# Patient Record
Sex: FEMALE | Race: WHITE | NOT HISPANIC OR LATINO | Employment: FULL TIME | URBAN - METROPOLITAN AREA
[De-identification: names, ages, dates, MRNs, and addresses within clinical notes are randomized per-mention and may not be internally consistent; named-entity substitution may affect disease eponyms.]

---

## 2017-01-04 ENCOUNTER — OFFICE VISIT (OUTPATIENT)
Dept: FAMILY MEDICINE CLINIC | Facility: CLINIC | Age: 32
End: 2017-01-04

## 2017-01-04 VITALS
DIASTOLIC BLOOD PRESSURE: 72 MMHG | OXYGEN SATURATION: 95 % | BODY MASS INDEX: 32.43 KG/M2 | HEART RATE: 71 BPM | SYSTOLIC BLOOD PRESSURE: 110 MMHG | HEIGHT: 63 IN | TEMPERATURE: 97.6 F | WEIGHT: 183 LBS

## 2017-01-04 DIAGNOSIS — Z87.42: ICD-10-CM

## 2017-01-04 DIAGNOSIS — N94.819 VULVODYNIA: ICD-10-CM

## 2017-01-04 DIAGNOSIS — N39.45 CONTINUOUS LEAKAGE OF URINE: Primary | ICD-10-CM

## 2017-01-04 LAB
BILIRUB BLD-MCNC: NEGATIVE MG/DL
CLARITY, POC: CLEAR
COLOR UR: YELLOW
GLUCOSE UR STRIP-MCNC: NEGATIVE MG/DL
KETONES UR QL: NEGATIVE
LEUKOCYTE EST, POC: NEGATIVE
NITRITE UR-MCNC: NEGATIVE MG/ML
PH UR: 5 [PH] (ref 5–8)
PROT UR STRIP-MCNC: NEGATIVE MG/DL
RBC # UR STRIP: NEGATIVE /UL
SP GR UR: 1.03 (ref 1–1.03)
UROBILINOGEN UR QL: NORMAL

## 2017-01-04 PROCEDURE — 99213 OFFICE O/P EST LOW 20 MIN: CPT | Performed by: PHYSICIAN ASSISTANT

## 2017-01-04 PROCEDURE — 81003 URINALYSIS AUTO W/O SCOPE: CPT | Performed by: PHYSICIAN ASSISTANT

## 2017-01-04 NOTE — MR AVS SNAPSHOT
Adry DYER Prophcortney   1/4/2017 4:00 PM   Office Visit    Provider:  Cora Palmer PA-C   Department:  [facility]   Dept Phone:  [phone]                Your Full Care Plan              Today's Medication Changes          These changes are accurate as of: 1/4/17  4:41 PM.  If you have any questions, ask your nurse or doctor.               Stop taking medication(s)listed here:     cefdinir 300 MG capsule   Commonly known as:  OMNICEF   Stopped by:  Cora Palmer PA-C           metroNIDAZOLE 500 MG tablet   Commonly known as:  FLAGYL   Stopped by:  Cora Palmer PA-C           oseltamivir 75 MG capsule   Commonly known as:  TAMIFLU   Stopped by:  Cora Palmer PA-C                      Your Updated Medication List          This list is accurate as of: 1/4/17  4:41 PM.  Always use your most recent med list.                buPROPion  MG 24 hr tablet   Commonly known as:  WELLBUTRIN XL   TAKE 1 TABLET BY MOUTH DAILY FOR STRESS.       FLUoxetine 20 MG capsule   Commonly known as:  PROzac   TAKE 1 CAPSULE BY MOUTH DAILY       linaclotide 290 MCG capsule capsule   Commonly known as:  LINZESS   Take 290,000 mcg by mouth Daily. For constipation       ondansetron 4 MG tablet   Commonly known as:  ZOFRAN       pantoprazole 40 MG EC tablet   Commonly known as:  PROTONIX       RELPAX 40 MG tablet   Generic drug:  eletriptan       rizatriptan 10 MG tablet   Commonly known as:  MAXALT   Take 1 tablet by mouth 1 (one) time as needed for migraine for up to 1 dose. May repeat in 2 hours if needed       tiZANidine 4 MG tablet   Commonly known as:  ZANAFLEX   TAKE 1/2 TO 1 TABLET BY MOUTH EVERY 8 HOURS AS NEEDED FOR SPASMS       topiramate 100 MG tablet   Commonly known as:  TOPAMAX   Take 1 tablet by mouth 2 (two) times a day. For migraine suppression               We Performed the Following     Ambulatory Referral to Obstetrics / Gynecology     POC Urinalysis Dipstick,  "Automated     Urinalysis With Microscopic     Urine Culture       You Were Diagnosed With        Codes Comments    Continuous leakage of urine    -  Primary ICD-10-CM: N39.45  ICD-9-CM: 788.37     H/O vaginal discharge     ICD-10-CM: Z87.42  ICD-9-CM: V13.29     Vulvodynia     ICD-10-CM: N94.819  ICD-9-CM: 625.70       Instructions    See GYN  Send urine for work up     Patient Instructions History      MyChart Signup     Our records indicate that you have an active [employer] account.    You can view your After Visit Summary by going to [employer] and logging in with your [employer] username and password.  If you don't have a [employer] username and password but a parent or guardian has access to your record, the parent or guardian should login with their own [employer] username and password and access your record to view the After Visit Summary.    If you have questions, you can email [email or web address] or call [phone] to talk to our [employer] staff.  Remember, [employer] is NOT to be used for urgent needs.  For medical emergencies, dial 911.               Other Info from Your Visit           Allergies     No Known Allergies      Reason for Visit     Illness           Vital Signs     Blood Pressure Pulse Temperature Height Weight Last Menstrual Period    110/72 (BP Location: Left arm, Patient Position: Sitting, Cuff Size: Adult) 71 97.6 °F (36.4 °C) (Oral) 63\" (160 cm) 183 lb (83 kg) 12/28/2016    Oxygen Saturation Body Mass Index Smoking Status             95% 32.42 kg/m2 Never Smoker         Problems and Diagnoses Noted     Continuous leakage of urine    -  Primary    H/O vaginal discharge        Pain in vulva          Results     POC Urinalysis Dipstick, Automated      Component Value Standard Range & Units    Color Yellow Yellow, Straw, Dark Yellow, [place]    Clarity, UA Clear Clear    Glucose, UA Negative Negative, 1000 mg/dL (3+) mg/dL    Bilirubin Negative Negative    Ketones, UA " Negative Negative    Specific Gravity  1.030 1.005 - 1.030    Blood, UA Negative Negative    pH, Urine 5.0 5.0 - 8.0    Protein, POC Negative Negative mg/dL    Urobilinogen, UA Normal Normal    Leukocytes Negative Negative    Nitrite, UA Negative Negative

## 2017-01-04 NOTE — PROGRESS NOTES
Subjective   Adry Knight is a 31 y.o. female.     History of Present Illness      Adry Knight 31 y.o. female who presents today for vaginal d/c and leaking urine  she has a history of   Patient Active Problem List   Diagnosis   • Chronic migraine without aura, with intractable migraine, so stated, without mention of status migrainosus   • Headache, migraine   • Burning or prickling sensation   • Anxiety   • Depression   • Low back pain without sciatica   • Neck pain   • Pain of left scapula   .      Still having mucus vaginal d/c--less odor since Rx Flagyl;  Leaking urine ---2 weeks  Has IUD  Sex more painful   No new abd or pelvic pain  No fever  No risk STD  Usual stress incontinence    I did wet prep and only occas WBC/HPF    The following portions of the patient's history were reviewed and updated as appropriate: allergies, current medications, past family history, past medical history, past social history, past surgical history and problem list.    Review of Systems   Constitutional: Negative for activity change, appetite change and unexpected weight change.   HENT: Negative for nosebleeds and trouble swallowing.    Eyes: Negative for pain and visual disturbance.   Respiratory: Negative for chest tightness, shortness of breath and wheezing.    Cardiovascular: Negative for chest pain and palpitations.   Gastrointestinal: Negative for abdominal pain and blood in stool.   Endocrine: Negative.    Genitourinary: Positive for dyspareunia and vaginal discharge. Negative for difficulty urinating and hematuria.   Musculoskeletal: Negative for joint swelling.   Skin: Negative for color change and rash.   Allergic/Immunologic: Negative.    Neurological: Positive for headaches. Negative for syncope and speech difficulty.   Hematological: Negative for adenopathy.   Psychiatric/Behavioral: Negative for agitation and confusion.   All other systems reviewed and are negative.      Objective   Physical Exam    Constitutional: She is oriented to person, place, and time. She appears well-developed and well-nourished. No distress.   HENT:   Head: Normocephalic and atraumatic.   Eyes: Conjunctivae and EOM are normal.   Neck: Neck supple.   Cardiovascular: Normal rate, regular rhythm, normal heart sounds and intact distal pulses.    Pulmonary/Chest: Effort normal and breath sounds normal.   Abdominal: Soft. Bowel sounds are normal. She exhibits no distension and no mass. There is no tenderness. There is no rebound and no guarding.   Genitourinary: Vagina normal and uterus normal.   Genitourinary Comments: Vaginal external is WNL; no lesions, no masses, no erythema  I see and palp Mirena string  Cervix is sore to palp  Some bladder prolapse  Mild uterine prolapse  Normal white d/c in canal  No odor     Musculoskeletal: Normal range of motion.   Neurological: She is alert and oriented to person, place, and time. She has normal reflexes.   Skin: Skin is warm and dry. No rash noted. She is not diaphoretic.   Psychiatric: She has a normal mood and affect. Her behavior is normal. Judgment and thought content normal.   Nursing note and vitals reviewed.      Assessment/Plan   Problems Addressed this Visit     None      Visit Diagnoses     Continuous leakage of urine    -  Primary    Relevant Orders    POC Urinalysis Dipstick, Automated (Completed)    Urinalysis With Microscopic    Urine Culture    Ambulatory Referral to Obstetrics / Gynecology    H/O vaginal discharge        Relevant Orders    Ambulatory Referral to Obstetrics / Gynecology    Vulvodynia

## 2017-01-06 LAB
APPEARANCE UR: CLEAR
BACTERIA #/AREA URNS HPF: ABNORMAL /[HPF]
BACTERIA UR CULT: NORMAL
BACTERIA UR CULT: NORMAL
BILIRUB UR QL STRIP: NEGATIVE
CASTS URNS MICRO: ABNORMAL
CASTS URNS QL MICRO: PRESENT /LPF
COLOR UR: YELLOW
EPI CELLS #/AREA URNS HPF: ABNORMAL /HPF
GLUCOSE UR QL: NEGATIVE
HGB UR QL STRIP: NEGATIVE
KETONES UR QL STRIP: NEGATIVE
LEUKOCYTE ESTERASE UR QL STRIP: NEGATIVE
MICRO URNS: (no result)
MICRO URNS: (no result)
MUCOUS THREADS URNS QL MICRO: PRESENT
NITRITE UR QL STRIP: NEGATIVE
PH UR STRIP: 5.5 [PH] (ref 5–7.5)
PROT UR QL STRIP: NEGATIVE
RBC #/AREA URNS HPF: ABNORMAL /HPF
SP GR UR: 1.02 (ref 1–1.03)
UROBILINOGEN UR STRIP-MCNC: 0.2 MG/DL (ref 0.2–1)
WBC #/AREA URNS HPF: ABNORMAL /HPF

## 2017-02-13 ENCOUNTER — HOSPITAL ENCOUNTER (OUTPATIENT)
Dept: CT IMAGING | Facility: HOSPITAL | Age: 32
Discharge: HOME | End: 2017-02-13
Admitting: PHYSICIAN ASSISTANT

## 2017-02-13 DIAGNOSIS — R10.9 ACUTE FLANK PAIN: ICD-10-CM

## 2017-02-13 DIAGNOSIS — R31.9 HEMATURIA: Primary | ICD-10-CM

## 2017-02-13 DIAGNOSIS — R31.9 HEMATURIA: ICD-10-CM

## 2017-02-13 PROCEDURE — 74176 CT ABD & PELVIS W/O CONTRAST: CPT

## 2017-02-14 DIAGNOSIS — K59.04 CHRONIC IDIOPATHIC CONSTIPATION: Primary | ICD-10-CM

## 2017-02-20 ENCOUNTER — OFFICE VISIT (OUTPATIENT)
Dept: GASTROENTEROLOGY | Facility: CLINIC | Age: 32
End: 2017-02-20

## 2017-02-20 VITALS
BODY MASS INDEX: 32.82 KG/M2 | WEIGHT: 185.2 LBS | DIASTOLIC BLOOD PRESSURE: 74 MMHG | HEIGHT: 63 IN | SYSTOLIC BLOOD PRESSURE: 112 MMHG

## 2017-02-20 DIAGNOSIS — R07.89 OTHER CHEST PAIN: ICD-10-CM

## 2017-02-20 DIAGNOSIS — R11.0 NAUSEA: Primary | ICD-10-CM

## 2017-02-20 DIAGNOSIS — K59.00 CONSTIPATION, UNSPECIFIED CONSTIPATION TYPE: ICD-10-CM

## 2017-02-20 DIAGNOSIS — R09.89 GLOBUS SENSATION: ICD-10-CM

## 2017-02-20 DIAGNOSIS — R19.4 BOWEL HABIT CHANGES: ICD-10-CM

## 2017-02-20 DIAGNOSIS — R10.13 EPIGASTRIC PAIN: ICD-10-CM

## 2017-02-20 DIAGNOSIS — R53.81 MALAISE AND FATIGUE: ICD-10-CM

## 2017-02-20 DIAGNOSIS — R53.83 MALAISE AND FATIGUE: ICD-10-CM

## 2017-02-20 LAB
ALBUMIN SERPL-MCNC: 4.6 G/DL (ref 3.5–5.2)
ALBUMIN/GLOB SERPL: 2.1 G/DL
ALP SERPL-CCNC: 59 U/L (ref 39–117)
ALT SERPL-CCNC: 34 U/L (ref 1–33)
AMYLASE SERPL-CCNC: 75 U/L (ref 28–100)
AST SERPL-CCNC: 20 U/L (ref 1–32)
BASOPHILS # BLD AUTO: 0.06 10*3/MM3 (ref 0–0.2)
BASOPHILS NFR BLD AUTO: 0.9 % (ref 0–1.5)
BILIRUB SERPL-MCNC: 0.3 MG/DL (ref 0.1–1.2)
BUN SERPL-MCNC: 18 MG/DL (ref 6–20)
BUN/CREAT SERPL: 22.2 (ref 7–25)
CALCIUM SERPL-MCNC: 9.2 MG/DL (ref 8.6–10.5)
CHLORIDE SERPL-SCNC: 101 MMOL/L (ref 98–107)
CO2 SERPL-SCNC: 27.2 MMOL/L (ref 22–29)
CREAT SERPL-MCNC: 0.81 MG/DL (ref 0.57–1)
EOSINOPHIL # BLD AUTO: 0.1 10*3/MM3 (ref 0–0.7)
EOSINOPHIL NFR BLD AUTO: 1.5 % (ref 0.3–6.2)
ERYTHROCYTE [DISTWIDTH] IN BLOOD BY AUTOMATED COUNT: 12.9 % (ref 11.7–13)
GLOBULIN SER CALC-MCNC: 2.2 GM/DL
GLUCOSE SERPL-MCNC: 80 MG/DL (ref 65–99)
HCT VFR BLD AUTO: 44.1 % (ref 35.6–45.5)
HGB BLD-MCNC: 14.4 G/DL (ref 11.9–15.5)
IMM GRANULOCYTES # BLD: 0.02 10*3/MM3 (ref 0–0.03)
IMM GRANULOCYTES NFR BLD: 0.3 % (ref 0–0.5)
LIPASE SERPL-CCNC: 29 U/L (ref 13–60)
LYMPHOCYTES # BLD AUTO: 2.17 10*3/MM3 (ref 0.9–4.8)
LYMPHOCYTES NFR BLD AUTO: 33.2 % (ref 19.6–45.3)
MCH RBC QN AUTO: 30.3 PG (ref 26.9–32)
MCHC RBC AUTO-ENTMCNC: 32.7 G/DL (ref 32.4–36.3)
MCV RBC AUTO: 92.8 FL (ref 80.5–98.2)
MONOCYTES # BLD AUTO: 0.39 10*3/MM3 (ref 0.2–1.2)
MONOCYTES NFR BLD AUTO: 6 % (ref 5–12)
NEUTROPHILS # BLD AUTO: 3.79 10*3/MM3 (ref 1.9–8.1)
NEUTROPHILS NFR BLD AUTO: 58.1 % (ref 42.7–76)
PLATELET # BLD AUTO: 239 10*3/MM3 (ref 140–500)
POTASSIUM SERPL-SCNC: 4.4 MMOL/L (ref 3.5–5.2)
PROT SERPL-MCNC: 6.8 G/DL (ref 6–8.5)
RBC # BLD AUTO: 4.75 10*6/MM3 (ref 3.9–5.2)
SODIUM SERPL-SCNC: 140 MMOL/L (ref 136–145)
TSH SERPL DL<=0.005 MIU/L-ACNC: 1.37 MIU/ML (ref 0.27–4.2)
WBC # BLD AUTO: 6.53 10*3/MM3 (ref 4.5–10.7)

## 2017-02-20 PROCEDURE — 99214 OFFICE O/P EST MOD 30 MIN: CPT | Performed by: NURSE PRACTITIONER

## 2017-02-20 RX ORDER — IBUPROFEN 200 MG
200 TABLET ORAL EVERY 6 HOURS PRN
COMMUNITY
End: 2019-06-28

## 2017-02-20 RX ORDER — PANTOPRAZOLE SODIUM 40 MG/1
40 TABLET, DELAYED RELEASE ORAL DAILY
Qty: 30 TABLET | Refills: 5 | Status: SHIPPED | OUTPATIENT
Start: 2017-02-20 | End: 2018-07-10 | Stop reason: SDUPTHER

## 2017-02-20 NOTE — PROGRESS NOTES
Sravan Knight is a 31 y.o. female, patient of Dr. Ahmadi.  Here today for recurrent abdominal pain and constipation.  New onset of nausea associated with constipation.  In 9/2015, Linzess 290 MCG's was started.  Initially, constipation was relieved.  Several weeks ago, she had no BM for 2 weeks.  Her PCP instructed her to take 225 g of MiraLAX mixed with Gatorade. BM did occur but she felt like it was not a complete evacuation.  She stopped Linzess 1 week ago and now takes MiraLAX every day.  Continues to experience constipation.  One incident of blood on toilet paper with straining.  Also complaining of globus sensation, and early satiety.  Denies dysphagia, odynophagia, vomiting and bloody/black stools.  Was seen by PCP earlier this month due to flank pain.  CT abdomen/pelvis on 2/13/17 was negative.  Has never had a colonoscopy.  Last EGD, 9/2015, was normal.  No history of colon cancer in family.    Abdominal Pain   This is a recurrent problem. The current episode started 1 to 4 weeks ago. The onset quality is undetermined. The problem occurs constantly. The most recent episode lasted 2 weeks. The problem has been waxing and waning. The pain is located in the generalized abdominal region. The pain is at a severity of 6/10. The quality of the pain is cramping. The abdominal pain radiates to the back. Associated symptoms include constipation, frequency, headaches (history of migraine headaches), hematuria, myalgias and nausea. Pertinent negatives include no anorexia, arthralgias, diarrhea, fever or vomiting. The pain is aggravated by certain positions and eating. The pain is relieved by nothing. Treatments tried: linzess. The treatment provided no relief. Prior diagnostic workup includes CT scan. Her past medical history is significant for irritable bowel syndrome. There is no history of abdominal surgery, Crohn's disease, PUD or ulcerative colitis.   Constipation   This is a recurrent  problem. The current episode started more than 1 year ago. The problem has been gradually improving since onset. The stool is described as formed and pencil thin. The patient is not on a high fiber diet. She does not exercise regularly. There has not been adequate water intake. Associated symptoms include abdominal pain and nausea. Pertinent negatives include no anorexia, diarrhea, fever, rectal pain or vomiting. She has tried stool softeners (also uses Miralax) for the symptoms. The treatment provided mild relief. Her past medical history is significant for irritable bowel syndrome. There is no history of abdominal surgery or inflammatory bowel disease.   Nausea   This is a new problem. The current episode started 1 to 4 weeks ago. Associated symptoms include abdominal pain, a change in bowel habit, chest pain, fatigue, headaches (history of migraine headaches), myalgias, nausea and a sore throat. Pertinent negatives include no anorexia, arthralgias, chills, coughing, fever, rash, swollen glands, vomiting or weakness. Nothing aggravates the symptoms. She has tried nothing for the symptoms.        Vitals:    02/20/17 1019   BP: 112/74         Current Outpatient Prescriptions:   •  buPROPion XL (WELLBUTRIN XL) 300 MG 24 hr tablet, TAKE 1 TABLET BY MOUTH DAILY FOR STRESS., Disp: 30 tablet, Rfl: 5  •  ibuprofen (ADVIL,MOTRIN) 200 MG tablet, Take 200 mg by mouth Every 6 (Six) Hours As Needed for mild pain (1-3)., Disp: , Rfl:   •  rizatriptan (MAXALT) 10 MG tablet, Take 1 tablet by mouth 1 (one) time as needed for migraine for up to 1 dose. May repeat in 2 hours if needed, Disp: 12 tablet, Rfl: 5  •  tiZANidine (ZANAFLEX) 4 MG tablet, TAKE 1/2 TO 1 TABLET BY MOUTH EVERY 8 HOURS AS NEEDED FOR SPASMS, Disp: 90 tablet, Rfl: 0  •  FLUoxetine (PROzac) 20 MG capsule, TAKE 1 CAPSULE BY MOUTH DAILY, Disp: 30 capsule, Rfl: 0  •  pantoprazole (PROTONIX) 40 MG EC tablet, Take 1 tablet by mouth Daily., Disp: 30 tablet, Rfl: 5    No  Known Allergies    Past Medical History   Diagnosis Date   • Anxiety    • Depression    • Dysphagia    • Headache    • Hernia    • Irritable bowel syndrome    • Lactose intolerance    • LFT elevation        Past Surgical History   Procedure Laterality Date   • Tonsillectomy     • Endoscopy  09/21/2015     Normal    • Upper gastrointestinal endoscopy  09/21/2015     normal       Family History   Problem Relation Age of Onset   • Depression Mother    • Irritable bowel syndrome Mother    • Cancer Sister    • Cancer Paternal Grandmother    • Celiac disease Sister    • Irritable bowel syndrome Maternal Grandmother        Social History     Social History   • Marital status:      Spouse name: N/A   • Number of children: N/A   • Years of education: N/A     Social History Main Topics   • Smoking status: Never Smoker   • Smokeless tobacco: None   • Alcohol use No   • Drug use: No   • Sexual activity: Yes     Partners: Male     Birth control/ protection: IUD     Other Topics Concern   • None     Social History Narrative       The following portions of the patient's history were reviewed and updated as appropriate: allergies, current medications, past family history, past medical history, past social history, past surgical history and problem list.    Review of Systems   Constitutional: Positive for appetite change and fatigue. Negative for activity change, chills and fever.   HENT: Positive for sore throat. Negative for trouble swallowing.    Respiratory: Positive for shortness of breath (states chest pain is associated with shortness of breath, denies diaphoresis.). Negative for cough.    Cardiovascular: Positive for chest pain. Negative for leg swelling.   Gastrointestinal: Positive for abdominal distention, abdominal pain, change in bowel habit, constipation and nausea. Negative for anal bleeding, anorexia, blood in stool, diarrhea, rectal pain and vomiting.   Genitourinary: Positive for frequency and hematuria.      "   Earlier this month was seen by GYN for urinary incontinence.  Medication was given but patient is not taking it due to side effect of constipation.   Musculoskeletal: Positive for myalgias. Negative for arthralgias.   Skin: Negative for color change, pallor and rash.   Neurological: Positive for headaches (history of migraine headaches). Negative for dizziness, syncope, weakness and light-headedness.       Objective     Physical Exam   Constitutional: She is oriented to person, place, and time. She appears well-developed and well-nourished. No distress.   HENT:   Head: Normocephalic and atraumatic.   Complaining of \"bump\" in right oral cavity.  Small soft, pink nodule palpated on right side of the oral cavity, tender to palpation.   Eyes: Conjunctivae are normal. No scleral icterus.   Neck: Normal range of motion. Neck supple.   Cardiovascular: Normal rate, regular rhythm, normal heart sounds and intact distal pulses.    Pulmonary/Chest: Effort normal and breath sounds normal. No respiratory distress.   Abdominal: Soft. Bowel sounds are normal. She exhibits no distension and no mass. There is tenderness ( pain in epigastric area with palpation). There is no rebound and no guarding.   Musculoskeletal: She exhibits no deformity.   Lymphadenopathy:     She has no cervical adenopathy.   Neurological: She is alert and oriented to person, place, and time.   Skin: Skin is warm and dry. No rash noted. No pallor.   Psychiatric: She has a normal mood and affect. Her behavior is normal. Judgment and thought content normal.   Nursing note and vitals reviewed.      Assessment/Plan     Adry was seen today for abdominal pain, constipation and nausea.    Diagnoses and all orders for this visit:    Nausea  -     CBC & Differential  -     Comprehensive Metabolic Panel  -     Lipase  -     Amylase  -     TSH  -     Case request    Globus sensation  -     CBC & Differential  -     Comprehensive Metabolic Panel  -     Lipase  -    "  Amylase  -     TSH  -     Case request    Bowel habit changes  -     CBC & Differential  -     Comprehensive Metabolic Panel  -     Lipase  -     Amylase  -     TSH  -     Case request    Constipation, unspecified constipation type  -     CBC & Differential  -     Comprehensive Metabolic Panel  -     Lipase  -     Amylase  -     TSH  -     Case request    Epigastric pain  -     CBC & Differential  -     Comprehensive Metabolic Panel  -     Lipase  -     Amylase  -     TSH  -     Case request    Malaise and fatigue  -     CBC & Differential  -     Comprehensive Metabolic Panel  -     Lipase  -     Amylase  -     TSH  -     Case request    Other chest pain    Other orders  -     pantoprazole (PROTONIX) 40 MG EC tablet; Take 1 tablet by mouth Daily.      Assessment/plan  1.  Nausea/globus sensation/epigastric pain - EGD with Dr. Alas.  Restart Protonix 40 mg daily.  Check above labs and call patient with results.  2.  Bowel habit changes/constipation - continue with MiraLAX.  Add stool softener.  Increase daily water intake and dietary fiber.  Schedule colonoscopy with Dr. Alas.    3.  Fatigue - check TSH and call patient with results.   4.  Chest pain  - advised patient to discuss with PCP as soon as possible.      Discussed plan of care with patient. Verbalizes understanding and agrees with plan.  Advised to call office for any concerns or questions regarding today's visit and if any GI problems should arise.

## 2017-02-22 ENCOUNTER — OFFICE VISIT (OUTPATIENT)
Dept: FAMILY MEDICINE CLINIC | Facility: CLINIC | Age: 32
End: 2017-02-22

## 2017-02-22 VITALS
TEMPERATURE: 98.2 F | BODY MASS INDEX: 32.78 KG/M2 | HEART RATE: 66 BPM | HEIGHT: 63 IN | SYSTOLIC BLOOD PRESSURE: 110 MMHG | WEIGHT: 185 LBS | RESPIRATION RATE: 16 BRPM | DIASTOLIC BLOOD PRESSURE: 72 MMHG | OXYGEN SATURATION: 96 %

## 2017-02-22 DIAGNOSIS — G43.709 CHRONIC MIGRAINE WITHOUT AURA WITHOUT STATUS MIGRAINOSUS, NOT INTRACTABLE: ICD-10-CM

## 2017-02-22 DIAGNOSIS — J02.8 PHARYNGITIS DUE TO OTHER ORGANISM: Primary | ICD-10-CM

## 2017-02-22 DIAGNOSIS — R31.9 HEMATURIA: ICD-10-CM

## 2017-02-22 PROCEDURE — 99213 OFFICE O/P EST LOW 20 MIN: CPT | Performed by: PHYSICIAN ASSISTANT

## 2017-02-22 RX ORDER — TOPIRAMATE 25 MG/1
TABLET ORAL
Qty: 120 TABLET | Refills: 1 | Status: SHIPPED | OUTPATIENT
Start: 2017-02-22 | End: 2017-08-24

## 2017-02-22 NOTE — PATIENT INSTRUCTIONS
Dose this one at bedtime for a week then if get a migraine anytime after that then go up to one pill twice daily for a week then after this if migraine go up to one in AM and 2 in PM for one week, after this if migraine increase to max dose of 2 in AM and 2 in PM (100mg) daily.  Also warned patient about risk renal stones, numb/tingling hands and feet, and can interfere with concentration.    For throat pain:  Can gargle with 1/2 Maalox and 1/2 Benadryl liquid ---mix, gargle and spit Take Tylenol for fever or aches  Rest as needed  Call not better in 7 days  Increase fluids  Call if worse  Can use generic Afrin nasal spray up to 5 days for nasal congestion  Finish antibiotic course of therapy

## 2017-02-22 NOTE — PROGRESS NOTES
Subjective   Adry Knight is a 31 y.o. female.     History of Present Illness   Adry Knight 31 y.o. female who presents for evaluation of oral sore right side pharynx. Symptoms include sore to swallow and touch it.  Onset of symptoms was 3 days ago, gradually improving since that time. Patient denies fever, no new congestion.   Evaluation to date: none Treatment to date:  none.     She needs to restart Topamax for migraine suppression and will get her back to 100mg once daily    The following portions of the patient's history were reviewed and updated as appropriate: allergies, current medications, past family history, past medical history, past social history, past surgical history and problem list.    Review of Systems   Constitutional: Negative for activity change, appetite change and unexpected weight change.   HENT: Positive for mouth sores and trouble swallowing. Negative for nosebleeds.    Eyes: Negative for pain and visual disturbance.   Respiratory: Negative for shortness of breath and wheezing.    Cardiovascular: Negative for palpitations.   Gastrointestinal: Positive for abdominal pain, constipation and nausea. Negative for blood in stool. Abdominal distention: gerd.   Endocrine: Negative.    Genitourinary: Negative for difficulty urinating and hematuria.   Musculoskeletal: Positive for back pain. Negative for joint swelling.   Skin: Negative for color change and rash.   Allergic/Immunologic: Positive for environmental allergies and food allergies.   Neurological: Positive for dizziness and headaches. Negative for syncope and speech difficulty.   Hematological: Negative for adenopathy.   Psychiatric/Behavioral: Negative for agitation and confusion.   All other systems reviewed and are negative.      Objective   Physical Exam   Constitutional: She is oriented to person, place, and time. She appears well-developed and well-nourished. No distress.   HENT:   Head: Normocephalic and atraumatic.    Right Ear: External ear normal.   Left Ear: External ear normal.   Nose: Nose normal.   Mouth/Throat: Oropharynx is clear and moist.   No tonsils  Cobblestoning from drainage  Right side pharynx is 2 X 3.5mm ulceration healing and no longer pustular;  Local erythema in center     Eyes: Conjunctivae and EOM are normal. Pupils are equal, round, and reactive to light. Right eye exhibits no discharge. Left eye exhibits no discharge. No scleral icterus.   Neck: Normal range of motion. Neck supple. No tracheal deviation present. No thyromegaly present.   Cardiovascular: Normal rate, regular rhythm, normal heart sounds, intact distal pulses and normal pulses.  Exam reveals no gallop.    No murmur heard.  Pulmonary/Chest: Effort normal and breath sounds normal. No respiratory distress. She has no wheezes. She has no rales.   Musculoskeletal: Normal range of motion.   Neurological: She is alert and oriented to person, place, and time. She exhibits normal muscle tone. Coordination normal.   Skin: Skin is warm. No rash noted. No erythema. No pallor.   Psychiatric: She has a normal mood and affect. Her behavior is normal. Judgment and thought content normal.   Nursing note and vitals reviewed.      Assessment/Plan   Problems Addressed this Visit        Cardiovascular and Mediastinum    Headache, migraine    Relevant Medications    topiramate (TOPAMAX) 25 MG tablet      Other Visit Diagnoses     Pharyngitis due to other organism    -  Primary    Hematuria        Relevant Orders    Ambulatory Referral to Urology (Completed)

## 2017-03-19 RX ORDER — TIZANIDINE 4 MG/1
TABLET ORAL
Qty: 90 TABLET | Refills: 3 | Status: SHIPPED | OUTPATIENT
Start: 2017-03-19 | End: 2018-10-23

## 2017-07-14 ENCOUNTER — OFFICE VISIT (OUTPATIENT)
Dept: RETAIL CLINIC | Facility: CLINIC | Age: 32
End: 2017-07-14

## 2017-07-14 VITALS
SYSTOLIC BLOOD PRESSURE: 120 MMHG | RESPIRATION RATE: 18 BRPM | HEART RATE: 77 BPM | DIASTOLIC BLOOD PRESSURE: 78 MMHG | OXYGEN SATURATION: 100 % | TEMPERATURE: 98.9 F

## 2017-07-14 DIAGNOSIS — B02.9 HERPES ZOSTER WITHOUT COMPLICATION: Primary | ICD-10-CM

## 2017-07-14 PROCEDURE — 99213 OFFICE O/P EST LOW 20 MIN: CPT | Performed by: NURSE PRACTITIONER

## 2017-07-14 RX ORDER — ACYCLOVIR 400 MG/1
800 TABLET ORAL
Qty: 70 TABLET | Refills: 0 | Status: SHIPPED | OUTPATIENT
Start: 2017-07-14 | End: 2017-07-24

## 2017-07-14 NOTE — PROGRESS NOTES
Subjective   Adry Knight is a 31 y.o. female.     Rash   This is a new problem. Episode onset: 2 days ago. The problem has been gradually worsening since onset. The affected locations include the torso. The rash is characterized by blistering, itchiness, redness and pain. She was exposed to nothing. Pertinent negatives include no congestion, cough, facial edema, fatigue, fever, rhinorrhea, shortness of breath or sore throat. Past treatments include topical steroids. The treatment provided no relief. Her past medical history is significant for varicella (childhood). There is no history of eczema.       The following portions of the patient's history were reviewed and updated as appropriate: allergies, current medications, past family history, past medical history, past social history, past surgical history and problem list.    Review of Systems   Constitutional: Negative for chills, diaphoresis, fatigue and fever.   HENT: Negative for congestion, hearing loss, mouth sores, rhinorrhea and sore throat.    Eyes: Negative for visual disturbance.   Respiratory: Negative for cough and shortness of breath.    Musculoskeletal: Negative for myalgias.   Skin: Positive for color change and rash. Negative for wound.   Allergic/Immunologic: Negative for immunocompromised state.   Neurological: Negative for weakness and numbness.       Objective   Physical Exam   Constitutional: She is oriented to person, place, and time. She appears well-developed and well-nourished. She is cooperative. She does not appear ill. No distress.   Eyes: Conjunctivae are normal.   Neurological: She is alert and oriented to person, place, and time.   Skin: Rash noted. Rash is maculopapular and vesicular. She is not diaphoretic. There is erythema.        4 lesions following dermatone   Vitals reviewed.      Assessment/Plan   Adry was seen today for rash.    Diagnoses and all orders for this visit:    Herpes zoster without complication  -      acyclovir (ZOVIRAX) 400 MG tablet; Take 2 tablets by mouth 5 (Five) Times a Day for 10 days. Take no more than 5 doses a day.          -     F/U with PCP for persistent sx, follow up with ER if you have changes in vision or hearing, or if any current sx worsen you should follow up with UC/ER

## 2017-07-14 NOTE — PATIENT INSTRUCTIONS
Shingles  Shingles, which is also known as herpes zoster, is an infection that causes a painful skin rash and fluid-filled blisters. Shingles is not related to genital herpes, which is a sexually transmitted infection.   Shingles only develops in people who:  · Have had chickenpox.  · Have received the chickenpox vaccine. (This is rare.)  CAUSES  Shingles is caused by varicella-zoster virus (VZV). This is the same virus that causes chickenpox. After exposure to VZV, the virus stays in the body in an inactive (dormant) state. Shingles develops if the virus reactivates. This can happen many years after the initial exposure to VZV. It is not known what causes this virus to reactivate.  RISK FACTORS  People who have had chickenpox or received the chickenpox vaccine are at risk for shingles. Infection is more common in people who:  · Are older than age 50.  · Have a weakened defense (immune) system, such as those with HIV, AIDS, or cancer.  · Are taking medicines that weaken the immune system, such as transplant medicines.  · Are under great stress.  SYMPTOMS  Early symptoms of this condition include itching, tingling, and pain in an area on your skin. Pain may be described as burning, stabbing, or throbbing.  A few days or weeks after symptoms start, a painful red rash appears, usually on one side of the body in a bandlike or beltlike pattern. The rash eventually turns into fluid-filled blisters that break open, scab over, and dry up in about 2-3 weeks.  At any time during the infection, you may also develop:  · A fever.  · Chills.  · A headache.  · An upset stomach.  DIAGNOSIS  This condition is diagnosed with a skin exam. Sometimes, skin or fluid samples are taken from the blisters before a diagnosis is made. These samples are examined under a microscope or sent to a lab for testing.  TREATMENT  There is no specific cure for this condition. Your health care provider will probably prescribe medicines to help you manage  pain, recover more quickly, and avoid long-term problems. Medicines may include:  · Antiviral drugs.  · Anti-inflammatory drugs.  · Pain medicines.  If the area involved is on your face, you may be referred to a specialist, such as an eye doctor (ophthalmologist) or an ear, nose, and throat (ENT) doctor to help you avoid eye problems, chronic pain, or disability.  HOME CARE INSTRUCTIONS  Medicines  · Take medicines only as directed by your health care provider.  · Apply an anti-itch or numbing cream to the affected area as directed by your health care provider.  Blister and Rash Care  · Take a cool bath or apply cool compresses to the area of the rash or blisters as directed by your health care provider. This may help with pain and itching.  · Keep your rash covered with a loose bandage (dressing). Wear loose-fitting clothing to help ease the pain of material rubbing against the rash.  · Keep your rash and blisters clean with mild soap and cool water or as directed by your health care provider.  · Check your rash every day for signs of infection. These include redness, swelling, and pain that lasts or increases.  · Do not pick your blisters.  · Do not scratch your rash.  General Instructions  · Rest as directed by your health care provider.  · Keep all follow-up visits as directed by your health care provider. This is important.  · Until your blisters scab over, your infection can cause chickenpox in people who have never had it or been vaccinated against it. To prevent this from happening, avoid contact with other people, especially:    Babies.    Pregnant women.    Children who have eczema.    Elderly people who have transplants.    People who have chronic illnesses, such as leukemia or AIDS.  SEEK MEDICAL CARE IF:  · Your pain is not relieved with prescribed medicines.  · Your pain does not get better after the rash heals.  · Your rash looks infected. Signs of infection include redness, swelling, and pain that  lasts or increases.  SEEK IMMEDIATE MEDICAL CARE IF:  · The rash is on your face or nose.  · You have facial pain, pain around your eye area, or loss of feeling on one side of your face.  · You have ear pain or you have ringing in your ear.  · You have loss of taste.  · Your condition gets worse.     This information is not intended to replace advice given to you by your health care provider. Make sure you discuss any questions you have with your health care provider.     Document Released: 12/18/2006 Document Revised: 04/10/2017 Document Reviewed: 10/29/2015  [employer] Interactive Patient Education ©2017 [employer]

## 2017-08-06 RX ORDER — TOPIRAMATE 100 MG/1
TABLET, FILM COATED ORAL
Qty: 60 TABLET | Refills: 5 | Status: SHIPPED | OUTPATIENT
Start: 2017-08-06 | End: 2017-11-27 | Stop reason: SDUPTHER

## 2017-08-18 RX ORDER — RIZATRIPTAN BENZOATE 10 MG/1
TABLET ORAL
Qty: 12 TABLET | Refills: 3 | Status: SHIPPED | OUTPATIENT
Start: 2017-08-18 | End: 2018-09-30 | Stop reason: SDUPTHER

## 2017-08-24 ENCOUNTER — OFFICE VISIT (OUTPATIENT)
Dept: FAMILY MEDICINE CLINIC | Facility: CLINIC | Age: 32
End: 2017-08-24

## 2017-08-24 VITALS
RESPIRATION RATE: 16 BRPM | HEIGHT: 63 IN | WEIGHT: 172 LBS | TEMPERATURE: 98.6 F | HEART RATE: 96 BPM | BODY MASS INDEX: 30.48 KG/M2 | OXYGEN SATURATION: 97 % | DIASTOLIC BLOOD PRESSURE: 78 MMHG | SYSTOLIC BLOOD PRESSURE: 118 MMHG

## 2017-08-24 DIAGNOSIS — J06.9 ACUTE URI: Primary | ICD-10-CM

## 2017-08-24 PROCEDURE — 99213 OFFICE O/P EST LOW 20 MIN: CPT | Performed by: NURSE PRACTITIONER

## 2017-08-24 RX ORDER — CEFUROXIME AXETIL 500 MG/1
500 TABLET ORAL 2 TIMES DAILY
Qty: 20 TABLET | Refills: 0 | Status: SHIPPED | OUTPATIENT
Start: 2017-08-24 | End: 2017-09-03

## 2017-08-24 RX ORDER — METHYLPREDNISOLONE 4 MG/1
TABLET ORAL
Qty: 21 TABLET | Refills: 0 | Status: SHIPPED | OUTPATIENT
Start: 2017-08-24 | End: 2017-11-27

## 2017-08-24 NOTE — PROGRESS NOTES
Subjective   Adry Knight is a 31 y.o. female.     History of Present Illness   Adry Knight 31 y.o. female who presents for evaluation of upper respiratory congestion. Symptoms include ear pressure, congestion, nasal blockage, productive cough, headache and runny nose.  Onset of symptoms was 4 days ago, unchanged since that time. Patient denies fever.   Evaluation to date: none Treatment to date:  OTC oral decongestants, OTC cough suppressant and Tylenol.     The following portions of the patient's history were reviewed and updated as appropriate: allergies, current medications, past family history, past medical history, past social history, past surgical history and problem list.    Review of Systems   Constitutional: Negative for chills and fever.   HENT: Positive for congestion, ear pain, postnasal drip and sinus pressure. Negative for ear discharge and sore throat.    Respiratory: Positive for cough and chest tightness.        Objective   Physical Exam   Constitutional: She is oriented to person, place, and time. She appears well-developed and well-nourished.   HENT:   Right Ear: Tympanic membrane, external ear and ear canal normal.   Left Ear: Tympanic membrane, external ear and ear canal normal.   Nose: Right sinus exhibits no maxillary sinus tenderness and no frontal sinus tenderness. Left sinus exhibits no maxillary sinus tenderness and no frontal sinus tenderness.   Mouth/Throat: Uvula is midline and oropharynx is clear and moist.   Cardiovascular: Normal rate and regular rhythm.    Pulmonary/Chest: Effort normal and breath sounds normal.   Neurological: She is alert and oriented to person, place, and time.   Skin: Skin is warm.   Psychiatric: She has a normal mood and affect. Judgment normal.   Nursing note and vitals reviewed.      Assessment/Plan   Adry was seen today for cough and nasal congestion.    Diagnoses and all orders for this visit:    Acute URI  -     MethylPREDNISolone  (MEDROL, NOELLE,) 4 MG tablet; Take as directed on package instructions.  -     cefuroxime (CEFTIN) 500 MG tablet; Take 1 tablet by mouth 2 (Two) Times a Day for 10 days.      Patient to fill antibiotic if symptoms worsen or do not improve within the next 4-5 days.

## 2017-09-27 DIAGNOSIS — F41.9 ANXIETY: ICD-10-CM

## 2017-09-27 RX ORDER — BUPROPION HYDROCHLORIDE 300 MG/1
TABLET ORAL
Qty: 30 TABLET | Refills: 0 | Status: SHIPPED | OUTPATIENT
Start: 2017-09-27 | End: 2017-11-13 | Stop reason: SDUPTHER

## 2017-11-13 DIAGNOSIS — F41.9 ANXIETY: ICD-10-CM

## 2017-11-13 RX ORDER — LINACLOTIDE 290 UG/1
CAPSULE, GELATIN COATED ORAL
Qty: 30 CAPSULE | Refills: 0 | Status: SHIPPED | OUTPATIENT
Start: 2017-11-13 | End: 2018-02-10 | Stop reason: SDUPTHER

## 2017-11-13 RX ORDER — BUPROPION HYDROCHLORIDE 300 MG/1
TABLET ORAL
Qty: 30 TABLET | Refills: 0 | Status: SHIPPED | OUTPATIENT
Start: 2017-11-13 | End: 2017-11-27 | Stop reason: SDUPTHER

## 2017-11-27 ENCOUNTER — OFFICE VISIT (OUTPATIENT)
Dept: FAMILY MEDICINE CLINIC | Facility: CLINIC | Age: 32
End: 2017-11-27

## 2017-11-27 VITALS
OXYGEN SATURATION: 98 % | SYSTOLIC BLOOD PRESSURE: 110 MMHG | HEIGHT: 63 IN | HEART RATE: 77 BPM | TEMPERATURE: 98.3 F | DIASTOLIC BLOOD PRESSURE: 78 MMHG | WEIGHT: 180 LBS | BODY MASS INDEX: 31.89 KG/M2 | RESPIRATION RATE: 16 BRPM

## 2017-11-27 DIAGNOSIS — Z00.00 LABORATORY EXAMINATION ORDERED AS PART OF A ROUTINE GENERAL MEDICAL EXAMINATION: ICD-10-CM

## 2017-11-27 DIAGNOSIS — R53.83 FATIGUE, UNSPECIFIED TYPE: ICD-10-CM

## 2017-11-27 DIAGNOSIS — G43.119 INTRACTABLE MIGRAINE WITH AURA WITHOUT STATUS MIGRAINOSUS: Primary | ICD-10-CM

## 2017-11-27 DIAGNOSIS — F41.9 ANXIETY: ICD-10-CM

## 2017-11-27 PROCEDURE — 99214 OFFICE O/P EST MOD 30 MIN: CPT | Performed by: PHYSICIAN ASSISTANT

## 2017-11-27 RX ORDER — TOPIRAMATE 100 MG/1
100 TABLET, FILM COATED ORAL 2 TIMES DAILY
Qty: 60 TABLET | Refills: 5 | Status: SHIPPED | OUTPATIENT
Start: 2017-11-27 | End: 2018-09-30

## 2017-11-27 RX ORDER — BUPROPION HYDROCHLORIDE 300 MG/1
300 TABLET ORAL EVERY MORNING
Qty: 30 TABLET | Refills: 11 | Status: SHIPPED | OUTPATIENT
Start: 2017-11-27 | End: 2019-01-09 | Stop reason: SDUPTHER

## 2017-11-27 RX ORDER — DULOXETIN HYDROCHLORIDE 30 MG/1
CAPSULE, DELAYED RELEASE ORAL
Qty: 30 CAPSULE | Refills: 2 | Status: SHIPPED | OUTPATIENT
Start: 2017-11-27 | End: 2018-08-13 | Stop reason: SDUPTHER

## 2017-11-27 NOTE — PROGRESS NOTES
Subjective   Adry Knight is a 32 y.o. female.     History of Present Illness   Adry Knight 32 y.o. female who presents today for routine follow up check and medication refills.  she has a history of   Patient Active Problem List   Diagnosis   • Intractable migraine with aura without status migrainosus   • Headache, migraine   • Burning or prickling sensation   • Anxiety   • Depression   • Low back pain without sciatica   • Neck pain   • Pain of left scapula   .  Since the last visit, she has overall felt tired.  She has staying tired and has for months.  Getting worse.  .  she has been compliant with current medications have reviewed them.  The patient denies medication side effects.  NO hair loss, no heavy periods and has IUD; no snoring    Results for orders placed or performed in visit on 02/20/17   Comprehensive Metabolic Panel   Result Value Ref Range    Glucose 80 65 - 99 mg/dL    BUN 18 6 - 20 mg/dL    Creatinine 0.81 0.57 - 1.00 mg/dL    eGFR Non African Am 84 >60 mL/min/1.73    eGFR African Am 101 >60 mL/min/1.73    BUN/Creatinine Ratio 22.2 7.0 - 25.0    Sodium 140 136 - 145 mmol/L    Potassium 4.4 3.5 - 5.2 mmol/L    Chloride 101 98 - 107 mmol/L    Total CO2 27.2 22.0 - 29.0 mmol/L    Calcium 9.2 8.6 - 10.5 mg/dL    Total Protein 6.8 6.0 - 8.5 g/dL    Albumin 4.60 3.50 - 5.20 g/dL    Globulin 2.2 gm/dL    A/G Ratio 2.1 g/dL    Total Bilirubin 0.3 0.1 - 1.2 mg/dL    Alkaline Phosphatase 59 39 - 117 U/L    AST (SGOT) 20 1 - 32 U/L    ALT (SGPT) 34 (H) 1 - 33 U/L   Lipase   Result Value Ref Range    Lipase 29 13 - 60 U/L   Amylase   Result Value Ref Range    Amylase 75 28 - 100 U/L   TSH   Result Value Ref Range    TSH 1.370 0.270 - 4.200 mIU/mL   CBC & Differential   Result Value Ref Range    WBC 6.53 4.50 - 10.70 10*3/mm3    RBC 4.75 3.90 - 5.20 10*6/mm3    Hemoglobin 14.4 11.9 - 15.5 g/dL    Hematocrit 44.1 35.6 - 45.5 %    MCV 92.8 80.5 - 98.2 fL    MCH 30.3 26.9 - 32.0 pg    MCHC 32.7  32.4 - 36.3 g/dL    RDW 12.9 11.7 - 13.0 %    Platelets 239 140 - 500 10*3/mm3    Neutrophil Rel % 58.1 42.7 - 76.0 %    Lymphocyte Rel % 33.2 19.6 - 45.3 %    Monocyte Rel % 6.0 5.0 - 12.0 %    Eosinophil Rel % 1.5 0.3 - 6.2 %    Basophil Rel % 0.9 0.0 - 1.5 %    Neutrophils Absolute 3.79 1.90 - 8.10 10*3/mm3    Lymphocytes Absolute 2.17 0.90 - 4.80 10*3/mm3    Monocytes Absolute 0.39 0.20 - 1.20 10*3/mm3    Eosinophils Absolute 0.10 0.00 - 0.70 10*3/mm3    Basophils Absolute 0.06 0.00 - 0.20 10*3/mm3    Immature Granulocyte Rel % 0.3 0.0 - 0.5 %    Immature Grans Absolute 0.02 0.00 - 0.03 10*3/mm3     She is on Topamax for migraine suppression and only tolerates 100mg daily d/t concentration.    Adry Knight 32 y.o. female presents for evaluation of migraine. Symptoms began about several years ago. Generally, the headaches last about constant anymore hours and occur daily. The headaches are usually pounding, squeezing and throbbing. The location of the headache pain is bilateral, occipital, temporal, parietal and retro-orbital. Recently, the headaches have been increasing in both severity and frequency. Work attendance or other daily activities are affected by the headaches. Precipitating factors include: none which have been determined. The headaches are usually preceded by an aura consisting of visual scintillations. Associated symptoms include nausea, photophobia and phonophobia. The patient denies muscle weakness and numbness of extremities. Home treatment has included ibuprofen and Topamax and Maxalt with some improvement. Other history includes: Migraine headache. . Family history includes headaches of unknown type in mother and sister.  Prior therapies tried include OTC analgesics and Topamax with relief some help, but still symptoms.      Adry Knight female 32 y.o. who presents today for follow up of Depression and Anxiety.  She reports fatigue, anxiety, impaired concentration, excessive  worry and unable to relax. Onset of symptoms was approximately several years ago.  She denies current suicidal and homicidal ideation. Risk factors are family history of anxiety and or depression and lifestyle of multiple roles.  Previous treatment includes Effexor XR and gained weight;  Zoloft, Celexa and had weight gain.  She complains of the following medication side effects: weight gain.  The patient has previously been in counseling..  She is on Wellbutrin and helps depression  I did try to Rx Cymbalta a few years ago but cost was issue    Plan to try Cymbalta; get labs; if stays tired and normal labs, will refer sleep study  Needs brain MRI and neuro    Sees GI doc     The following portions of the patient's history were reviewed and updated as appropriate: allergies, current medications, past family history, past medical history, past social history, past surgical history and problem list.    Review of Systems   Constitutional: Positive for activity change, fatigue and unexpected weight change. Negative for appetite change.   HENT: Positive for sinus pressure and trouble swallowing. Negative for nosebleeds.    Eyes: Negative for pain and visual disturbance.   Respiratory: Negative for chest tightness, shortness of breath and wheezing.    Cardiovascular: Negative for chest pain and palpitations.   Gastrointestinal: Positive for constipation. Negative for abdominal pain and blood in stool.   Endocrine: Negative.    Genitourinary: Negative for difficulty urinating and hematuria.   Musculoskeletal: Positive for back pain and neck pain. Negative for joint swelling.   Skin: Negative for color change and rash.   Allergic/Immunologic: Negative.    Neurological: Positive for dizziness and headaches. Negative for syncope and speech difficulty.   Hematological: Negative for adenopathy.   Psychiatric/Behavioral: Positive for decreased concentration. Negative for agitation and confusion. The patient is nervous/anxious.     All other systems reviewed and are negative.      Objective   Physical Exam   Constitutional: She is oriented to person, place, and time. She appears well-developed and well-nourished. No distress.   HENT:   Head: Normocephalic and atraumatic.   Eyes: Conjunctivae and EOM are normal. Pupils are equal, round, and reactive to light. Right eye exhibits no discharge. Left eye exhibits no discharge. No scleral icterus.   Neck: Normal range of motion. Neck supple. No tracheal deviation present. No thyromegaly present.   Cardiovascular: Normal rate, regular rhythm, normal heart sounds, intact distal pulses and normal pulses.  Exam reveals no gallop.    No murmur heard.  Pulmonary/Chest: Effort normal and breath sounds normal. No respiratory distress. She has no wheezes. She has no rales.   Musculoskeletal: Normal range of motion.   Neurological: She is alert and oriented to person, place, and time. She exhibits normal muscle tone. Coordination normal.   Skin: Skin is warm. No rash noted. No erythema. No pallor.   Psychiatric: She has a normal mood and affect. Her behavior is normal. Judgment and thought content normal.   Nursing note and vitals reviewed.      Assessment/Plan   Problems Addressed this Visit        Cardiovascular and Mediastinum    Intractable migraine with aura without status migrainosus - Primary    Relevant Orders    Comprehensive metabolic panel    Lipid panel    CBC and Differential    TSH    T4, Free    T3, Free    Vitamin D 25 Hydroxy    Vitamin B12    Folate    Ferritin    MRI Brain With & Without Contrast    Ambulatory Referral to Neurology       Other    Anxiety    Relevant Orders    Comprehensive metabolic panel    Lipid panel    CBC and Differential    TSH    T4, Free    T3, Free    Vitamin D 25 Hydroxy    Vitamin B12    Folate    Ferritin    MRI Brain With & Without Contrast    Ambulatory Referral to Neurology      Other Visit Diagnoses     Laboratory examination ordered as part of a routine  general medical examination        Relevant Orders    Comprehensive metabolic panel    Lipid panel    CBC and Differential    TSH    T4, Free    T3, Free    Vitamin D 25 Hydroxy    Vitamin B12    Folate    Ferritin    MRI Brain With & Without Contrast    Ambulatory Referral to Neurology    Fatigue, unspecified type        Relevant Orders    Comprehensive metabolic panel    Lipid panel    CBC and Differential    TSH    T4, Free    T3, Free    Vitamin D 25 Hydroxy    Vitamin B12    Folate    Ferritin    MRI Brain With & Without Contrast    Ambulatory Referral to Neurology

## 2017-11-28 LAB
25(OH)D3+25(OH)D2 SERPL-MCNC: 22.2 NG/ML (ref 30–100)
ALBUMIN SERPL-MCNC: 4.1 G/DL (ref 3.5–5.2)
ALBUMIN/GLOB SERPL: 1.4 G/DL
ALP SERPL-CCNC: 75 U/L (ref 39–117)
ALT SERPL-CCNC: 41 U/L (ref 1–33)
AST SERPL-CCNC: 22 U/L (ref 1–32)
BASOPHILS # BLD AUTO: 0.04 10*3/MM3 (ref 0–0.2)
BASOPHILS NFR BLD AUTO: 0.5 % (ref 0–1.5)
BILIRUB SERPL-MCNC: 0.4 MG/DL (ref 0.1–1.2)
BUN SERPL-MCNC: 14 MG/DL (ref 6–20)
BUN/CREAT SERPL: 14.7 (ref 7–25)
CALCIUM SERPL-MCNC: 9.1 MG/DL (ref 8.6–10.5)
CHLORIDE SERPL-SCNC: 105 MMOL/L (ref 98–107)
CHOLEST SERPL-MCNC: 150 MG/DL (ref 0–200)
CO2 SERPL-SCNC: 22.3 MMOL/L (ref 22–29)
CREAT SERPL-MCNC: 0.95 MG/DL (ref 0.57–1)
EOSINOPHIL # BLD AUTO: 0.06 10*3/MM3 (ref 0–0.7)
EOSINOPHIL NFR BLD AUTO: 0.8 % (ref 0.3–6.2)
ERYTHROCYTE [DISTWIDTH] IN BLOOD BY AUTOMATED COUNT: 12.9 % (ref 11.7–13)
FERRITIN SERPL-MCNC: 41.11 NG/ML (ref 13–150)
FOLATE SERPL-MCNC: 10.66 NG/ML (ref 4.78–24.2)
GFR SERPLBLD CREATININE-BSD FMLA CKD-EPI: 69 ML/MIN/1.73
GFR SERPLBLD CREATININE-BSD FMLA CKD-EPI: 84 ML/MIN/1.73
GLOBULIN SER CALC-MCNC: 2.9 GM/DL
GLUCOSE SERPL-MCNC: 78 MG/DL (ref 65–99)
HCT VFR BLD AUTO: 43.3 % (ref 35.6–45.5)
HDLC SERPL-MCNC: 37 MG/DL (ref 40–60)
HGB BLD-MCNC: 13.8 G/DL (ref 11.9–15.5)
IMM GRANULOCYTES # BLD: 0.02 10*3/MM3 (ref 0–0.03)
IMM GRANULOCYTES NFR BLD: 0.3 % (ref 0–0.5)
LDLC SERPL CALC-MCNC: 100 MG/DL (ref 0–100)
LYMPHOCYTES # BLD AUTO: 1.93 10*3/MM3 (ref 0.9–4.8)
LYMPHOCYTES NFR BLD AUTO: 26.1 % (ref 19.6–45.3)
MCH RBC QN AUTO: 30.2 PG (ref 26.9–32)
MCHC RBC AUTO-ENTMCNC: 31.9 G/DL (ref 32.4–36.3)
MCV RBC AUTO: 94.7 FL (ref 80.5–98.2)
MONOCYTES # BLD AUTO: 0.32 10*3/MM3 (ref 0.2–1.2)
MONOCYTES NFR BLD AUTO: 4.3 % (ref 5–12)
NEUTROPHILS # BLD AUTO: 5.03 10*3/MM3 (ref 1.9–8.1)
NEUTROPHILS NFR BLD AUTO: 68 % (ref 42.7–76)
PLATELET # BLD AUTO: 273 10*3/MM3 (ref 140–500)
POTASSIUM SERPL-SCNC: 3.9 MMOL/L (ref 3.5–5.2)
PROT SERPL-MCNC: 7 G/DL (ref 6–8.5)
RBC # BLD AUTO: 4.57 10*6/MM3 (ref 3.9–5.2)
SODIUM SERPL-SCNC: 141 MMOL/L (ref 136–145)
T3FREE SERPL-MCNC: 2.9 PG/ML (ref 2–4.4)
T4 FREE SERPL-MCNC: 1.2 NG/DL (ref 0.93–1.7)
TRIGL SERPL-MCNC: 65 MG/DL (ref 0–150)
TSH SERPL DL<=0.005 MIU/L-ACNC: 1.39 MIU/ML (ref 0.27–4.2)
VIT B12 SERPL-MCNC: 475 PG/ML (ref 211–946)
VLDLC SERPL CALC-MCNC: 13 MG/DL (ref 5–40)
WBC # BLD AUTO: 7.4 10*3/MM3 (ref 4.5–10.7)

## 2018-02-11 RX ORDER — LINACLOTIDE 290 UG/1
CAPSULE, GELATIN COATED ORAL
Qty: 30 CAPSULE | Refills: 11 | Status: SHIPPED | OUTPATIENT
Start: 2018-02-11 | End: 2018-06-25 | Stop reason: SDUPTHER

## 2018-07-10 RX ORDER — PANTOPRAZOLE SODIUM 40 MG/1
40 TABLET, DELAYED RELEASE ORAL DAILY
Qty: 30 TABLET | Refills: 11 | Status: SHIPPED | OUTPATIENT
Start: 2018-07-10 | End: 2019-05-31 | Stop reason: SDUPTHER

## 2018-08-13 RX ORDER — DULOXETIN HYDROCHLORIDE 30 MG/1
CAPSULE, DELAYED RELEASE ORAL
Qty: 30 CAPSULE | Refills: 5 | Status: SHIPPED | OUTPATIENT
Start: 2018-08-13 | End: 2018-10-23 | Stop reason: ALTCHOICE

## 2018-09-24 ENCOUNTER — OFFICE VISIT (OUTPATIENT)
Dept: FAMILY MEDICINE CLINIC | Facility: CLINIC | Age: 33
End: 2018-09-24

## 2018-09-24 VITALS
WEIGHT: 174 LBS | TEMPERATURE: 98.3 F | HEIGHT: 63 IN | RESPIRATION RATE: 16 BRPM | DIASTOLIC BLOOD PRESSURE: 77 MMHG | OXYGEN SATURATION: 99 % | HEART RATE: 88 BPM | BODY MASS INDEX: 30.83 KG/M2 | SYSTOLIC BLOOD PRESSURE: 115 MMHG

## 2018-09-24 DIAGNOSIS — R10.33 ACUTE PERIUMBILICAL PAIN: Primary | ICD-10-CM

## 2018-09-24 DIAGNOSIS — K21.9 GASTROESOPHAGEAL REFLUX DISEASE, ESOPHAGITIS PRESENCE NOT SPECIFIED: ICD-10-CM

## 2018-09-24 PROCEDURE — 99214 OFFICE O/P EST MOD 30 MIN: CPT | Performed by: NURSE PRACTITIONER

## 2018-09-24 NOTE — PROGRESS NOTES
Subjective   Adry Knight is a 33 y.o. female.     History of Present Illness   Adry Knight 33 y.o. female who presents for evaluation of nausea, abdominal pain and GERD. Symptoms have been present for 1 week .  The condition is aggravated by eating any food . she is experiencing diarrhea , abdominal pain and bloating.  Reports pain is mid to right upper abdomen.  Alleviating factors are not eating with some help, but still symptoms . Patient denies fever, chills, constipation, melena, bright red blood in stool and vomiting her past medical history is notable for GERD.  Patient denies recent travel.  Mother and sister had cholecystectomy.    Had IUD removed in July.  Had LMP last week.     The following portions of the patient's history were reviewed and updated as appropriate: allergies, current medications, past family history, past medical history, past social history, past surgical history and problem list.    Review of Systems   Constitutional: Negative for unexpected weight change.   Respiratory: Negative for shortness of breath.    Cardiovascular: Negative for chest pain and palpitations.   Gastrointestinal: Positive for nausea. Negative for abdominal distention, abdominal pain, anal bleeding, blood in stool, constipation, diarrhea, rectal pain and vomiting.   Endocrine: Negative for cold intolerance, heat intolerance, polydipsia, polyphagia and polyuria.   Psychiatric/Behavioral: Negative for behavioral problems.       Objective   Physical Exam   Constitutional: She is oriented to person, place, and time. She appears well-developed and well-nourished.   Pulmonary/Chest: Effort normal.   Abdominal: Normal appearance and bowel sounds are normal. There is tenderness in the epigastric area, periumbilical area and left upper quadrant. There is no rigidity, no rebound, no guarding, no tenderness at McBurney's point and negative Curtis's sign.   Neurological: She is alert and oriented to person,  place, and time.   Psychiatric: She has a normal mood and affect. Judgment normal.   Nursing note and vitals reviewed.      Assessment/Plan   Adry was seen today for gi problem and nausea.    Diagnoses and all orders for this visit:    Acute periumbilical pain  -     CT Abdomen Pelvis With Contrast    Gastroesophageal reflux disease, esophagitis presence not specified          To ER if pain worsens, fever or vomiting occurs.

## 2018-09-28 ENCOUNTER — APPOINTMENT (OUTPATIENT)
Dept: CT IMAGING | Facility: HOSPITAL | Age: 33
End: 2018-09-28

## 2018-09-30 RX ORDER — TOPIRAMATE 100 MG/1
TABLET, FILM COATED ORAL
Qty: 60 TABLET | Refills: 11 | Status: SHIPPED | OUTPATIENT
Start: 2018-09-30 | End: 2019-07-22 | Stop reason: SDUPTHER

## 2018-09-30 RX ORDER — RIZATRIPTAN BENZOATE 10 MG/1
TABLET ORAL
Qty: 12 TABLET | Refills: 11 | Status: SHIPPED | OUTPATIENT
Start: 2018-09-30 | End: 2019-11-20 | Stop reason: SDUPTHER

## 2018-10-03 ENCOUNTER — HOSPITAL ENCOUNTER (OUTPATIENT)
Dept: CT IMAGING | Facility: HOSPITAL | Age: 33
Discharge: HOME | End: 2018-10-03
Admitting: NURSE PRACTITIONER

## 2018-10-03 PROCEDURE — 74177 CT ABD & PELVIS W/CONTRAST: CPT

## 2018-10-03 PROCEDURE — 25010000002 IOPAMIDOL 61 % SOLUTION: Performed by: NURSE PRACTITIONER

## 2018-10-03 RX ADMIN — IOPAMIDOL 85 ML: 612 INJECTION, SOLUTION INTRAVENOUS at 17:10

## 2018-10-04 DIAGNOSIS — K82.8 SLUDGE IN GALLBLADDER: ICD-10-CM

## 2018-10-04 DIAGNOSIS — R10.11 RUQ PAIN: Primary | ICD-10-CM

## 2018-10-04 DIAGNOSIS — R11.0 NAUSEA: Primary | ICD-10-CM

## 2018-10-04 DIAGNOSIS — R10.10 PAIN OF UPPER ABDOMEN: ICD-10-CM

## 2018-10-04 DIAGNOSIS — R11.0 NAUSEA: ICD-10-CM

## 2018-10-07 LAB
ALBUMIN SERPL-MCNC: 4.2 G/DL (ref 3.5–5.5)
ALBUMIN/GLOB SERPL: 1.8 {RATIO} (ref 1.2–2.2)
ALP SERPL-CCNC: 61 IU/L (ref 39–117)
ALT SERPL-CCNC: 27 IU/L (ref 0–32)
AMYLASE SERPL-CCNC: 72 U/L (ref 31–124)
AST SERPL-CCNC: 16 IU/L (ref 0–40)
BILIRUB SERPL-MCNC: 0.4 MG/DL (ref 0–1.2)
BUN SERPL-MCNC: 11 MG/DL (ref 6–20)
BUN/CREAT SERPL: 13 (ref 9–23)
CALCIUM SERPL-MCNC: 9.3 MG/DL (ref 8.7–10.2)
CHLORIDE SERPL-SCNC: 104 MMOL/L (ref 96–106)
CO2 SERPL-SCNC: 24 MMOL/L (ref 20–29)
CREAT SERPL-MCNC: 0.82 MG/DL (ref 0.57–1)
GLOBULIN SER CALC-MCNC: 2.4 G/DL (ref 1.5–4.5)
GLUCOSE SERPL-MCNC: 81 MG/DL (ref 65–99)
HAV IGM SERPL QL IA: NEGATIVE
HBV CORE IGM SERPL QL IA: NEGATIVE
HBV SURFACE AG SERPL QL IA: NEGATIVE
HCV AB S/CO SERPL IA: <0.1 S/CO RATIO (ref 0–0.9)
LIPASE SERPL-CCNC: 23 U/L (ref 14–72)
POTASSIUM SERPL-SCNC: 4.4 MMOL/L (ref 3.5–5.2)
PROT SERPL-MCNC: 6.6 G/DL (ref 6–8.5)
SODIUM SERPL-SCNC: 141 MMOL/L (ref 134–144)

## 2018-10-09 DIAGNOSIS — R10.84 GENERALIZED ABDOMINAL PAIN: Primary | ICD-10-CM

## 2018-10-09 DIAGNOSIS — K59.04 CHRONIC IDIOPATHIC CONSTIPATION: ICD-10-CM

## 2018-10-09 DIAGNOSIS — R11.0 NAUSEA: ICD-10-CM

## 2018-10-10 DIAGNOSIS — R10.10 PAIN OF UPPER ABDOMEN: ICD-10-CM

## 2018-10-10 DIAGNOSIS — R11.0 NAUSEA: Primary | ICD-10-CM

## 2018-10-12 ENCOUNTER — HOSPITAL ENCOUNTER (OUTPATIENT)
Dept: NUCLEAR MEDICINE | Facility: HOSPITAL | Age: 33
Discharge: HOME | End: 2018-10-12

## 2018-10-12 PROCEDURE — A9537 TC99M MEBROFENIN: HCPCS | Performed by: PHYSICIAN ASSISTANT

## 2018-10-12 PROCEDURE — 78227 HEPATOBIL SYST IMAGE W/DRUG: CPT

## 2018-10-12 PROCEDURE — 0 TECHNETIUM TC 99M MEBROFENIN KIT: Performed by: PHYSICIAN ASSISTANT

## 2018-10-12 PROCEDURE — 78226 HEPATOBILIARY SYSTEM IMAGING: CPT

## 2018-10-12 RX ORDER — KIT FOR THE PREPARATION OF TECHNETIUM TC 99M MEBROFENIN 45 MG/10ML
1 INJECTION, POWDER, LYOPHILIZED, FOR SOLUTION INTRAVENOUS
Status: COMPLETED | OUTPATIENT
Start: 2018-10-12 | End: 2018-10-12

## 2018-10-12 RX ADMIN — MEBROFENIN 1 DOSE: 45 INJECTION, POWDER, LYOPHILIZED, FOR SOLUTION INTRAVENOUS at 08:20

## 2018-10-15 RX ORDER — BUSPIRONE HYDROCHLORIDE 10 MG/1
10 TABLET ORAL 2 TIMES DAILY
Qty: 60 TABLET | Refills: 2 | Status: SHIPPED | OUTPATIENT
Start: 2018-10-15 | End: 2019-01-25 | Stop reason: SDUPTHER

## 2018-10-17 DIAGNOSIS — R10.11 RIGHT UPPER QUADRANT ABDOMINAL PAIN: ICD-10-CM

## 2018-10-17 DIAGNOSIS — R93.2 ABNORMAL FINDINGS ON DIAGNOSTIC IMAGING OF GALLBLADDER: Primary | ICD-10-CM

## 2018-10-23 ENCOUNTER — OFFICE VISIT (OUTPATIENT)
Dept: SURGERY | Facility: CLINIC | Age: 33
End: 2018-10-23

## 2018-10-23 VITALS — WEIGHT: 173.6 LBS | OXYGEN SATURATION: 98 % | BODY MASS INDEX: 30.76 KG/M2 | HEIGHT: 63 IN | HEART RATE: 89 BPM

## 2018-10-23 DIAGNOSIS — K82.8 BILIARY DYSKINESIA: Primary | ICD-10-CM

## 2018-10-23 PROCEDURE — 99203 OFFICE O/P NEW LOW 30 MIN: CPT | Performed by: SURGERY

## 2018-10-23 RX ORDER — CEFAZOLIN SODIUM 2 G/100ML
2 INJECTION, SOLUTION INTRAVENOUS ONCE
Status: CANCELLED | OUTPATIENT
Start: 2018-10-24 | End: 2018-10-24

## 2018-10-23 NOTE — PROGRESS NOTES
General Surgery  Initial Office Visit    CC: Nausea, abdominal pain, biliary dyskinesia    HPI: The patient is a pleasant 33 y.o. year-old lady who presents today for evaluation of nausea and abdominal pain that has been occurring postprandially for the last few months.  She says the symptoms are intermittent but are always associated with any kind of by mouth intake.  It has gotten to the point now that she is completely miserable because she can't eat or drink anything without feeling extremely nauseated.  She has associated sharp left upper quadrant, epigastric, and right upper quadrant abdominal pain with radiation to the back.  She also has associated abdominal bloating.  The symptoms will last for a couple of hours after eating and then slowly resolve.  She had a CT of the abdomen/pelvis performed earlier this month which was fairly unremarkable besides showing a left-sided ovarian cyst.  She had a HIDA scan that showed her gallbladder ejection fraction was only 16%, so she was referred to see me to discuss possible cholecystectomy.    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 [facility]), works as a , nonsmoker, no regular alcohol use    ROS:   Constitutional: Positive for appetite change, fatigue, and fever; Negative for chills  HENT: Positive for nosebleeds; Negative for hearing loss or runny nose  Eyes: Negative for vision changes or scleral icterus  Respiratory: Negative for cough or shortness of breath  Cardiovascular: Positive for chest pain;  Negative for heart palpitations  Gastrointestinal: Positive for abdominal pain, abdominal distention, nausea, constipation, and reflux; Negative for vomiting, melena, or hematochezia  Genitourinary: Negative for hematuria or dysuria  Musculoskeletal: Positive for neck pain and back pain; Negative for joint pain  Neurologic: Positive for dizziness, headaches, and lightheadedness; Negative for seizures  Psychiatric: Positive for nervousness, sleep disturbance, anxiety, and depression  All other systems reviewed and negative    Physical Exam:  Vitals:    10/23/18 0941   Pulse: 89   SpO2: 98%     General: No acute distress, well-nourished & well-developed  HEAD: normocephalic, atraumatic  EYES: normal conjunctiva, sclera anicteric  EARS: grossly normal hearing  NECK: supple, no thyromegaly  CARDIOVASCULAR: regular rate and rhythm  RESPIRATORY: clear to auscultation bilaterally  GASTROINTESTINAL: soft, nontender, non-distended  MUSCULOSKELETAL: normal gait and station. No gross extremity abnormalities  PSYCHIATRIC: oriented x3, normal mood and affect    IMAGING:  HIDA SCAN (10/12/2018):  IMPRESSION:  No evidence for cystic duct or common duct obstruction. After giving 8  ounces Ensure by mouth, gallbladder ejection fraction was calculated at  16% which is low and this may be associated with biliary dyskinesia or  chronic cholecystitis.    CT ABD/PELVIS (10/3/2018):  IMPRESSION:  No acute abnormality within the abdomen and pelvis.    ASSESSMENT & PLAN  Mrs. Knight is a 33-year-old lady with biliary dyskinesia.  I personally reviewed her most recent CT scan as well as HIDA scan and I have recommended we proceed with a laparoscopic cholecystectomy with intraoperative cholangiogram.  I discussed the risks of the procedure to include bleeding, possible common bile duct injury, and possible wound infection.  Despite these risks, she has consented to proceed and I'm happy to get it scheduled for tomorrow.    Zuleima  MD [place]  General and Endoscopic Surgery  [facility]    [address], [address]  [place], [address]  P: [phone]  F: [phone]

## 2018-10-24 ENCOUNTER — ANESTHESIA EVENT (OUTPATIENT)
Dept: PERIOP | Facility: HOSPITAL | Age: 33
End: 2018-10-24

## 2018-10-24 ENCOUNTER — ANESTHESIA (OUTPATIENT)
Dept: PERIOP | Facility: HOSPITAL | Age: 33
End: 2018-10-24

## 2018-10-24 ENCOUNTER — HOSPITAL ENCOUNTER (OUTPATIENT)
Facility: HOSPITAL | Age: 33
Setting detail: HOSPITAL OUTPATIENT SURGERY
Discharge: HOME | End: 2018-10-24
Attending: SURGERY | Admitting: SURGERY

## 2018-10-24 ENCOUNTER — APPOINTMENT (OUTPATIENT)
Dept: GENERAL RADIOLOGY | Facility: HOSPITAL | Age: 33
End: 2018-10-24

## 2018-10-24 VITALS
RESPIRATION RATE: 20 BRPM | HEART RATE: 79 BPM | BODY MASS INDEX: 30.77 KG/M2 | SYSTOLIC BLOOD PRESSURE: 108 MMHG | DIASTOLIC BLOOD PRESSURE: 75 MMHG | WEIGHT: 173.72 LBS | TEMPERATURE: 97.6 F | OXYGEN SATURATION: 100 %

## 2018-10-24 DIAGNOSIS — K82.8 BILIARY DYSKINESIA: ICD-10-CM

## 2018-10-24 LAB
B-HCG UR QL: NEGATIVE
INTERNAL NEGATIVE CONTROL: NEGATIVE
INTERNAL POSITIVE CONTROL: POSITIVE
Lab: NORMAL

## 2018-10-24 PROCEDURE — 47563 LAPARO CHOLECYSTECTOMY/GRAPH: CPT | Performed by: SURGERY

## 2018-10-24 PROCEDURE — 47563 LAPARO CHOLECYSTECTOMY/GRAPH: CPT | Performed by: PHYSICIAN ASSISTANT

## 2018-10-24 PROCEDURE — 0 IOTHALAMATE 60 % SOLUTION: Performed by: SURGERY

## 2018-10-24 PROCEDURE — 25010000002 KETOROLAC TROMETHAMINE PER 15 MG: Performed by: NURSE ANESTHETIST, CERTIFIED REGISTERED

## 2018-10-24 PROCEDURE — 81025 URINE PREGNANCY TEST: CPT | Performed by: ANESTHESIOLOGY

## 2018-10-24 PROCEDURE — 25010000002 DEXAMETHASONE PER 1 MG: Performed by: NURSE ANESTHETIST, CERTIFIED REGISTERED

## 2018-10-24 PROCEDURE — 74300 X-RAY BILE DUCTS/PANCREAS: CPT

## 2018-10-24 PROCEDURE — 25010000002 HYDROMORPHONE 1 MG/ML SOLUTION: Performed by: ANESTHESIOLOGY

## 2018-10-24 PROCEDURE — 25010000002 MIDAZOLAM PER 1 MG: Performed by: ANESTHESIOLOGY

## 2018-10-24 PROCEDURE — 88304 TISSUE EXAM BY PATHOLOGIST: CPT | Performed by: SURGERY

## 2018-10-24 PROCEDURE — 25010000002 PROMETHAZINE PER 50 MG: Performed by: ANESTHESIOLOGY

## 2018-10-24 PROCEDURE — 25010000002 PROPOFOL 10 MG/ML EMULSION: Performed by: NURSE ANESTHETIST, CERTIFIED REGISTERED

## 2018-10-24 PROCEDURE — 25010000002 FENTANYL CITRATE (PF) 100 MCG/2ML SOLUTION: Performed by: NURSE ANESTHETIST, CERTIFIED REGISTERED

## 2018-10-24 PROCEDURE — 25010000003 CEFAZOLIN IN DEXTROSE 2-4 GM/100ML-% SOLUTION: Performed by: SURGERY

## 2018-10-24 PROCEDURE — 25010000002 MIDAZOLAM PER 1 MG: Performed by: NURSE ANESTHETIST, CERTIFIED REGISTERED

## 2018-10-24 PROCEDURE — 25010000002 ONDANSETRON PER 1 MG: Performed by: NURSE ANESTHETIST, CERTIFIED REGISTERED

## 2018-10-24 RX ORDER — FLUMAZENIL 0.1 MG/ML
0.2 INJECTION INTRAVENOUS AS NEEDED
Status: DISCONTINUED | OUTPATIENT
Start: 2018-10-24 | End: 2018-10-24 | Stop reason: HOSPADM

## 2018-10-24 RX ORDER — DEXAMETHASONE SODIUM PHOSPHATE 10 MG/ML
INJECTION INTRAMUSCULAR; INTRAVENOUS AS NEEDED
Status: DISCONTINUED | OUTPATIENT
Start: 2018-10-24 | End: 2018-10-24 | Stop reason: SURG

## 2018-10-24 RX ORDER — MIDAZOLAM HYDROCHLORIDE 1 MG/ML
INJECTION INTRAMUSCULAR; INTRAVENOUS AS NEEDED
Status: DISCONTINUED | OUTPATIENT
Start: 2018-10-24 | End: 2018-10-24 | Stop reason: SURG

## 2018-10-24 RX ORDER — MAGNESIUM HYDROXIDE 1200 MG/15ML
LIQUID ORAL AS NEEDED
Status: DISCONTINUED | OUTPATIENT
Start: 2018-10-24 | End: 2018-10-24 | Stop reason: HOSPADM

## 2018-10-24 RX ORDER — FAMOTIDINE 10 MG/ML
20 INJECTION, SOLUTION INTRAVENOUS ONCE
Status: COMPLETED | OUTPATIENT
Start: 2018-10-24 | End: 2018-10-24

## 2018-10-24 RX ORDER — CEFAZOLIN SODIUM 2 G/100ML
2 INJECTION, SOLUTION INTRAVENOUS ONCE
Status: COMPLETED | OUTPATIENT
Start: 2018-10-24 | End: 2018-10-24

## 2018-10-24 RX ORDER — LIDOCAINE HYDROCHLORIDE 20 MG/ML
INJECTION, SOLUTION INFILTRATION; PERINEURAL AS NEEDED
Status: DISCONTINUED | OUTPATIENT
Start: 2018-10-24 | End: 2018-10-24 | Stop reason: SURG

## 2018-10-24 RX ORDER — PROMETHAZINE HYDROCHLORIDE 25 MG/1
25 TABLET ORAL ONCE AS NEEDED
Status: COMPLETED | OUTPATIENT
Start: 2018-10-24 | End: 2018-10-24

## 2018-10-24 RX ORDER — SODIUM CHLORIDE 0.9 % (FLUSH) 0.9 %
3 SYRINGE (ML) INJECTION EVERY 12 HOURS SCHEDULED
Status: DISCONTINUED | OUTPATIENT
Start: 2018-10-24 | End: 2018-10-24 | Stop reason: HOSPADM

## 2018-10-24 RX ORDER — SODIUM CHLORIDE 0.9 % (FLUSH) 0.9 %
3-10 SYRINGE (ML) INJECTION AS NEEDED
Status: DISCONTINUED | OUTPATIENT
Start: 2018-10-24 | End: 2018-10-24 | Stop reason: HOSPADM

## 2018-10-24 RX ORDER — PROMETHAZINE HYDROCHLORIDE 25 MG/1
25 SUPPOSITORY RECTAL ONCE AS NEEDED
Status: COMPLETED | OUTPATIENT
Start: 2018-10-24 | End: 2018-10-24

## 2018-10-24 RX ORDER — FENTANYL CITRATE 50 UG/ML
INJECTION, SOLUTION INTRAMUSCULAR; INTRAVENOUS AS NEEDED
Status: DISCONTINUED | OUTPATIENT
Start: 2018-10-24 | End: 2018-10-24 | Stop reason: SURG

## 2018-10-24 RX ORDER — PROMETHAZINE HYDROCHLORIDE 25 MG/ML
6.25 INJECTION, SOLUTION INTRAMUSCULAR; INTRAVENOUS ONCE AS NEEDED
Status: COMPLETED | OUTPATIENT
Start: 2018-10-24 | End: 2018-10-24

## 2018-10-24 RX ORDER — MIDAZOLAM HYDROCHLORIDE 1 MG/ML
1 INJECTION INTRAMUSCULAR; INTRAVENOUS
Status: DISCONTINUED | OUTPATIENT
Start: 2018-10-24 | End: 2018-10-24 | Stop reason: HOSPADM

## 2018-10-24 RX ORDER — SCOLOPAMINE TRANSDERMAL SYSTEM 1 MG/1
1 PATCH, EXTENDED RELEASE TRANSDERMAL CONTINUOUS
Status: DISCONTINUED | OUTPATIENT
Start: 2018-10-24 | End: 2018-10-24 | Stop reason: HOSPADM

## 2018-10-24 RX ORDER — NALOXONE HCL 0.4 MG/ML
0.4 VIAL (ML) INJECTION AS NEEDED
Status: DISCONTINUED | OUTPATIENT
Start: 2018-10-24 | End: 2018-10-24 | Stop reason: HOSPADM

## 2018-10-24 RX ORDER — GLYCOPYRROLATE 0.2 MG/ML
INJECTION INTRAMUSCULAR; INTRAVENOUS AS NEEDED
Status: DISCONTINUED | OUTPATIENT
Start: 2018-10-24 | End: 2018-10-24 | Stop reason: SURG

## 2018-10-24 RX ORDER — ONDANSETRON 2 MG/ML
INJECTION INTRAMUSCULAR; INTRAVENOUS AS NEEDED
Status: DISCONTINUED | OUTPATIENT
Start: 2018-10-24 | End: 2018-10-24 | Stop reason: SURG

## 2018-10-24 RX ORDER — FENTANYL CITRATE 50 UG/ML
25 INJECTION, SOLUTION INTRAMUSCULAR; INTRAVENOUS
Status: DISCONTINUED | OUTPATIENT
Start: 2018-10-24 | End: 2018-10-24 | Stop reason: HOSPADM

## 2018-10-24 RX ORDER — EPHEDRINE SULFATE 50 MG/ML
5 INJECTION, SOLUTION INTRAVENOUS ONCE AS NEEDED
Status: DISCONTINUED | OUTPATIENT
Start: 2018-10-24 | End: 2018-10-24 | Stop reason: HOSPADM

## 2018-10-24 RX ORDER — MIDAZOLAM HYDROCHLORIDE 1 MG/ML
2 INJECTION INTRAMUSCULAR; INTRAVENOUS
Status: DISCONTINUED | OUTPATIENT
Start: 2018-10-24 | End: 2018-10-24 | Stop reason: HOSPADM

## 2018-10-24 RX ORDER — LABETALOL HYDROCHLORIDE 5 MG/ML
5 INJECTION, SOLUTION INTRAVENOUS
Status: DISCONTINUED | OUTPATIENT
Start: 2018-10-24 | End: 2018-10-24 | Stop reason: HOSPADM

## 2018-10-24 RX ORDER — OXYCODONE HYDROCHLORIDE AND ACETAMINOPHEN 5; 325 MG/1; MG/1
1 TABLET ORAL ONCE AS NEEDED
Status: DISCONTINUED | OUTPATIENT
Start: 2018-10-24 | End: 2018-10-24 | Stop reason: HOSPADM

## 2018-10-24 RX ORDER — KETOROLAC TROMETHAMINE 30 MG/ML
INJECTION, SOLUTION INTRAMUSCULAR; INTRAVENOUS AS NEEDED
Status: DISCONTINUED | OUTPATIENT
Start: 2018-10-24 | End: 2018-10-24 | Stop reason: SURG

## 2018-10-24 RX ORDER — ONDANSETRON 2 MG/ML
4 INJECTION INTRAMUSCULAR; INTRAVENOUS ONCE AS NEEDED
Status: DISCONTINUED | OUTPATIENT
Start: 2018-10-24 | End: 2018-10-24 | Stop reason: HOSPADM

## 2018-10-24 RX ORDER — PROMETHAZINE HYDROCHLORIDE 25 MG/ML
12.5 INJECTION, SOLUTION INTRAMUSCULAR; INTRAVENOUS ONCE AS NEEDED
Status: COMPLETED | OUTPATIENT
Start: 2018-10-24 | End: 2018-10-24

## 2018-10-24 RX ORDER — DIPHENHYDRAMINE HCL 25 MG
25 CAPSULE ORAL EVERY 6 HOURS PRN
Status: DISCONTINUED | OUTPATIENT
Start: 2018-10-24 | End: 2018-10-24 | Stop reason: HOSPADM

## 2018-10-24 RX ORDER — SODIUM CHLORIDE 9 MG/ML
INJECTION, SOLUTION INTRAVENOUS AS NEEDED
Status: DISCONTINUED | OUTPATIENT
Start: 2018-10-24 | End: 2018-10-24 | Stop reason: HOSPADM

## 2018-10-24 RX ORDER — PROMETHAZINE HYDROCHLORIDE 25 MG/1
12.5 TABLET ORAL ONCE AS NEEDED
Status: DISCONTINUED | OUTPATIENT
Start: 2018-10-24 | End: 2018-10-24 | Stop reason: HOSPADM

## 2018-10-24 RX ORDER — PROPOFOL 10 MG/ML
VIAL (ML) INTRAVENOUS AS NEEDED
Status: DISCONTINUED | OUTPATIENT
Start: 2018-10-24 | End: 2018-10-24 | Stop reason: SURG

## 2018-10-24 RX ORDER — BUPIVACAINE HYDROCHLORIDE 2.5 MG/ML
INJECTION, SOLUTION EPIDURAL; INFILTRATION; INTRACAUDAL AS NEEDED
Status: DISCONTINUED | OUTPATIENT
Start: 2018-10-24 | End: 2018-10-24 | Stop reason: HOSPADM

## 2018-10-24 RX ORDER — OXYCODONE HYDROCHLORIDE AND ACETAMINOPHEN 5; 325 MG/1; MG/1
1 TABLET ORAL EVERY 4 HOURS PRN
Qty: 40 TABLET | Refills: 0 | Status: SHIPPED | OUTPATIENT
Start: 2018-10-24 | End: 2019-01-25

## 2018-10-24 RX ORDER — SODIUM CHLORIDE, SODIUM LACTATE, POTASSIUM CHLORIDE, CALCIUM CHLORIDE 600; 310; 30; 20 MG/100ML; MG/100ML; MG/100ML; MG/100ML
9 INJECTION, SOLUTION INTRAVENOUS CONTINUOUS
Status: DISCONTINUED | OUTPATIENT
Start: 2018-10-24 | End: 2018-10-24 | Stop reason: HOSPADM

## 2018-10-24 RX ADMIN — SCOPOLAMINE 1 PATCH: 1 PATCH, EXTENDED RELEASE TRANSDERMAL at 11:35

## 2018-10-24 RX ADMIN — MIDAZOLAM 2 MG: 1 INJECTION INTRAMUSCULAR; INTRAVENOUS at 12:28

## 2018-10-24 RX ADMIN — LIDOCAINE HYDROCHLORIDE 60 MG: 20 INJECTION, SOLUTION INFILTRATION; PERINEURAL at 12:32

## 2018-10-24 RX ADMIN — FENTANYL CITRATE 50 MCG: 50 INJECTION INTRAMUSCULAR; INTRAVENOUS at 13:29

## 2018-10-24 RX ADMIN — FENTANYL CITRATE 50 MCG: 50 INJECTION INTRAMUSCULAR; INTRAVENOUS at 13:14

## 2018-10-24 RX ADMIN — FENTANYL CITRATE 50 MCG: 50 INJECTION INTRAMUSCULAR; INTRAVENOUS at 12:52

## 2018-10-24 RX ADMIN — CEFAZOLIN SODIUM 2 G: 2 INJECTION, SOLUTION INTRAVENOUS at 12:27

## 2018-10-24 RX ADMIN — HYDROMORPHONE HYDROCHLORIDE 0.5 MG: 1 INJECTION, SOLUTION INTRAMUSCULAR; INTRAVENOUS; SUBCUTANEOUS at 13:55

## 2018-10-24 RX ADMIN — HYDROMORPHONE HYDROCHLORIDE 0.5 MG: 1 INJECTION, SOLUTION INTRAMUSCULAR; INTRAVENOUS; SUBCUTANEOUS at 15:16

## 2018-10-24 RX ADMIN — GLYCOPYRROLATE 0.2 MG: 0.2 INJECTION INTRAMUSCULAR; INTRAVENOUS at 12:47

## 2018-10-24 RX ADMIN — FENTANYL CITRATE 50 MCG: 50 INJECTION INTRAMUSCULAR; INTRAVENOUS at 12:33

## 2018-10-24 RX ADMIN — DEXAMETHASONE SODIUM PHOSPHATE 8 MG: 10 INJECTION INTRAMUSCULAR; INTRAVENOUS at 12:41

## 2018-10-24 RX ADMIN — MIDAZOLAM HYDROCHLORIDE 2 MG: 2 INJECTION, SOLUTION INTRAMUSCULAR; INTRAVENOUS at 11:36

## 2018-10-24 RX ADMIN — OXYCODONE HYDROCHLORIDE AND ACETAMINOPHEN 1 TABLET: 5; 325 TABLET ORAL at 15:09

## 2018-10-24 RX ADMIN — PROMETHAZINE HYDROCHLORIDE 6.25 MG: 25 INJECTION INTRAMUSCULAR; INTRAVENOUS at 13:35

## 2018-10-24 RX ADMIN — ONDANSETRON 4 MG: 2 INJECTION INTRAMUSCULAR; INTRAVENOUS at 12:59

## 2018-10-24 RX ADMIN — PROPOFOL 200 MG: 10 INJECTION, EMULSION INTRAVENOUS at 12:33

## 2018-10-24 RX ADMIN — KETOROLAC TROMETHAMINE 30 MG: 30 INJECTION, SOLUTION INTRAMUSCULAR; INTRAVENOUS at 13:12

## 2018-10-24 RX ADMIN — SUGAMMADEX 300 MG: 100 INJECTION, SOLUTION INTRAVENOUS at 13:13

## 2018-10-24 RX ADMIN — SODIUM CHLORIDE, POTASSIUM CHLORIDE, SODIUM LACTATE AND CALCIUM CHLORIDE: 600; 310; 30; 20 INJECTION, SOLUTION INTRAVENOUS at 13:22

## 2018-10-24 RX ADMIN — SODIUM CHLORIDE, POTASSIUM CHLORIDE, SODIUM LACTATE AND CALCIUM CHLORIDE: 600; 310; 30; 20 INJECTION, SOLUTION INTRAVENOUS at 11:27

## 2018-10-24 RX ADMIN — FAMOTIDINE 20 MG: 10 INJECTION, SOLUTION INTRAVENOUS at 11:36

## 2018-10-24 NOTE — OP NOTE
Operative Note :  Zuleima Johansen MD    Adry Knight  1985    Procedure Date: 10/24/18    Pre-op Diagnosis:  Biliary dyskinesia [K82.8]    Post-op Diagnosis:  Biliary dyskinesia [K82.8]    Procedure:   Laparoscopic cholecystectomy with intraoperative cholangiogram    Surgeon: Zuleima Johansen MD    Assistant: Bruce MOJICA    Anesthesia:  General (general endotracheal tube)    Estimated Blood Loss: minimal    Specimens:  Gallbladder    Complications: None    Indications:  Mrs. Knight is a 33-year-old lady with biliary dyskinesia.  I personally reviewed her most recent CT scan as well as HIDA scan and I have recommended we proceed with a laparoscopic cholecystectomy with intraoperative cholangiogram.  I discussed the risks of the procedure to include bleeding, possible common bile duct injury, and possible wound infection. Despite these risks, she has consented to proceed    Findings: Normal cholangiogram    Description of procedure:  The patient was brought to the operating room and placed on the OR table in supine position.  An endotracheal tube was inserted, and general anesthesia was induced.  The anterior abdominal wall was shaved, prepped, and draped in a sterile fashion.  A surgical timeout was then completed.  A curvilinear infraumbilical skin incision was made after instillation of 0.5% Marcaine with epinephrine.  The umbilical stalk was grasped and elevated, and a longitudinal fasciotomy made.  A Rivera trocar was inserted and secured with 2 separate 0 Vicryl stay sutures.  The abdomen was then insufflated.  Under direct visualization, 3 additional 5 mm trocars were then placed within the subxiphoid and subcostal space on the right.  The gallbladder was retracted cephalad and infundibulum retracted inferiorly.  The cystic duct and cystic artery were slowly dissected out circumferentially until a firm critical view was obtained.  A single Hemoclip was placed across the cystic duct at  its junction with the gallbladder infundibulum and 3 hemoclips were placed across the cystic artery.  A small hole was created on the anterior wall of the cystic duct, and a cholangiogram catheter inserted through a right upper quadrant angiocatheter.  The catheter was secured within the duct with an additional Hemoclip and a cholangiogram was then obtained.  The cholangiogram showed normal filling of the cystic duct and common bile duct, retrograde filling of the right and left intrahepatic ducts, and easy spillage into the duodenum.  There were no filling defects appreciable.  The catheter was then withdrawn and 2 additional hemoclips placed across the cystic duct.  The cystic duct and artery were then transected, leaving 2 clips behind on both of the stumps.  The gallbladder was then slowly dissected off of the undersurface of the liver using electrocautery and it was removed from the peritoneal cavity within an Endo Catch bag at the umbilical trocar site.  The gallbladder was passed off to pathology.  The abdomen was then reinsufflated and right upper quadrant inspected. Warm normal saline was irrigated and suctioned dry.  The gallbladder fossa appeared hemostatic.  All trocars were then removed under direct visualization and the abdomen desufflated.  The umbilical fascial incision was closed using the previously placed 0 Vicryl stay sutures, all skin incisions closed with 4-0 Vicryl subcuticular stitches, and then each was dressed with Dermabond.  The patient was then extubated and transferred to PACU in stable condition.  All counts were correct per nursing.    Zuleima Johansen MD  General and Endoscopic Surgery  [facility]    [address], [address]  [place], [place], [address]  P: [phone]  F: [phone]

## 2018-10-24 NOTE — ANESTHESIA PREPROCEDURE EVALUATION
Anesthesia Evaluation     history of anesthetic complications: PONV               Airway   Mallampati: I  TM distance: >3 FB  Neck ROM: full  no difficulty expected  Dental - normal exam     Pulmonary - negative pulmonary ROS and normal exam   (-) COPD, asthma, sleep apnea, not a smoker    PE comment: nonlabored  Cardiovascular - negative cardio ROS and normal exam    Rhythm: regular  Rate: normal    (-) hypertension, valvular problems/murmurs, past MI, CAD, dysrhythmias, angina      Neuro/Psych  (+) headaches, psychiatric history Anxiety and Depression,     (-) seizures, TIA, CVA  GI/Hepatic/Renal/Endo    (+)  GERD well controlled,  liver disease (elevated LFTs),   (-) diabetes, hypothyroidism, hyperthyroidism    ROS Comment: Dysphagia;  Biliary Dyskinesia    Musculoskeletal     (+) neck pain,   Abdominal    Substance History      OB/GYN          Other                      Anesthesia Plan    ASA 2     general     intravenous induction   Anesthetic plan, all risks, benefits, and alternatives have been provided, discussed and informed consent has been obtained with: patient.

## 2018-10-24 NOTE — INTERVAL H&P NOTE
H&P reviewed. The patient was examined and there are no changes to the H&P.       Zuleima Johansen MD  General and Endoscopic Surgery  [facility]    [address], [address]  [place], [place], [address]  P: [phone]  F: [phone]

## 2018-10-24 NOTE — ANESTHESIA POSTPROCEDURE EVALUATION
Patient: Adry DYER Prophater    Procedure Summary     Date:  10/24/18 Room / Location:   [facility]  /  [facility]    Anesthesia Start:  1227 Anesthesia Stop:  1330    Procedure:  LAPAROSCOPIC CHOLECYSTECTOMY WITH INTRAOPERATIVE CHOLANGIOGRAM, POSSIBLE OPEN (N/A Abdomen) Diagnosis:       Biliary dyskinesia      (Biliary dyskinesia [K82.8])    Surgeon:  Zuleima Johansen MD Provider:  Bruce Hoskins MD    Anesthesia Type:  general ASA Status:  2          Anesthesia Type: general  Last vitals  BP   129/95 (10/24/18 1415)   Temp   36.4 °C (97.6 °F) (10/24/18 1328)   Pulse   83 (10/24/18 1415)   Resp   16 (10/24/18 1415)     SpO2   98 % (10/24/18 1415)     Post Anesthesia Care and Evaluation    Patient location during evaluation: bedside  Patient participation: complete - patient participated  Level of consciousness: awake and alert  Pain management: adequate  Airway patency: patent  Anesthetic complications: No anesthetic complications    Cardiovascular status: acceptable  Respiratory status: acceptable  Hydration status: acceptable    Comments: /95   Pulse 83   Temp 36.4 °C (97.6 °F) (Oral)   Resp 16   Wt 78.8 kg (173 lb 11.6 oz)   [facility] 09/24/2018   SpO2 98%   BMI 30.77 kg/m²

## 2018-10-24 NOTE — ANESTHESIA PROCEDURE NOTES
Airway  Urgency: elective    Airway not difficult    General Information and Staff    Patient location during procedure: OR  Anesthesiologist: VIKI CHAO  CRNA: DIPTI HAMPTON    Indications and Patient Condition  Indications for airway management: airway protection    Preoxygenated: yes  MILS not maintained throughout  Mask difficulty assessment: 1 - vent by mask    Final Airway Details  Final airway type: endotracheal airway      Successful airway: ETT  Cuffed: yes   Successful intubation technique: direct laryngoscopy  Facilitating devices/methods: intubating stylet  Endotracheal tube insertion site: oral  Blade: Linda  Blade size: 3  ETT size: 7.0 mm  Cormack-Lehane Classification: grade I - full view of glottis  Placement verified by: chest auscultation   Cuff volume (mL): 8  Measured from: lips  ETT to lips (cm): 20  Number of attempts at approach: 1    Additional Comments  PreO2 100% face mask, IV induction, easy mask, DVL x1, cords noted, tube through, cuff up, EBBSH, +etCO2, = chest movement, tube secured in place, atraumatic, teeth and lips intact as preop.

## 2018-10-24 NOTE — H&P (VIEW-ONLY)
General Surgery  Initial Office Visit    CC: Nausea, abdominal pain, biliary dyskinesia    HPI: The patient is a pleasant 33 y.o. year-old lady who presents today for evaluation of nausea and abdominal pain that has been occurring postprandially for the last few months.  She says the symptoms are intermittent but are always associated with any kind of by mouth intake.  It has gotten to the point now that she is completely miserable because she can't eat or drink anything without feeling extremely nauseated.  She has associated sharp left upper quadrant, epigastric, and right upper quadrant abdominal pain with radiation to the back.  She also has associated abdominal bloating.  The symptoms will last for a couple of hours after eating and then slowly resolve.  She had a CT of the abdomen/pelvis performed earlier this month which was fairly unremarkable besides showing a left-sided ovarian cyst.  She had a HIDA scan that showed her gallbladder ejection fraction was only 16%, so she was referred to see me to discuss possible cholecystectomy.    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 [employer]), works as a , nonsmoker, no regular alcohol use    ROS:   Constitutional: Positive for appetite change, fatigue, and fever; Negative for chills  HENT: Positive for nosebleeds; Negative for hearing loss or runny nose  Eyes: Negative for vision changes or scleral icterus  Respiratory: Negative for cough or shortness of breath  Cardiovascular: Positive for chest pain;  Negative for heart palpitations  Gastrointestinal: Positive for abdominal pain, abdominal distention, nausea, constipation, and reflux; Negative for vomiting, melena, or hematochezia  Genitourinary: Negative for hematuria or dysuria  Musculoskeletal: Positive for neck pain and back pain; Negative for joint pain  Neurologic: Positive for dizziness, headaches, and lightheadedness; Negative for seizures  Psychiatric: Positive for nervousness, sleep disturbance, anxiety, and depression  All other systems reviewed and negative    Physical Exam:  Vitals:    10/23/18 0941   Pulse: 89   SpO2: 98%     General: No acute distress, well-nourished & well-developed  HEAD: normocephalic, atraumatic  EYES: normal conjunctiva, sclera anicteric  EARS: grossly normal hearing  NECK: supple, no thyromegaly  CARDIOVASCULAR: regular rate and rhythm  RESPIRATORY: clear to auscultation bilaterally  GASTROINTESTINAL: soft, nontender, non-distended  MUSCULOSKELETAL: normal gait and station. No gross extremity abnormalities  PSYCHIATRIC: oriented x3, normal mood and affect    IMAGING:  HIDA SCAN (10/12/2018):  IMPRESSION:  No evidence for cystic duct or common duct obstruction. After giving 8  ounces Ensure by mouth, gallbladder ejection fraction was calculated at  16% which is low and this may be associated with biliary dyskinesia or  chronic cholecystitis.    CT ABD/PELVIS (10/3/2018):  IMPRESSION:  No acute abnormality within the abdomen and pelvis.    ASSESSMENT & PLAN  Mrs. Knight is a 33-year-old lady with biliary dyskinesia.  I personally reviewed her most recent CT scan as well as HIDA scan and I have recommended we proceed with a laparoscopic cholecystectomy with intraoperative cholangiogram.  I discussed the risks of the procedure to include bleeding, possible common bile duct injury, and possible wound infection.  Despite these risks, she has consented to proceed and I'm happy to get it scheduled for tomorrow.    Zuleima  MD [place]  General and Endoscopic Surgery  [facility]    [address], [address]  [place], [place], [address]  P: [phone]  F: [phone]

## 2018-10-25 LAB
CYTO UR: NORMAL
LAB AP CASE REPORT: NORMAL
PATH REPORT.FINAL DX SPEC: NORMAL
PATH REPORT.GROSS SPEC: NORMAL

## 2019-01-09 DIAGNOSIS — F41.9 ANXIETY: ICD-10-CM

## 2019-01-09 RX ORDER — BUPROPION HYDROCHLORIDE 300 MG/1
TABLET ORAL
Qty: 30 TABLET | Refills: 5 | Status: SHIPPED | OUTPATIENT
Start: 2019-01-09 | End: 2019-05-31 | Stop reason: SDUPTHER

## 2019-01-17 RX ORDER — OSELTAMIVIR PHOSPHATE 75 MG/1
75 CAPSULE ORAL DAILY
Qty: 10 CAPSULE | Refills: 0 | Status: SHIPPED | OUTPATIENT
Start: 2019-01-17 | End: 2019-01-25

## 2019-01-25 ENCOUNTER — OFFICE VISIT (OUTPATIENT)
Dept: FAMILY MEDICINE CLINIC | Facility: CLINIC | Age: 34
End: 2019-01-25

## 2019-01-25 VITALS
SYSTOLIC BLOOD PRESSURE: 108 MMHG | DIASTOLIC BLOOD PRESSURE: 72 MMHG | BODY MASS INDEX: 31.36 KG/M2 | WEIGHT: 177 LBS | HEIGHT: 63 IN | TEMPERATURE: 97.9 F | OXYGEN SATURATION: 100 % | HEART RATE: 65 BPM

## 2019-01-25 DIAGNOSIS — F41.9 ANXIETY: ICD-10-CM

## 2019-01-25 DIAGNOSIS — G43.109 MIGRAINE WITH AURA AND WITHOUT STATUS MIGRAINOSUS, NOT INTRACTABLE: ICD-10-CM

## 2019-01-25 DIAGNOSIS — M54.50 ACUTE RIGHT-SIDED LOW BACK PAIN WITHOUT SCIATICA: Primary | ICD-10-CM

## 2019-01-25 DIAGNOSIS — M54.2 NECK PAIN: ICD-10-CM

## 2019-01-25 DIAGNOSIS — Z00.00 LABORATORY EXAMINATION ORDERED AS PART OF A ROUTINE GENERAL MEDICAL EXAMINATION: ICD-10-CM

## 2019-01-25 PROCEDURE — 72110 X-RAY EXAM L-2 SPINE 4/>VWS: CPT | Performed by: PHYSICIAN ASSISTANT

## 2019-01-25 PROCEDURE — 73521 X-RAY EXAM HIPS BI 2 VIEWS: CPT | Performed by: PHYSICIAN ASSISTANT

## 2019-01-25 PROCEDURE — 99214 OFFICE O/P EST MOD 30 MIN: CPT | Performed by: PHYSICIAN ASSISTANT

## 2019-01-25 PROCEDURE — 72050 X-RAY EXAM NECK SPINE 4/5VWS: CPT | Performed by: PHYSICIAN ASSISTANT

## 2019-01-25 RX ORDER — DESVENLAFAXINE SUCCINATE 50 MG/1
50 TABLET, EXTENDED RELEASE ORAL DAILY
Qty: 30 TABLET | Refills: 1 | Status: SHIPPED | OUTPATIENT
Start: 2019-01-25 | End: 2019-03-05

## 2019-01-25 RX ORDER — BUSPIRONE HYDROCHLORIDE 10 MG/1
10 TABLET ORAL 2 TIMES DAILY
Qty: 60 TABLET | Refills: 2 | Status: SHIPPED | OUTPATIENT
Start: 2019-01-25 | End: 2019-05-31 | Stop reason: SDUPTHER

## 2019-01-25 RX ORDER — NAPROXEN 500 MG/1
500 TABLET ORAL 2 TIMES DAILY WITH MEALS
Qty: 60 TABLET | Refills: 5 | Status: SHIPPED | OUTPATIENT
Start: 2019-01-25 | End: 2020-03-05

## 2019-01-25 RX ORDER — LIDOCAINE 50 MG/G
PATCH TOPICAL
Qty: 90 PATCH | Refills: 5 | Status: SHIPPED | OUTPATIENT
Start: 2019-01-25 | End: 2020-03-05

## 2019-01-25 RX ORDER — TIZANIDINE 4 MG/1
TABLET ORAL
Qty: 60 TABLET | Refills: 1 | Status: SHIPPED | OUTPATIENT
Start: 2019-01-25 | End: 2020-03-05

## 2019-01-25 NOTE — PATIENT INSTRUCTIONS
Low glycemic index diet  Exercise 30 minutes most days of the week  Make sure you get results on any labs or tests we ordered today  We discussed medications and how to take them as prescribed  Sleep 6-8 hours each night if possible  If you have not signed up for [employer], please activate your code ASAP from your After Visit Summary today    LDL goal <100  LDL goal if heart disease <70  HDL goal >60  Triglyceride goal <150  BP goal =<130/80  Fasting glucose <100    Stop NSAID if GI upset or any signs tarry or blood in stools    Warned patient that this medication can cause drowsiness and impair them operating machinery, including driving a car.  Caution is advised.

## 2019-01-25 NOTE — PROGRESS NOTES
Subjective   Adry Knight is a 33 y.o. female.     History of Present Illness   Adry Knight female 33 y.o. who presents today for neck and back pain, but, she is anxious and having some mild depression. ..  She reports anxiety and overwhelmed. Eye twitching. Chronic daily h/a's.  Had to stop Linzess d/t food allergy and has some abd pain with constipation.  She is on Miralax for now.. Onset of symptoms was approximately several years ago.  She denies current suicidal and homicidal ideation. Risk factors are family history of anxiety and or depression, lifestyle of multiple roles and PTSD from childhood.  Previous treatment includes Cymbalta and did help, had sexual side effects..  She complains of the following medication side effects: sexual dysfunction.  The patient has previously been in counseling..    Adry Knight 33 y.o. female presents for evaluation and treatment of  low back pain. The patient first noted symptoms a few weeks ago. It was not related to none recalled by the patient.  The pain intensity is mild and moderate , and is located right side  and lumbar. The pain is described as aching and sharp and occurs constantly. The symptoms are progressive. Symptoms are exacerbated by light exertion and standing as hairdressor . Factors which relieve the pain include nothing helps, NSAID's and muscle relaxants. Other associated symptoms include denies pain radiating down right leg, denies pain radiating down left leg, denies motor loss, denies sensory loss, denies burning in legs, denies N/T in legs, denies weakness in legs, denies loss of muscle tone and denies loss of bowel or bladder control. Previous history of symptoms: some episodes in the past; no surgery. Treatment efforts have included NSAID's and muscle relaxants , without relief.  I want to watch over these h/a's and see if improves on Pristiq trial.  May need to see neurologist and has been in the past.  Prior brain scans  negative.  Adry DYER Prophater 33 y.o. female who presents for evaluation of neck pain. Event that precipitated these symptoms:  none recalled by the patient  {Onset of symptoms was several weeks ago, and have been gradually worsening since that time.  Location of this is in the right , trapezius, denies motor loss in upper extremities, denies sensory loss in upper extremities, denies upper extremity weakness and denies upper extremity burning area.   Current pain symptoms are described as aching and spasmotic and occurs constantly.  The pain intensity is moderate and severe.  Other associated symptoms include:  spasms, aching and ROM pain. Treatment efforts have included: some help if can take Zanaflex, but drowsiness keeps her from daytime dosing. She is taking NSAIDs and not much help.. without relief.  Patient has had no prior neck problems and has had neck pain and strain in past,  She did go to PT.   Having bilat hip pain and is sore to touch and with ROM.  I will Xray them  Hx scoliosis; hip pain bilat and for mos  Needs Xray  The following portions of the patient's history were reviewed and updated as appropriate: allergies, current medications, past family history, past medical history, past social history, past surgical history and problem list.    Review of Systems   Constitutional: Positive for fatigue and unexpected weight change. Negative for fever.   HENT: Positive for trouble swallowing. Negative for nosebleeds.    Eyes: Negative for visual disturbance.   Respiratory: Negative for choking and stridor.    Cardiovascular: Negative for chest pain.   Gastrointestinal: Positive for constipation. Negative for blood in stool.   Endocrine: Negative for polydipsia.   Genitourinary: Negative for genital sores and hematuria.   Musculoskeletal: Negative for joint swelling.   Skin: Negative for color change and rash.   Allergic/Immunologic: Positive for environmental allergies and food allergies. Negative for  immunocompromised state.   Neurological: Positive for dizziness, speech difficulty, weakness, light-headedness, numbness and headaches. Negative for seizures and facial asymmetry.   Hematological: Negative for adenopathy.   Psychiatric/Behavioral: Positive for agitation, decreased concentration, dysphoric mood and sleep disturbance. Negative for behavioral problems, self-injury and suicidal ideas. The patient is nervous/anxious.        Objective   Physical Exam   Constitutional: She is oriented to person, place, and time. She appears well-developed and well-nourished.  Non-toxic appearance. No distress.   HENT:   Head: Normocephalic and atraumatic. Hair is normal.   Right Ear: No drainage, swelling or tenderness. Tympanic membrane is retracted.   Left Ear: No drainage, swelling or tenderness. Tympanic membrane is retracted.   Nose: Mucosal edema present. No epistaxis.   Mouth/Throat: Uvula is midline and mucous membranes are normal. No oral lesions. No uvula swelling. Posterior oropharyngeal erythema present. No oropharyngeal exudate.   Eyes: Conjunctivae and EOM are normal. Pupils are equal, round, and reactive to light. Right eye exhibits no discharge. Left eye exhibits no discharge. No scleral icterus.   Neck: Normal range of motion. Neck supple. No thyromegaly present.   Cardiovascular: Normal rate, regular rhythm and normal heart sounds. Exam reveals no gallop.   No murmur heard.  Pulmonary/Chest: Effort normal and breath sounds normal. No stridor. No respiratory distress. She has no wheezes. She has no rales. She exhibits no tenderness.   Abdominal: Soft. There is no tenderness.   Musculoskeletal: Normal range of motion. She exhibits tenderness. She exhibits no deformity.   Tight and tender right posterior trapezius; neck ROM pain; no pain with right arm ROM;   Lumbar spine sore inside; not to palp; ROM pain right lumbar. Motor and sensory intact all. SLR 90*.  Sore to palp greater trochanter's bilat; Hip ROM  pain.  Will image hips to see if part of back pain.  NO Xray today and do Monday    Lymphadenopathy:     She has no cervical adenopathy.   Neurological: She is alert and oriented to person, place, and time. She displays normal reflexes. No cranial nerve deficit. She exhibits normal muscle tone. Coordination normal.   Skin: Skin is warm and dry. No rash noted. She is not diaphoretic.   Psychiatric: She has a normal mood and affect. Her behavior is normal. Judgment and thought content normal.   Nursing note and vitals reviewed.      Assessment/Plan   Adry was seen today for balance issues and neck pain.    Diagnoses and all orders for this visit:    Acute right-sided low back pain without sciatica  -     XR Hips Bilateral With or Without Pelvis 2 View; Future  -     XR Spine Lumbar 4+ View; Future  -     Ambulatory Referral to Physical Therapy Evaluate and treat  -     XR Spine Cervical Complete 4 or 5 View; Future    Migraine with aura and without status migrainosus, not intractable  -     XR Hips Bilateral With or Without Pelvis 2 View; Future  -     XR Spine Lumbar 4+ View; Future    Neck pain  -     XR Hips Bilateral With or Without Pelvis 2 View; Future  -     XR Spine Lumbar 4+ View; Future  -     Ambulatory Referral to Physical Therapy Evaluate and treat    Anxiety    Laboratory examination ordered as part of a routine general medical examination  -     Comprehensive metabolic panel  -     Lipid panel  -     CBC and Differential  -     TSH  -     T3, Free  -     T4, Free  -     Vitamin B12  -     Folate  -     Vitamin D 25 Hydroxy  -     XR Hips Bilateral With or Without Pelvis 2 View; Future  -     XR Spine Lumbar 4+ View; Future    Other orders  -     busPIRone (BUSPAR) 10 MG tablet; Take 1 tablet by mouth 2 (Two) Times a Day. PRN anxiety  -     tiZANidine (ZANAFLEX) 4 MG tablet; 1/2-1 tab PO Q 8 hours PRN muscle spasms  -     desvenlafaxine (PRISTIQ) 50 MG 24 hr tablet; Take 1 tablet by mouth Daily. For  stress  -     naproxen (NAPROSYN) 500 MG tablet; Take 1 tablet by mouth 2 (Two) Times a Day With Meals. For pain; stop if GI upset  -     lidocaine (LIDODERM) 5 %; Apply up to 3 patches 12 hours on then 12 hours off then repeat PRN pain

## 2019-01-26 LAB
25(OH)D3+25(OH)D2 SERPL-MCNC: 21.8 NG/ML (ref 30–100)
ALBUMIN SERPL-MCNC: 4.6 G/DL (ref 3.5–5.2)
ALBUMIN/GLOB SERPL: 1.7 G/DL
ALP SERPL-CCNC: 64 U/L (ref 39–117)
ALT SERPL-CCNC: 28 U/L (ref 1–33)
AST SERPL-CCNC: 15 U/L (ref 1–32)
BASOPHILS # BLD AUTO: 0.04 10*3/MM3 (ref 0–0.2)
BASOPHILS NFR BLD AUTO: 0.6 % (ref 0–1.5)
BILIRUB SERPL-MCNC: 0.4 MG/DL (ref 0.1–1.2)
BUN SERPL-MCNC: 15 MG/DL (ref 6–20)
BUN/CREAT SERPL: 14.9 (ref 7–25)
CALCIUM SERPL-MCNC: 9.6 MG/DL (ref 8.6–10.5)
CHLORIDE SERPL-SCNC: 107 MMOL/L (ref 98–107)
CHOLEST SERPL-MCNC: 128 MG/DL (ref 0–200)
CO2 SERPL-SCNC: 23.1 MMOL/L (ref 22–29)
CREAT SERPL-MCNC: 1.01 MG/DL (ref 0.57–1)
EOSINOPHIL # BLD AUTO: 0.11 10*3/MM3 (ref 0–0.7)
EOSINOPHIL NFR BLD AUTO: 1.7 % (ref 0.3–6.2)
ERYTHROCYTE [DISTWIDTH] IN BLOOD BY AUTOMATED COUNT: 13 % (ref 11.7–13)
FOLATE SERPL-MCNC: 12.02 NG/ML (ref 4.78–24.2)
GLOBULIN SER CALC-MCNC: 2.7 GM/DL
GLUCOSE SERPL-MCNC: 69 MG/DL (ref 65–99)
HCT VFR BLD AUTO: 43.9 % (ref 35.6–45.5)
HDLC SERPL-MCNC: 39 MG/DL (ref 40–60)
HGB BLD-MCNC: 14.4 G/DL (ref 11.9–15.5)
IMM GRANULOCYTES # BLD AUTO: 0 10*3/MM3 (ref 0–0.03)
IMM GRANULOCYTES NFR BLD AUTO: 0 % (ref 0–0.5)
LDLC SERPL CALC-MCNC: 78 MG/DL (ref 0–100)
LYMPHOCYTES # BLD AUTO: 2.04 10*3/MM3 (ref 0.9–4.8)
LYMPHOCYTES NFR BLD AUTO: 31.8 % (ref 19.6–45.3)
MCH RBC QN AUTO: 31 PG (ref 26.9–32)
MCHC RBC AUTO-ENTMCNC: 32.8 G/DL (ref 32.4–36.3)
MCV RBC AUTO: 94.4 FL (ref 80.5–98.2)
MONOCYTES # BLD AUTO: 0.36 10*3/MM3 (ref 0.2–1.2)
MONOCYTES NFR BLD AUTO: 5.6 % (ref 5–12)
NEUTROPHILS # BLD AUTO: 3.87 10*3/MM3 (ref 1.9–8.1)
NEUTROPHILS NFR BLD AUTO: 60.3 % (ref 42.7–76)
PLATELET # BLD AUTO: 238 10*3/MM3 (ref 140–500)
POTASSIUM SERPL-SCNC: 4.1 MMOL/L (ref 3.5–5.2)
PROT SERPL-MCNC: 7.3 G/DL (ref 6–8.5)
RBC # BLD AUTO: 4.65 10*6/MM3 (ref 3.9–5.2)
SODIUM SERPL-SCNC: 143 MMOL/L (ref 136–145)
T3FREE SERPL-MCNC: 2.7 PG/ML (ref 2–4.4)
T4 FREE SERPL-MCNC: 1.46 NG/DL (ref 0.93–1.7)
TRIGL SERPL-MCNC: 56 MG/DL (ref 0–150)
TSH SERPL DL<=0.005 MIU/L-ACNC: 2.37 MIU/ML (ref 0.27–4.2)
VIT B12 SERPL-MCNC: 483 PG/ML (ref 211–946)
VLDLC SERPL CALC-MCNC: 11.2 MG/DL (ref 5–40)
WBC # BLD AUTO: 6.42 10*3/MM3 (ref 4.5–10.7)

## 2019-03-05 RX ORDER — DESVENLAFAXINE 25 MG/1
25 TABLET, EXTENDED RELEASE ORAL DAILY
Qty: 30 TABLET | Refills: 5 | Status: SHIPPED | OUTPATIENT
Start: 2019-03-05 | End: 2019-05-31

## 2019-05-31 ENCOUNTER — OFFICE VISIT (OUTPATIENT)
Dept: FAMILY MEDICINE CLINIC | Facility: CLINIC | Age: 34
End: 2019-05-31

## 2019-05-31 VITALS
HEART RATE: 69 BPM | HEIGHT: 63 IN | WEIGHT: 174 LBS | OXYGEN SATURATION: 98 % | SYSTOLIC BLOOD PRESSURE: 110 MMHG | TEMPERATURE: 97.9 F | DIASTOLIC BLOOD PRESSURE: 72 MMHG | RESPIRATION RATE: 16 BRPM | BODY MASS INDEX: 30.83 KG/M2

## 2019-05-31 DIAGNOSIS — K59.09 CHRONIC CONSTIPATION: ICD-10-CM

## 2019-05-31 DIAGNOSIS — K21.9 GASTROESOPHAGEAL REFLUX DISEASE WITHOUT ESOPHAGITIS: ICD-10-CM

## 2019-05-31 DIAGNOSIS — M54.2 NECK PAIN: ICD-10-CM

## 2019-05-31 DIAGNOSIS — M25.50 MULTIPLE JOINT PAIN: ICD-10-CM

## 2019-05-31 DIAGNOSIS — G43.119 INTRACTABLE MIGRAINE WITH AURA WITHOUT STATUS MIGRAINOSUS: Primary | ICD-10-CM

## 2019-05-31 DIAGNOSIS — Z82.61 FAMILY HISTORY OF RHEUMATOID ARTHRITIS: ICD-10-CM

## 2019-05-31 DIAGNOSIS — M25.40 JOINT SWELLING: ICD-10-CM

## 2019-05-31 DIAGNOSIS — F41.9 ANXIETY: ICD-10-CM

## 2019-05-31 PROCEDURE — 99214 OFFICE O/P EST MOD 30 MIN: CPT | Performed by: PHYSICIAN ASSISTANT

## 2019-05-31 RX ORDER — BUSPIRONE HYDROCHLORIDE 10 MG/1
10 TABLET ORAL 2 TIMES DAILY
Qty: 60 TABLET | Refills: 2 | Status: SHIPPED | OUTPATIENT
Start: 2019-05-31 | End: 2020-01-06

## 2019-05-31 RX ORDER — PANTOPRAZOLE SODIUM 40 MG/1
40 TABLET, DELAYED RELEASE ORAL DAILY
Qty: 30 TABLET | Refills: 11 | Status: SHIPPED | OUTPATIENT
Start: 2019-05-31 | End: 2020-03-05

## 2019-05-31 RX ORDER — LUBIPROSTONE 24 UG/1
24 CAPSULE ORAL 2 TIMES DAILY WITH MEALS
Qty: 60 CAPSULE | Refills: 5 | Status: SHIPPED | OUTPATIENT
Start: 2019-05-31 | End: 2019-06-28 | Stop reason: SDUPTHER

## 2019-05-31 RX ORDER — DULOXETIN HYDROCHLORIDE 30 MG/1
CAPSULE, DELAYED RELEASE ORAL
Qty: 90 CAPSULE | Refills: 3 | Status: SHIPPED | OUTPATIENT
Start: 2019-05-31 | End: 2019-06-28

## 2019-05-31 RX ORDER — BUPROPION HYDROCHLORIDE 300 MG/1
300 TABLET ORAL EVERY MORNING
Qty: 30 TABLET | Refills: 5 | Status: SHIPPED | OUTPATIENT
Start: 2019-05-31 | End: 2020-01-06 | Stop reason: SDUPTHER

## 2019-05-31 NOTE — PATIENT INSTRUCTIONS
Low glycemic index diet  Exercise 30 minutes most days of the week  Make sure you get results on any labs or tests we ordered today  We discussed medications and how to take them as prescribed  Sleep 6-8 hours each night if possible  If you have not signed up for [employer], please activate your code ASAP from your After Visit Summary today    LDL goal <100  LDL goal if heart disease <70  HDL goal >60  Triglyceride goal <150  BP goal =<130/80  Fasting glucose <100    Contact office if your depression worsens   Go to ER if start to develop feelings for suicide  Take medication as prescribed  If you are having trouble tolerating your medication, contact office before abruptly stopping it    Lubiprostone oral capsule  What is this medicine?  LUBIPROSTONE (loo bi PROS tone) is a laxative. It is used to treat chronic constipation and constipation caused by opioids (certain prescription pain medicines). It is also used to treat adult women with irritable bowel syndrome who have constipation.  This medicine may be used for other purposes; ask your health care provider or pharmacist if you have questions.  COMMON BRAND NAME(S): Cammymelissagutierrez  What should I tell my health care provider before I take this medicine?  They need to know if you have any of these conditions:  -cancer or tumor in abdomen, intestine, or stomach  -history of bowel obstruction or adhesions  -history of stool (fecal) impaction  -liver disease  -an unusual or allergic reaction to lubiprostone, other medicines, foods, dyes, or preservatives  -pregnant or trying to get pregnant  -breast-feeding  How should I use this medicine?  Take this medicine by mouth with a glass of water. Follow the directions on the prescription label. Do not cut, crush or chew this medicine. Take this medicine with food. Take your medicine at regular intervals. Do not take your medicine more often than directed. Do not stop taking except on your doctor's advice.  Talk to your pediatrician  regarding the use of this medicine in children. Special care may be needed.  Overdosage: If you think you have taken too much of this medicine contact a poison control center or emergency room at once.  NOTE: This medicine is only for you. Do not share this medicine with others.  What if I miss a dose?  If you miss a dose, take it as soon as you can. If it is almost time for your next dose, take only that dose. Do not take double or extra doses.  What may interact with this medicine?  -medicines that treat diarrhea  -methadone  -other medicines for constipation  This list may not describe all possible interactions. Give your health care provider a list of all the medicines, herbs, non-prescription drugs, or dietary supplements you use. Also tell them if you smoke, drink alcohol, or use illegal drugs. Some items may interact with your medicine.  What should I watch for while using this medicine?  Visit your doctor for regular check ups. Tell your doctor if your symptoms do not get better or if they get worse.  What side effects may I notice from receiving this medicine?  Side effects that you should report to your doctor or health care professional as soon as possible:  -allergic reactions like skin rash, itching or hives, swelling of the face, lips, or tongue  -feeling faint or lightheaded, falls  -new or worsening stomach pain  -severe or prolonged diarrhea  -vomiting  Side effects that usually do not require medical attention (report to your doctor or health care professional if they continue or are bothersome):  -headache  -loose stools  -nausea  This list may not describe all possible side effects. Call your doctor for medical advice about side effects. You may report side effects to FDA at 8-665-FDA-5901.  Where should I keep my medicine?  Keep out of the reach of children.  Store at room temperature between 15 and 30 degrees C (59 and 86 degrees F). Throw away any unused medicine after the expiration  date.  NOTE: This sheet is a summary. It may not cover all possible information. If you have questions about this medicine, talk to your doctor, pharmacist, or health care provider.  © 2019 [employer] (2017-01-19 11:04:22)

## 2019-05-31 NOTE — PROGRESS NOTES
Subjective   Adry Knight is a 33 y.o. female.     History of Present Illness   Adry Knight 33 y.o. female who presents today for routine follow up check and medication refills.  she has a history of   Patient Active Problem List   Diagnosis   • Intractable migraine with aura without status migrainosus   • Headache, migraine   • Burning or prickling sensation   • Anxiety   • Depression   • Low back pain without sciatica   • Neck pain   • Pain of left scapula   • Biliary dyskinesia   • Chronic constipation   • Gastroesophageal reflux disease without esophagitis   .  Since the last visit, she has overall felt tired.  She has GERD and is well controlled on PPI medication, Vitamin D deficiency and will update labs to confirm level is at goal >30, Migraine headaches and responding well with their PRN triptan and Migraines are helped by Topamax.  she has been compliant with current medications have reviewed them.  The patient denies medication side effects.  Long term use of NSAIDs can lead to heart disease and strokes, as well as GI bleeding/ulcers, and cause kidney disease. Advise labs to monitor renal functions to be done at least every 6 months.  Results for orders placed or performed in visit on 01/25/19   Comprehensive metabolic panel   Result Value Ref Range    Glucose 69 65 - 99 mg/dL    BUN 15 6 - 20 mg/dL    Creatinine 1.01 (H) 0.57 - 1.00 mg/dL    eGFR Non African Am 64 >60 mL/min/1.73    eGFR African Am 77 >60 mL/min/1.73    BUN/Creatinine Ratio 14.9 7.0 - 25.0    Sodium 143 136 - 145 mmol/L    Potassium 4.1 3.5 - 5.2 mmol/L    Chloride 107 98 - 107 mmol/L    Total CO2 23.1 22.0 - 29.0 mmol/L    Calcium 9.6 8.6 - 10.5 mg/dL    Total Protein 7.3 6.0 - 8.5 g/dL    Albumin 4.60 3.50 - 5.20 g/dL    Globulin 2.7 gm/dL    A/G Ratio 1.7 g/dL    Total Bilirubin 0.4 0.1 - 1.2 mg/dL    Alkaline Phosphatase 64 39 - 117 U/L    AST (SGOT) 15 1 - 32 U/L    ALT (SGPT) 28 1 - 33 U/L   Lipid panel   Result Value  Ref Range    Total Cholesterol 128 0 - 200 mg/dL    Triglycerides 56 0 - 150 mg/dL    HDL Cholesterol 39 (L) 40 - 60 mg/dL    VLDL Cholesterol 11.2 5 - 40 mg/dL    LDL Cholesterol  78 0 - 100 mg/dL   TSH   Result Value Ref Range    TSH 2.370 0.270 - 4.200 mIU/mL   T3, Free   Result Value Ref Range    T3, Free 2.7 2.0 - 4.4 pg/mL   T4, Free   Result Value Ref Range    Free T4 1.46 0.93 - 1.70 ng/dL   Vitamin B12   Result Value Ref Range    Vitamin B-12 483 211 - 946 pg/mL   Folate   Result Value Ref Range    Folate 12.02 4.78 - 24.20 ng/mL   Vitamin D 25 Hydroxy   Result Value Ref Range    25 Hydroxy, Vitamin D 21.8 (L) 30.0 - 100.0 ng/ml   CBC and Differential   Result Value Ref Range    WBC 6.42 4.50 - 10.70 10*3/mm3    RBC 4.65 3.90 - 5.20 10*6/mm3    Hemoglobin 14.4 11.9 - 15.5 g/dL    Hematocrit 43.9 35.6 - 45.5 %    MCV 94.4 80.5 - 98.2 fL    MCH 31.0 26.9 - 32.0 pg    MCHC 32.8 32.4 - 36.3 g/dL    RDW 13.0 11.7 - 13.0 %    Platelets 238 140 - 500 10*3/mm3    Neutrophil Rel % 60.3 42.7 - 76.0 %    Lymphocyte Rel % 31.8 19.6 - 45.3 %    Monocyte Rel % 5.6 5.0 - 12.0 %    Eosinophil Rel % 1.7 0.3 - 6.2 %    Basophil Rel % 0.6 0.0 - 1.5 %    Neutrophils Absolute 3.87 1.90 - 8.10 10*3/mm3    Lymphocytes Absolute 2.04 0.90 - 4.80 10*3/mm3    Monocytes Absolute 0.36 0.20 - 1.20 10*3/mm3    Eosinophils Absolute 0.11 0.00 - 0.70 10*3/mm3    Basophils Absolute 0.04 0.00 - 0.20 10*3/mm3    Immature Granulocyte Rel % 0.0 0.0 - 0.5 %    Immature Grans Absolute 0.00 0.00 - 0.03 10*3/mm3     RA with mom and GM---she is having daily joint pain in fingers---edema and pain; not as stiff  She needs labs for RA    Can consider Amitiza if vegetarian type gel cap  Having chronic constipation---cannot take Linzess;  Gel cap with beef in it--allergy---bloats  Still having neck pain and can get N/T right arm;  Need her to see if can restart Cymbalta--? Beef allergy?  It did help in past; nausea with Pristiq  Also uses Lidoderm patch;  consider MRI C spine next    Adry F Prophater female 33 y.o. who presents today for follow up of Depression and Anxiety.  She reports depression is helped by Wellbutrin some; still has and anxiety---more neck pain;  Will see SSNI will help neck and fingers. Onset of symptoms was approximately several years ago.  She denies current suicidal and homicidal ideation. Risk factors are family history of anxiety and or depression and lifestyle of multiple roles.  Previous treatment includes current Rx.  She complains of the following medication side effects: sexual dysfunction. On other meds--SSRIs  The patient has previously been in counseling..    Pristiq did work well---had nausea and tapered off    The following portions of the patient's history were reviewed and updated as appropriate: allergies, current medications, past family history, past medical history, past social history, past surgical history and problem list.    Review of Systems   Constitutional: Positive for fatigue. Negative for activity change, appetite change and unexpected weight change.   HENT: Negative for nosebleeds and trouble swallowing.    Eyes: Negative for pain and visual disturbance.   Respiratory: Negative for chest tightness, shortness of breath and wheezing.    Cardiovascular: Negative for chest pain and palpitations.   Gastrointestinal: Positive for abdominal distention and constipation. Negative for abdominal pain and blood in stool.   Endocrine: Negative.    Genitourinary: Negative for difficulty urinating and hematuria.   Musculoskeletal: Positive for arthralgias, joint swelling, myalgias, neck pain and neck stiffness.   Skin: Negative for color change and rash.   Allergic/Immunologic: Negative.    Neurological: Negative for syncope and speech difficulty.   Hematological: Negative for adenopathy.   Psychiatric/Behavioral: Positive for dysphoric mood and sleep disturbance. Negative for agitation and confusion. The patient is  nervous/anxious.    All other systems reviewed and are negative.      Objective   Physical Exam   Constitutional: She is oriented to person, place, and time. She appears well-developed and well-nourished. No distress.   HENT:   Head: Normocephalic and atraumatic.   Eyes: Conjunctivae and EOM are normal. Pupils are equal, round, and reactive to light. Right eye exhibits no discharge. Left eye exhibits no discharge. No scleral icterus.   Neck: Normal range of motion. Neck supple. No tracheal deviation present. No thyromegaly present.   Cardiovascular: Normal rate, regular rhythm, normal heart sounds, intact distal pulses and normal pulses. Exam reveals no gallop.   No murmur heard.  Pulmonary/Chest: Effort normal and breath sounds normal. No respiratory distress. She has no wheezes. She has no rales.   Musculoskeletal: Normal range of motion.   Neurological: She is alert and oriented to person, place, and time. She exhibits normal muscle tone. Coordination normal.   Skin: Skin is warm. No rash noted. No erythema. No pallor.   Psychiatric: She has a normal mood and affect. Her behavior is normal. Judgment and thought content normal.   Nursing note and vitals reviewed.      Assessment/Plan   Problems Addressed this Visit        Cardiovascular and Mediastinum    Intractable migraine with aura without status migrainosus - Primary    Relevant Medications    DULoxetine (CYMBALTA) 30 MG capsule    buPROPion XL (WELLBUTRIN XL) 300 MG 24 hr tablet    Other Relevant Orders    Basic Metabolic Panel    [facility]    C-reactive Protein    Rheumatoid Factor       Digestive    Chronic constipation    Relevant Orders    Basic Metabolic Panel    [facility]    C-reactive Protein    Rheumatoid Factor    Gastroesophageal reflux disease without esophagitis    Relevant Medications    pantoprazole (PROTONIX) 40 MG EC tablet    Other Relevant Orders    Basic Metabolic Panel    [facility]    C-reactive Protein    Rheumatoid Factor       Nervous and Auditory     Neck pain    Relevant Orders    Basic Metabolic Panel    [facility]    C-reactive Protein    Rheumatoid Factor       Other    Anxiety    Relevant Medications    buPROPion XL (WELLBUTRIN XL) 300 MG 24 hr tablet    Other Relevant Orders    Basic Metabolic Panel    [facility]    C-reactive Protein    Rheumatoid Factor      Other Visit Diagnoses     Multiple joint pain        Relevant Orders    Basic Metabolic Panel    [facility]OL[facility]    C-reactive Protein    Rheumatoid Factor    Joint swelling        Relevant Orders    Basic Metabolic Panel    ROLAND    C-reactive Protein    Rheumatoid Factor    Family history of rheumatoid arthritis        Relevant Orders    Basic Metabolic Panel    [facility]    C-reactive Protein    Rheumatoid Factor

## 2019-06-20 LAB
ANA SER QL: NEGATIVE
BUN SERPL-MCNC: 19 MG/DL (ref 6–20)
BUN/CREAT SERPL: 20 (ref 7–25)
CALCIUM SERPL-MCNC: 9.4 MG/DL (ref 8.6–10.5)
CHLORIDE SERPL-SCNC: 108 MMOL/L (ref 98–107)
CO2 SERPL-SCNC: 22.7 MMOL/L (ref 22–29)
CREAT SERPL-MCNC: 0.95 MG/DL (ref 0.57–1)
CRP SERPL-MCNC: 0.04 MG/DL (ref 0–0.5)
GLUCOSE SERPL-MCNC: 85 MG/DL (ref 65–99)
POTASSIUM SERPL-SCNC: 4.5 MMOL/L (ref 3.5–5.2)
RHEUMATOID FACT SERPL-ACNC: <10 IU/ML (ref 0–13.9)
SODIUM SERPL-SCNC: 141 MMOL/L (ref 136–145)

## 2019-06-28 ENCOUNTER — OFFICE VISIT (OUTPATIENT)
Dept: FAMILY MEDICINE CLINIC | Facility: CLINIC | Age: 34
End: 2019-06-28

## 2019-06-28 VITALS
RESPIRATION RATE: 16 BRPM | HEART RATE: 85 BPM | DIASTOLIC BLOOD PRESSURE: 70 MMHG | HEIGHT: 63 IN | SYSTOLIC BLOOD PRESSURE: 100 MMHG | BODY MASS INDEX: 32.43 KG/M2 | WEIGHT: 183 LBS | OXYGEN SATURATION: 99 %

## 2019-06-28 DIAGNOSIS — K59.09 CHRONIC CONSTIPATION: ICD-10-CM

## 2019-06-28 DIAGNOSIS — N94.819 VULVODYNIA: ICD-10-CM

## 2019-06-28 DIAGNOSIS — R30.9 PAINFUL URINATION: Primary | ICD-10-CM

## 2019-06-28 LAB
BILIRUB BLD-MCNC: NEGATIVE MG/DL
CLARITY, POC: CLEAR
COLOR UR: YELLOW
GLUCOSE UR STRIP-MCNC: NEGATIVE MG/DL
KETONES UR QL: NEGATIVE
LEUKOCYTE EST, POC: NEGATIVE
NITRITE UR-MCNC: NEGATIVE MG/ML
PH UR: 6 [PH] (ref 5–8)
PROT UR STRIP-MCNC: NEGATIVE MG/DL
RBC # UR STRIP: NEGATIVE /UL
SP GR UR: 1.02 (ref 1–1.03)
UROBILINOGEN UR QL: NORMAL

## 2019-06-28 PROCEDURE — 99214 OFFICE O/P EST MOD 30 MIN: CPT | Performed by: NURSE PRACTITIONER

## 2019-06-28 PROCEDURE — 81003 URINALYSIS AUTO W/O SCOPE: CPT | Performed by: NURSE PRACTITIONER

## 2019-06-28 RX ORDER — LUBIPROSTONE 24 UG/1
24 CAPSULE ORAL 2 TIMES DAILY WITH MEALS
Qty: 60 CAPSULE | Refills: 5 | Status: SHIPPED | OUTPATIENT
Start: 2019-06-28 | End: 2019-07-22

## 2019-06-28 RX ORDER — AMITRIPTYLINE HYDROCHLORIDE 25 MG/1
25 TABLET, FILM COATED ORAL NIGHTLY
Qty: 30 TABLET | Refills: 0 | Status: SHIPPED | OUTPATIENT
Start: 2019-06-28 | End: 2019-06-28 | Stop reason: SDUPTHER

## 2019-06-28 RX ORDER — AMITRIPTYLINE HYDROCHLORIDE 25 MG/1
25 TABLET, FILM COATED ORAL NIGHTLY
Qty: 90 TABLET | Refills: 0 | Status: SHIPPED | OUTPATIENT
Start: 2019-06-28 | End: 2019-07-22 | Stop reason: SDUPTHER

## 2019-06-28 NOTE — PROGRESS NOTES
Subjective   Adry Knight is a 33 y.o. female.     History of Present Illness   Adry Knight 33 y.o.female complains of urinary symptoms. she complains of dysuria, urgency and frequency. She has had symptoms for a few days. The symptoms are moderate.  Patient denies fever, flank pain, gross blood in urine and nausea.  Patient has tried  nothing for relief of symptoms.  Patient does have a history of recurrent UTI. She also has hx of chronic constipation. She reports about 2 BMs per week on average but has not had one yet this week.  She is not sure if this is contributing to her urinary symptoms.  She has tried Linzess in the past which did not help and she has since developed allergy to component of medication.  She was tried on Amitiza but insurance denied this.    She also has hx of vulvodynia for which she has taken Cymbalta in the past.  States this helped her pain and migraines but caused decreased libido so she stopped taking.  She is interested in other options.        The following portions of the patient's history were reviewed and updated as appropriate: allergies, current medications, past family history, past medical history, past social history, past surgical history and problem list.    Review of Systems   Constitutional: Negative for chills and fever.   Gastrointestinal: Positive for constipation. Negative for abdominal distention, diarrhea, nausea and vomiting.   Genitourinary: Positive for dysuria, frequency, urgency and vaginal pain. Negative for decreased urine volume, difficulty urinating, flank pain, hematuria, vaginal bleeding and vaginal discharge.   Neurological: Positive for headaches. Negative for dizziness.       Objective   Physical Exam   Constitutional: She is oriented to person, place, and time. She appears well-developed and well-nourished.   Pulmonary/Chest: Effort normal.   Abdominal: There is no CVA tenderness.   Neurological: She is alert and oriented to person,  place, and time.   Psychiatric: She has a normal mood and affect. Judgment normal.   Nursing note and vitals reviewed.      Assessment/Plan   Adry was seen today for difficulty urinating and migraine.    Diagnoses and all orders for this visit:    Painful urination  -     POCT urinalysis dipstick, automated  -     Urine Culture - Urine, Urine, Clean Catch    Vulvodynia  -     amitriptyline (ELAVIL) 25 MG tablet; Take 1 tablet by mouth Every Night.    Chronic constipation  -     lubiprostone (AMITIZA) 24 MCG capsule; Take 1 capsule by mouth 2 (Two) Times a Day With Meals. For constipation        Prescribed amitriptyline for migraine prophylaxis and to see if this helps with her vulvodynia.    Urine culture ordered.   Gave samples of Amitiza and resent RX with notice of Linzess therapy failure. PA requested.

## 2019-06-30 LAB
BACTERIA UR CULT: NORMAL
BACTERIA UR CULT: NORMAL

## 2019-07-21 DIAGNOSIS — R30.9 PAINFUL URINATION: Primary | ICD-10-CM

## 2019-07-22 ENCOUNTER — OFFICE VISIT (OUTPATIENT)
Dept: FAMILY MEDICINE CLINIC | Facility: CLINIC | Age: 34
End: 2019-07-22

## 2019-07-22 VITALS
HEART RATE: 76 BPM | HEIGHT: 63 IN | WEIGHT: 179 LBS | RESPIRATION RATE: 16 BRPM | OXYGEN SATURATION: 98 % | SYSTOLIC BLOOD PRESSURE: 110 MMHG | BODY MASS INDEX: 31.71 KG/M2 | DIASTOLIC BLOOD PRESSURE: 74 MMHG | TEMPERATURE: 97.9 F

## 2019-07-22 DIAGNOSIS — N30.10 INTERSTITIAL CYSTITIS: ICD-10-CM

## 2019-07-22 DIAGNOSIS — G43.119 INTRACTABLE MIGRAINE WITH AURA WITHOUT STATUS MIGRAINOSUS: ICD-10-CM

## 2019-07-22 DIAGNOSIS — R30.0 BURNING WITH URINATION: Primary | ICD-10-CM

## 2019-07-22 LAB
BILIRUB BLD-MCNC: ABNORMAL MG/DL
CLARITY, POC: CLEAR
COLOR UR: ABNORMAL
GLUCOSE UR STRIP-MCNC: ABNORMAL MG/DL
KETONES UR QL: ABNORMAL
LEUKOCYTE EST, POC: NEGATIVE
NITRITE UR-MCNC: POSITIVE MG/ML
PH UR: 5 [PH] (ref 5–8)
PROT UR STRIP-MCNC: ABNORMAL MG/DL
RBC # UR STRIP: NEGATIVE /UL
SP GR UR: 1.01 (ref 1–1.03)
UROBILINOGEN UR QL: NORMAL

## 2019-07-22 PROCEDURE — 81003 URINALYSIS AUTO W/O SCOPE: CPT | Performed by: PHYSICIAN ASSISTANT

## 2019-07-22 PROCEDURE — 99213 OFFICE O/P EST LOW 20 MIN: CPT | Performed by: PHYSICIAN ASSISTANT

## 2019-07-22 RX ORDER — TOPIRAMATE 100 MG/1
100 TABLET, FILM COATED ORAL DAILY
Qty: 90 TABLET | Refills: 3 | Status: SHIPPED | OUTPATIENT
Start: 2019-07-22 | End: 2020-03-05

## 2019-07-22 RX ORDER — LEVOFLOXACIN 250 MG/1
250 TABLET ORAL DAILY
Qty: 5 TABLET | Refills: 1 | Status: SHIPPED | OUTPATIENT
Start: 2019-07-22 | End: 2019-11-25

## 2019-07-22 RX ORDER — AMITRIPTYLINE HYDROCHLORIDE 25 MG/1
25 TABLET, FILM COATED ORAL NIGHTLY
Qty: 90 TABLET | Refills: 3 | Status: SHIPPED | OUTPATIENT
Start: 2019-07-22 | End: 2019-11-25

## 2019-07-22 NOTE — PROGRESS NOTES
Subjective   Adry Knight is a 33 y.o. female.     History of Present Illness   Adry Knight 33 y.o.female complains of urinary symptoms. she complains of dysuria, urgency, frequency, lower abdominal pain and some back pain.  I suspect IC but urine had + nitrates and will start Rx for now. She has had symptoms for several weeks---1 mo . The symptoms are moderate.  Patient denies fever, gross blood in urine and risk of STD.  Patient has tried  increasing fluids and OTC bladder analgesic for relief of symptoms.  Patient does have a history of recurrent UTI. Patient does not have a history of pyelonephritis.    Sometimes back pain  I am concerned about IC and see Livia  Info on IC  Can try daily Claritin also  Adry Knight female 33 y.o. who presents today for follow up of Depression and Anxiety.  She reports meds are working and takes Buspar PRN and this is fine--can take routine if want to.  Elavil is suppressing migraines---intol to higher dose Topamax.  Intol Pristiq---nausea.  Labs are current.. Onset of symptoms was approximately several years ago.  She denies current suicidal and homicidal ideation. Risk factors are family history of anxiety and or depression and lifestyle of multiple roles.  Previous treatment includes current Rx.  She complains of the following medication side effects: none.  The patient has previously been in counseling..    On Gluten free, dairy free diet.  Having daily BMs; saw dietician.      Need Rx without gelatin  Stop antibiotic if tendon pain develops.  This medication can cause tendon rupture, though rare.    The following portions of the patient's history were reviewed and updated as appropriate: allergies, current medications, past family history, past medical history, past social history, past surgical history and problem list.    Review of Systems   Constitutional: Negative for activity change, appetite change and unexpected weight change.   HENT: Negative  for nosebleeds and trouble swallowing.    Eyes: Negative for pain and visual disturbance.   Respiratory: Negative for chest tightness, shortness of breath and wheezing.    Cardiovascular: Negative for chest pain and palpitations.   Gastrointestinal: Negative for abdominal pain and blood in stool.   Endocrine: Negative.    Genitourinary: Positive for dysuria, frequency, pelvic pain and urgency. Negative for difficulty urinating and hematuria.   Musculoskeletal: Negative for joint swelling.   Skin: Negative for color change and rash.   Allergic/Immunologic: Negative.    Neurological: Negative for syncope and speech difficulty.   Hematological: Negative for adenopathy.   Psychiatric/Behavioral: Negative for agitation and confusion.   All other systems reviewed and are negative.      Objective   Physical Exam   Constitutional: She is oriented to person, place, and time. She appears well-developed and well-nourished. No distress.   HENT:   Head: Normocephalic and atraumatic.   Eyes: Conjunctivae and EOM are normal. Pupils are equal, round, and reactive to light. Right eye exhibits no discharge. Left eye exhibits no discharge. No scleral icterus.   Neck: Normal range of motion. Neck supple. No tracheal deviation present. No thyromegaly present.   Cardiovascular: Normal rate, regular rhythm, normal heart sounds, intact distal pulses and normal pulses. Exam reveals no gallop.   No murmur heard.  Pulmonary/Chest: Effort normal and breath sounds normal. No respiratory distress. She has no wheezes. She has no rales.   Musculoskeletal: Normal range of motion.   Neurological: She is alert and oriented to person, place, and time. She exhibits normal muscle tone. Coordination normal.   Skin: Skin is warm. No rash noted. No erythema. No pallor.   Psychiatric: She has a normal mood and affect. Her behavior is normal. Judgment and thought content normal.   Nursing note and vitals reviewed.      Assessment/Plan   Adry was seen  today for urinary tract infection.    Diagnoses and all orders for this visit:    Burning with urination  -     POC Urinalysis Dipstick, Automated      Treat for UTI--Levaquin but suspect IC; see Livia for work up  Keep Elavil for migraine suppression and working  Keep meds for anxiety and depression  Can see ortho for knees

## 2019-07-22 NOTE — PATIENT INSTRUCTIONS
Interstitial Cystitis  Interstitial cystitis is a condition that causes inflammation of the bladder. The bladder is a hollow organ in the lower part of your abdomen. It stores urine after the urine is made by your kidneys. With interstitial cystitis, you may have pain in the bladder area. You may also have a frequent and urgent need to urinate.  The severity of interstitial cystitis can vary from person to person. You may have flare-ups of the condition, and then it may go away for a while. For many people who have this condition, it becomes a long-term problem.  What are the causes?  The cause of this condition is not known.  What increases the risk?  This condition is more likely to develop in women.  What are the signs or symptoms?  Symptoms of interstitial cystitis vary, and they can change over time. Symptoms may include:  · Discomfort or pain in the bladder area. This can range from mild to severe. The pain may change in intensity as the bladder fills with urine or as it empties.  · Pelvic pain.  · An urgent need to urinate.  · Frequent urination.  · Pain during sexual intercourse.  · Pinpoint bleeding on the bladder wall.    For women, the symptoms often get worse during menstruation.  How is this diagnosed?  This condition is diagnosed by evaluating your symptoms and ruling out other causes. A physical exam will be done. Various tests may be done to rule out other conditions. Common tests include:  · Urine tests.  · Cystoscopy. In this test, a tool that is like a very thin telescope is used to look into your bladder.  · Biopsy. This involves taking a sample of tissue from the bladder wall to be examined under a microscope.    How is this treated?  There is no cure for interstitial cystitis, but treatment methods are available to control your symptoms. Work closely with your health care provider to find the treatments that will be most effective for you. Treatment options may include:  · Medicines to relieve  pain and to help reduce the number of times that you feel the need to urinate.  · Bladder training. This involves learning ways to control when you urinate, such as:  ? Urinating at scheduled times.  ? Training yourself to delay urination.  ? Doing exercises (Kegel exercises) to strengthen the muscles that control urine flow.  · Lifestyle changes, such as changing your diet or taking steps to control stress.  · Use of a device that provides electrical stimulation in order to reduce pain.  · A procedure that stretches your bladder by filling it with air or fluid.  · Surgery. This is rare. It is only done for extreme cases if other treatments do not help.    Follow these instructions at home:  · Take medicines only as directed by your health care provider.  · Use bladder training techniques as directed.  ? Keep a bladder diary to find out which foods, liquids, or activities make your symptoms worse.  ? Use your bladder diary to schedule bathroom trips. If you are away from home, plan to be near a bathroom at each of your scheduled times.  ? Make sure you urinate just before you leave the house and just before you go to bed.  · Do Kegel exercises as directed by your health care provider.  · Do not drink alcohol.  · Do not use any tobacco products, including cigarettes, chewing tobacco, or electronic cigarettes. If you need help quitting, ask your health care provider.  · Make dietary changes as directed by your health care provider. You may need to avoid spicy foods and foods that contain a high amount of potassium.  · Limit your drinking of beverages that stimulate urination. These include soda, coffee, and tea.  · Keep all follow-up visits as directed by your health care provider. This is important.  Contact a health care provider if:  · Your symptoms do not get better after treatment.  · Your pain and discomfort are getting worse.  · You have more frequent urges to urinate.  · You have a fever.  Get help right away  if:  · You are not able to control your bladder at all.  This information is not intended to replace advice given to you by your health care provider. Make sure you discuss any questions you have with your health care provider.  Document Released: 08/18/2005 Document Revised: 05/25/2017 Document Reviewed: 08/25/2015  Elsevier Interactive Patient Education © 2018 [employer]    See CHULA Herring---[phone] for IC

## 2019-07-24 LAB
APPEARANCE UR: CLEAR
BACTERIA #/AREA URNS HPF: ABNORMAL /[HPF]
BACTERIA UR CULT: NORMAL
BACTERIA UR CULT: NORMAL
COLOR UR: (no result)
CRYSTALS URNS MICRO: ABNORMAL
EPI CELLS #/AREA URNS HPF: ABNORMAL /HPF
GLUCOSE UR QL: (no result)
KETONES UR QL STRIP: (no result)
Lab: NORMAL
MICRO URNS: (no result)
MICRO URNS: (no result)
MUCOUS THREADS URNS QL MICRO: PRESENT
PH UR STRIP: (no result) [PH]
PROT UR QL STRIP: (no result)
RBC #/AREA URNS HPF: ABNORMAL /HPF
SP GR UR: 1.01 (ref 1–1.03)
UNIDENT CRYS URNS QL MICRO: PRESENT
WBC #/AREA URNS HPF: ABNORMAL /HPF

## 2019-10-23 ENCOUNTER — HOSPITAL ENCOUNTER (EMERGENCY)
Facility: HOSPITAL | Age: 34
Discharge: HOME | End: 2019-10-23
Attending: EMERGENCY MEDICINE | Admitting: EMERGENCY MEDICINE

## 2019-10-23 ENCOUNTER — APPOINTMENT (OUTPATIENT)
Dept: CT IMAGING | Facility: HOSPITAL | Age: 34
End: 2019-10-23

## 2019-10-23 VITALS
HEIGHT: 63 IN | OXYGEN SATURATION: 98 % | HEART RATE: 76 BPM | TEMPERATURE: 98.3 F | WEIGHT: 170 LBS | SYSTOLIC BLOOD PRESSURE: 124 MMHG | DIASTOLIC BLOOD PRESSURE: 89 MMHG | BODY MASS INDEX: 30.12 KG/M2 | RESPIRATION RATE: 15 BRPM

## 2019-10-23 DIAGNOSIS — R10.12 LEFT UPPER QUADRANT PAIN: Primary | ICD-10-CM

## 2019-10-23 DIAGNOSIS — K59.00 CONSTIPATION, UNSPECIFIED CONSTIPATION TYPE: ICD-10-CM

## 2019-10-23 LAB
ALBUMIN SERPL-MCNC: 4.2 G/DL (ref 3.5–5.2)
ALBUMIN/GLOB SERPL: 1.4 G/DL
ALP SERPL-CCNC: 58 U/L (ref 39–117)
ALT SERPL W P-5'-P-CCNC: 32 U/L (ref 1–33)
ANION GAP SERPL CALCULATED.3IONS-SCNC: 5.4 MMOL/L (ref 5–15)
AST SERPL-CCNC: 17 U/L (ref 1–32)
BASOPHILS # BLD AUTO: 0.05 10*3/MM3 (ref 0–0.2)
BASOPHILS NFR BLD AUTO: 0.8 % (ref 0–1.5)
BILIRUB SERPL-MCNC: 0.5 MG/DL (ref 0.2–1.2)
BILIRUB UR QL STRIP: NEGATIVE
BUN BLD-MCNC: 14 MG/DL (ref 6–20)
BUN/CREAT SERPL: 15.2 (ref 7–25)
CALCIUM SPEC-SCNC: 8.7 MG/DL (ref 8.6–10.5)
CHLORIDE SERPL-SCNC: 108 MMOL/L (ref 98–107)
CLARITY UR: CLEAR
CO2 SERPL-SCNC: 25.6 MMOL/L (ref 22–29)
COLOR UR: YELLOW
CREAT BLD-MCNC: 0.92 MG/DL (ref 0.57–1)
DEPRECATED RDW RBC AUTO: 42.6 FL (ref 37–54)
EOSINOPHIL # BLD AUTO: 0.04 10*3/MM3 (ref 0–0.4)
EOSINOPHIL NFR BLD AUTO: 0.6 % (ref 0.3–6.2)
ERYTHROCYTE [DISTWIDTH] IN BLOOD BY AUTOMATED COUNT: 12.6 % (ref 12.3–15.4)
GFR SERPL CREATININE-BSD FRML MDRD: 71 ML/MIN/1.73
GLOBULIN UR ELPH-MCNC: 2.9 GM/DL
GLUCOSE BLD-MCNC: 80 MG/DL (ref 65–99)
GLUCOSE UR STRIP-MCNC: NEGATIVE MG/DL
HCG SERPL QL: NEGATIVE
HCT VFR BLD AUTO: 41.6 % (ref 34–46.6)
HGB BLD-MCNC: 13.9 G/DL (ref 12–15.9)
HGB UR QL STRIP.AUTO: NEGATIVE
IMM GRANULOCYTES # BLD AUTO: 0.01 10*3/MM3 (ref 0–0.05)
IMM GRANULOCYTES NFR BLD AUTO: 0.2 % (ref 0–0.5)
KETONES UR QL STRIP: NEGATIVE
LEUKOCYTE ESTERASE UR QL STRIP.AUTO: NEGATIVE
LIPASE SERPL-CCNC: 16 U/L (ref 13–60)
LYMPHOCYTES # BLD AUTO: 2.02 10*3/MM3 (ref 0.7–3.1)
LYMPHOCYTES NFR BLD AUTO: 31.9 % (ref 19.6–45.3)
MCH RBC QN AUTO: 31 PG (ref 26.6–33)
MCHC RBC AUTO-ENTMCNC: 33.4 G/DL (ref 31.5–35.7)
MCV RBC AUTO: 92.7 FL (ref 79–97)
MONOCYTES # BLD AUTO: 0.38 10*3/MM3 (ref 0.1–0.9)
MONOCYTES NFR BLD AUTO: 6 % (ref 5–12)
NEUTROPHILS # BLD AUTO: 3.84 10*3/MM3 (ref 1.7–7)
NEUTROPHILS NFR BLD AUTO: 60.5 % (ref 42.7–76)
NITRITE UR QL STRIP: NEGATIVE
NRBC BLD AUTO-RTO: 0 /100 WBC (ref 0–0.2)
PH UR STRIP.AUTO: 6 [PH] (ref 5–8)
PLATELET # BLD AUTO: 213 10*3/MM3 (ref 140–450)
PMV BLD AUTO: 10.9 FL (ref 6–12)
POTASSIUM BLD-SCNC: 4.1 MMOL/L (ref 3.5–5.2)
PROT SERPL-MCNC: 7.1 G/DL (ref 6–8.5)
PROT UR QL STRIP: NEGATIVE
RBC # BLD AUTO: 4.49 10*6/MM3 (ref 3.77–5.28)
SODIUM BLD-SCNC: 139 MMOL/L (ref 136–145)
SP GR UR STRIP: 1.02 (ref 1–1.03)
UROBILINOGEN UR QL STRIP: NORMAL
WBC NRBC COR # BLD: 6.34 10*3/MM3 (ref 3.4–10.8)

## 2019-10-23 PROCEDURE — 80053 COMPREHEN METABOLIC PANEL: CPT | Performed by: EMERGENCY MEDICINE

## 2019-10-23 PROCEDURE — 96374 THER/PROPH/DIAG INJ IV PUSH: CPT

## 2019-10-23 PROCEDURE — 99283 EMERGENCY DEPT VISIT LOW MDM: CPT

## 2019-10-23 PROCEDURE — 83690 ASSAY OF LIPASE: CPT | Performed by: EMERGENCY MEDICINE

## 2019-10-23 PROCEDURE — 85025 COMPLETE CBC W/AUTO DIFF WBC: CPT | Performed by: EMERGENCY MEDICINE

## 2019-10-23 PROCEDURE — 74176 CT ABD & PELVIS W/O CONTRAST: CPT

## 2019-10-23 PROCEDURE — 81003 URINALYSIS AUTO W/O SCOPE: CPT | Performed by: EMERGENCY MEDICINE

## 2019-10-23 PROCEDURE — 84703 CHORIONIC GONADOTROPIN ASSAY: CPT | Performed by: EMERGENCY MEDICINE

## 2019-10-23 RX ORDER — FAMOTIDINE 10 MG/ML
20 INJECTION, SOLUTION INTRAVENOUS ONCE
Status: COMPLETED | OUTPATIENT
Start: 2019-10-23 | End: 2019-10-23

## 2019-10-23 RX ORDER — POLYETHYLENE GLYCOL 3350 17 G/17G
17 POWDER, FOR SOLUTION ORAL DAILY
COMMUNITY

## 2019-10-23 RX ADMIN — SODIUM CHLORIDE 500 ML: 9 INJECTION, SOLUTION INTRAVENOUS at 13:50

## 2019-10-23 RX ADMIN — FAMOTIDINE 20 MG: 10 INJECTION INTRAVENOUS at 13:49

## 2019-10-23 NOTE — ED PROVIDER NOTES
EMERGENCY DEPARTMENT ENCOUNTER    CHIEF COMPLAINT  Chief Complaint: upper abdominal pain  History given by: patient  History limited by: none  Room Number: 03/03  PMD: Cora Palmer PA-C      HPI:  Pt is a 34 y.o. female who presents complaining of upper abdominal pain and bilateral flank pain that started two weeks ago and has worsened over the last few days. Pt has noticed that her pain seems to be worse at night. Pt also c/o nausea. Pt denies difficulty urinating, dysuria, hematuria, vomiting, fever, and chills. Pt with hx of IBS that gives the pt chronic constipation. Pt with hx of cholecystectomy one year ago. Pt has been texting her PCP about her symptoms and was told to come into the ER for further evaluation. Pt is currently on her period.     Duration: two weeks  Onset: gradual  Timing: constant  Location: upper abdomen, bilateral flanks  Radiation: none  Quality: upper abdominal pain  Intensity/Severity: moderate  Progression: worsening  Associated Symptoms: nausea, chronic constipation  Aggravating Factors: pain is worse at night  Alleviating Factors: none  Previous Episodes: Pt with hx of IBS.   Treatment before arrival: none    PAST MEDICAL HISTORY  Active Ambulatory Problems     Diagnosis Date Noted   • Intractable migraine with aura without status migrainosus 11/11/2015   • Headache, migraine 11/11/2015   • Burning or prickling sensation 11/11/2015   • Anxiety 11/11/2015   • Depression 11/11/2015   • Low back pain without sciatica 01/04/2016   • Neck pain 01/04/2016   • Pain of left scapula 02/01/2016   • Biliary dyskinesia 10/23/2018   • Chronic constipation 05/31/2019   • Gastroesophageal reflux disease without esophagitis 05/31/2019     Resolved Ambulatory Problems     Diagnosis Date Noted   • No Resolved Ambulatory Problems     Past Medical History:   Diagnosis Date   • Anxiety    • Biliary dyskinesia    • Depression    • Dysphagia    • GERD (gastroesophageal reflux disease)    • Headache    •  Hernia    • Irritable bowel syndrome    • Lactose intolerance    • LFT elevation    • Migraines    • Shingles        PAST SURGICAL HISTORY  Past Surgical History:   Procedure Laterality Date   • CHOLECYSTECTOMY WITH INTRAOPERATIVE CHOLANGIOGRAM N/A 10/24/2018    Procedure: LAPAROSCOPIC CHOLECYSTECTOMY WITH INTRAOPERATIVE CHOLANGIOGRAM, POSSIBLE OPEN;  Surgeon: Zuleima Johansen MD;  Location: [facility] OR [facility] [place];  Service: General   • TONSILLECTOMY     • UPPER GASTROINTESTINAL ENDOSCOPY N/A 09/21/2015    Normal esophagus, Normal stomach, Normal duodenum, Dr. Marleni Alas   • VAGINAL DELIVERY N/A 10/08/2012    Dr. Brittney Wesley       FAMILY HISTORY  Family History   Problem Relation Age of Onset   • Depression Mother    • Irritable bowel syndrome Mother    • Alcohol abuse Mother    • Asthma Mother    • Cancer Sister    • Cancer Paternal Grandmother    • Celiac disease Sister    • Gallbladder disease Sister    • Irritable bowel syndrome Maternal Grandmother    • Alcohol abuse Maternal Grandmother    • Depression Maternal Grandmother    • Mental illness Maternal Grandmother    • Alcohol abuse Brother    • Asthma Brother    • Malig Hyperthermia Neg Hx        SOCIAL HISTORY  Social History     Socioeconomic History   • Marital status:      Spouse name: Not on file   • Number of children: Not on file   • Years of education: Not on file   • Highest education level: Not on file   Tobacco Use   • Smoking status: Never Smoker   • Smokeless tobacco: Never Used   Substance and Sexual Activity   • Alcohol use: No   • Drug use: No   • Sexual activity: Yes     Partners: Male     Birth control/protection: Condom       ALLERGIES  Chocolate; Beef-derived products; Contrast dye; Gelatin; and Rice allergy skin test    REVIEW OF SYSTEMS  Review of Systems   Constitutional: Negative for chills and fever.   HENT: Negative for sore throat.    Eyes: Negative.    Respiratory: Negative for cough and shortness of breath.    Cardiovascular:  Negative for chest pain.   Gastrointestinal: Positive for abdominal pain (upper), constipation (chronic) and nausea. Negative for diarrhea and vomiting.   Genitourinary: Positive for flank pain (bilateral). Negative for difficulty urinating, dysuria and hematuria.   Musculoskeletal: Negative for neck pain.   Skin: Negative for rash.   Allergic/Immunologic: Negative.    Neurological: Negative for weakness, numbness and headaches.   Hematological: Negative.    Psychiatric/Behavioral: Negative.    All other systems reviewed and are negative.      PHYSICAL EXAM  ED Triage Vitals   Temp Heart Rate Resp BP SpO2   10/23/19 1315 10/23/19 1315 10/23/19 1315 10/23/19 1324 10/23/19 1315   98.3 °F (36.8 °C) 90 18 128/82 99 %      Temp src Heart Rate Source Patient Position BP Location FiO2 (%)   10/23/19 1315 10/23/19 1315 -- -- --   Tympanic Monitor          Physical Exam   Constitutional: She is oriented to person, place, and time. No distress.   HENT:   Head: Normocephalic and atraumatic.   Moist mucous membranes.    Eyes: EOM are normal. Pupils are equal, round, and reactive to light.   Neck: Normal range of motion. Neck supple.   Cardiovascular: Normal rate, regular rhythm and normal heart sounds.   Pulmonary/Chest: Effort normal and breath sounds normal. No respiratory distress.   CTA bilaterally   Abdominal: Soft. There is no tenderness. There is no rebound and no guarding.   Hypoactive bowel sounds. There is mid epigastric and LUQ abdominal tenderness. No RUQ or lower abdominal tenderness. No flank tenderness.    Musculoskeletal: Normal range of motion. She exhibits no edema ( no pedal edema).        Right lower leg: She exhibits no tenderness.        Left lower leg: She exhibits no tenderness.   Neurological: She is alert and oriented to person, place, and time. She has normal sensation and normal strength.   Nl neuro exam   Skin: Skin is warm and dry. No rash noted.   Psychiatric: Mood and affect normal.   Nursing  note and vitals reviewed.      LAB RESULTS  Lab Results (last 24 hours)     Procedure Component Value Units Date/Time    Urinalysis With Microscopic If Indicated (No Culture) - Urine, Clean Catch [[id number]]  (Normal) Collected:  10/23/19 1343    Specimen:  Urine, Clean Catch Updated:  10/23/19 1357     Color, UA Yellow     Appearance, UA Clear     pH, UA 6.0     Specific Gravity, UA 1.023     Glucose, UA Negative     Ketones, UA Negative     Bilirubin, UA Negative     Blood, UA Negative     Protein, UA Negative     Leuk Esterase, UA Negative     Nitrite, UA Negative     Urobilinogen, UA 0.2 E.U./dL    Narrative:       Urine microscopic not indicated.    CBC & Differential [[id number]] Collected:  10/23/19 1349    Specimen:  Blood Updated:  10/23/19 1404    Narrative:       The following orders were created for panel order CBC & Differential.  Procedure                               Abnormality         Status                     ---------                               -----------         ------                     CBC Auto Differential[[id number]]        Normal              Final result                 Please view results for these tests on the individual orders.    Comprehensive Metabolic Panel [[id number]]  (Abnormal) Collected:  10/23/19 1349    Specimen:  Blood Updated:  10/23/19 1433     Glucose 80 mg/dL      BUN 14 mg/dL      Creatinine 0.92 mg/dL      Sodium 139 mmol/L      Potassium 4.1 mmol/L      Chloride 108 mmol/L      CO2 25.6 mmol/L      Calcium 8.7 mg/dL      Total Protein 7.1 g/dL      Albumin 4.20 g/dL      ALT (SGPT) 32 U/L      AST (SGOT) 17 U/L      Alkaline Phosphatase 58 U/L      Total Bilirubin 0.5 mg/dL      eGFR Non African Amer 71 mL/min/1.73      Globulin 2.9 gm/dL      A/G Ratio 1.4 g/dL      BUN/Creatinine Ratio 15.2     Anion Gap 5.4 mmol/L     Narrative:       GFR Normal >60  Chronic Kidney Disease <60  Kidney Failure <15    Lipase [[id number]]  (Normal) Collected:  10/23/19 1349     Specimen:  Blood Updated:  10/23/19 1433     Lipase 16 U/L     hCG, Serum, Qualitative [[id number]]  (Normal) Collected:  10/23/19 1349    Specimen:  Blood Updated:  10/23/19 1448     HCG Qualitative Negative    CBC Auto Differential [[id number]]  (Normal) Collected:  10/23/19 1349    Specimen:  Blood Updated:  10/23/19 1404     WBC 6.34 10*3/mm3      RBC 4.49 10*6/mm3      Hemoglobin 13.9 g/dL      Hematocrit 41.6 %      MCV 92.7 fL      MCH 31.0 pg      MCHC 33.4 g/dL      RDW 12.6 %      RDW-SD 42.6 fl      MPV 10.9 fL      Platelets 213 10*3/mm3      Neutrophil % 60.5 %      Lymphocyte % 31.9 %      Monocyte % 6.0 %      Eosinophil % 0.6 %      Basophil % 0.8 %      Immature Grans % 0.2 %      Neutrophils, Absolute 3.84 10*3/mm3      Lymphocytes, Absolute 2.02 10*3/mm3      Monocytes, Absolute 0.38 10*3/mm3      Eosinophils, Absolute 0.04 10*3/mm3      Basophils, Absolute 0.05 10*3/mm3      Immature Grans, Absolute 0.01 10*3/mm3      nRBC 0.0 /100 WBC           I ordered the above labs and reviewed the results    RADIOLOGY  CT Abdomen Pelvis Without Contrast    (Results Pending)      I discussed the CT of the abdomen pelvis with Dr. Maciel.  She states that the patient has constipation and some mild distention of her colon at the splenic flexure, however otherwise the CT scan of the abdomen pelvis is normal.    I ordered the above noted radiological studies. Interpreted by radiologist. Discussed with radiologist (Janell). Reviewed by me in PACS.       PROCEDURES  Procedures      PROGRESS AND CONSULTS       1336 Pepcid ordered.     1545 I discussed the lab results and CT scan with the patient and her .  She admits to having IBS with constipation and has been using MiraLAX without help.  I advised the patient to take magnesium citrate for acute problem and then to call Dr. Alas her GI specialist for follow-up.    MEDICAL DECISION MAKING  Results were reviewed/discussed with the patient and they were also made  aware of online access. Pt also made aware that some labs, such as cultures, will not be resulted during ER visit and follow up with PMD is necessary.     MDM  Number of Diagnoses or Management Options     Amount and/or Complexity of Data Reviewed  Clinical lab tests: ordered and reviewed (WBC - 6.34)  Tests in the radiology section of CPT®: reviewed and ordered  Independent visualization of images, tracings, or specimens: yes           DIAGNOSIS  Final diagnoses:   Left upper quadrant pain   Constipation, unspecified constipation type       DISPOSITION  Discharged    Latest Documented Vital Signs:  As of 4:30 PM  BP- 124/89 HR- 76 Temp- 98.3 °F (36.8 °C) (Tympanic) O2 sat- 98%    --  Documentation assistance provided by jaci Brown for Dr. Mckeon.  Information recorded by the scribe was done at my direction and has been verified and validated by me.            Miki Brown  10/23/19 8688       Gavin Mckeon MD  10/23/19 2368

## 2019-10-25 ENCOUNTER — TELEPHONE (OUTPATIENT)
Dept: GASTROENTEROLOGY | Facility: CLINIC | Age: 34
End: 2019-10-25

## 2019-10-25 NOTE — TELEPHONE ENCOUNTER
Regarding: Complaint  Contact: [phone]  ----- Message from Mychart, Generic sent at 10/25/2019  2:05 PM EDT -----    I went to the ER on wed with severe abdominal pain and I was told my CT scan shows that I am impacted and nearly my entire colon is full of stool. I was told to drink 2 bottles of mag citrate and make an appt with my GI Dr.. I drank both bottles with no success so my primary care told me to drink 225g of miralax with 64 oz of Gatorade which I have done.. I am only having pure liquid come out and still having pains so I am worried it's not breaking up. What should I do?

## 2019-10-25 NOTE — TELEPHONE ENCOUNTER
Call to pt.  Advise per Dr Alas that CT did not look worrisome - recommend holding off on additional bowel pre and starting miralax bid tomorrow.  Cramping may be from prep - liquid diet.  Hopefully will soon have spontaneous BM - if pain severe, return to ER.  Continue bid miralax once bowels start to move and f/u in office.    Verb understanding.  Has appt with [facility] Licona on 11/4.

## 2019-11-04 ENCOUNTER — OFFICE VISIT (OUTPATIENT)
Dept: GASTROENTEROLOGY | Facility: CLINIC | Age: 34
End: 2019-11-04

## 2019-11-04 VITALS
HEIGHT: 63 IN | DIASTOLIC BLOOD PRESSURE: 70 MMHG | SYSTOLIC BLOOD PRESSURE: 116 MMHG | WEIGHT: 177.8 LBS | TEMPERATURE: 98 F | BODY MASS INDEX: 31.5 KG/M2

## 2019-11-04 DIAGNOSIS — R93.5 ABNORMAL CT OF THE ABDOMEN: ICD-10-CM

## 2019-11-04 DIAGNOSIS — R10.12 LEFT UPPER QUADRANT PAIN: ICD-10-CM

## 2019-11-04 DIAGNOSIS — K59.09 CHRONIC CONSTIPATION: Primary | ICD-10-CM

## 2019-11-04 PROCEDURE — 99214 OFFICE O/P EST MOD 30 MIN: CPT | Performed by: NURSE PRACTITIONER

## 2019-11-04 RX ORDER — LACTULOSE 10 G/15ML
SOLUTION ORAL
Qty: 5400 ML | Refills: 0 | Status: SHIPPED | OUTPATIENT
Start: 2019-11-04 | End: 2019-11-25

## 2019-11-04 RX ORDER — LACTULOSE 10 G/15ML
20 SOLUTION ORAL 2 TIMES DAILY PRN
Qty: 473 ML | Refills: 1 | Status: SHIPPED | OUTPATIENT
Start: 2019-11-04 | End: 2019-11-04 | Stop reason: SDUPTHER

## 2019-11-04 NOTE — PROGRESS NOTES
Chief Complaint   Patient presents with   • Abdominal Pain   • Nausea   • Constipation       Adry Knight is a  34 y.o. female here for a ER follow up visit for constipation.    HPI  34-year-old female presents today for an ER follow-up visit for left upper quadrant abdominal pain with chronic constipation.  She is a patient of Dr. Alas.  She was having severe left upper quadrant abdominal pain with constipation and went to the ER at [facility] on 10/23/2019.  Work-up there revealed a CT scan that was positive for moderate constipation and mild distention otherwise negative.  Labs were unremarkable.  Patient was given bowel prep regimen and and told to increase her MiraLAX to twice daily.  She admits this did help have some liquid stool but she still feels like she has not emptied well at all.  She continues to have some intermittent left upper quadrant abdominal pain with bloating and distention.  She has a beef allergy and so she cannot take a lot of medicines that are gelcaps.  She did try Linzess and admits it did not work well for her.  Due to her allergies she cannot tolerate trying Amitiza.  She has also use mag citrate over-the-counter and this has not worked that great for her.  She is also tried several over-the-counter remedies none of which worked.  She has not tried true Sarwat or Motegrity.  She is also not tried lactulose.  She tells me she just feels miserable because this is been a long standing problem and just seems to be getting worse.  She denies any dysphagia, nausea and vomiting, active bleeding or melena.  Admits her appetite is decreased and her weight is stable.  She is due for screening colonoscopy due to all of her issues but has not been able to get cleaned out well enough to do one yet.  Past Medical History:   Diagnosis Date   • Anxiety    • Biliary dyskinesia    • Depression    • Dysphagia    • GERD (gastroesophageal reflux disease)    • Headache    •  Hernia    • Irritable bowel syndrome    • Lactose intolerance    • LFT elevation    • Migraines    • Shingles        Past Surgical History:   Procedure Laterality Date   • CHOLECYSTECTOMY  10/23/18   • CHOLECYSTECTOMY WITH INTRAOPERATIVE CHOLANGIOGRAM N/A 10/24/2018    Procedure: LAPAROSCOPIC CHOLECYSTECTOMY WITH INTRAOPERATIVE CHOLANGIOGRAM, POSSIBLE OPEN;  Surgeon: Zuleima Johansen MD;  Location: [facility] [place] [facility] [place];  Service: General   • TONSILLECTOMY     • UPPER GASTROINTESTINAL ENDOSCOPY N/A 09/21/2015    Normal esophagus, Normal stomach, Normal duodenum, Dr. Marleni Alas   • VAGINAL DELIVERY N/A 10/08/2012    Dr. Brittney Wesley       Scheduled Meds:    Continuous Infusions:  No current facility-administered medications for this visit.     PRN Meds:.    Allergies   Allergen Reactions   • Chocolate Anaphylaxis and GI Intolerance   • Beef-Derived Products GI Intolerance   • Contrast Dye Unknown (See Comments)     TONGUE NUMBNESS   • Gelatin GI Intolerance   • Rice Allergy Skin Test GI Intolerance       Social History     Socioeconomic History   • Marital status:      Spouse name: Not on file   • Number of children: Not on file   • Years of education: Not on file   • Highest education level: Not on file   Tobacco Use   • Smoking status: Never Smoker   • Smokeless tobacco: Never Used   Substance and Sexual Activity   • Alcohol use: No   • Drug use: No   • Sexual activity: Yes     Partners: Male     Birth control/protection: Condom       Family History   Problem Relation Age of Onset   • Depression Mother    • Irritable bowel syndrome Mother    • Alcohol abuse Mother    • Asthma Mother    • Cancer Sister    • Celiac disease Sister    • Cancer Paternal Grandmother    • Celiac disease Sister    • Gallbladder disease Sister    • Irritable bowel syndrome Sister    • Irritable bowel syndrome Maternal Grandmother    • Alcohol abuse Maternal Grandmother    • Depression Maternal Grandmother    • Mental illness Maternal  Grandmother    • Alcohol abuse Brother    • Asthma Brother    • Malig Hyperthermia Neg Hx        Review of Systems   Constitutional: Negative for appetite change, chills, diaphoresis, fatigue, fever and unexpected weight change.   HENT: Negative for nosebleeds, postnasal drip, sore throat, trouble swallowing and voice change.    Respiratory: Negative for cough, choking, chest tightness, shortness of breath and wheezing.    Cardiovascular: Negative for chest pain, palpitations and leg swelling.   Gastrointestinal: Positive for abdominal distention, abdominal pain and constipation. Negative for anal bleeding, blood in stool, diarrhea, nausea, rectal pain and vomiting.   Endocrine: Negative for polydipsia, polyphagia and polyuria.   Musculoskeletal: Negative for gait problem.   Skin: Negative for rash and wound.   Allergic/Immunologic: Negative for food allergies.   Neurological: Negative for dizziness, speech difficulty and light-headedness.   Psychiatric/Behavioral: Negative for confusion, self-injury, sleep disturbance and suicidal ideas.       Vitals:    11/04/19 1502   BP: 116/70   Temp: 98 °F (36.7 °C)       Physical Exam   Constitutional: She is oriented to person, place, and time. She appears well-developed and well-nourished. She does not appear ill. No distress.   HENT:   Head: Normocephalic.   Eyes: Pupils are equal, round, and reactive to light.   Cardiovascular: Normal rate, regular rhythm and normal heart sounds.   Pulmonary/Chest: Effort normal and breath sounds normal.   Abdominal: Soft. Bowel sounds are normal. She exhibits distension. She exhibits no mass. There is no hepatosplenomegaly. There is tenderness. There is no rebound and no guarding. No hernia.       Musculoskeletal: Normal range of motion.   Neurological: She is alert and oriented to person, place, and time.   Skin: Skin is warm and dry.   Psychiatric: She has a normal mood and affect. Her speech is normal and behavior is normal. Judgment  normal.       No images are attached to the encounter.    Assessment and plan    1. Chronic constipation    2. Left upper quadrant pain    3. Abnormal CT of the abdomen      Reviewed ER records with her today.  CT scan did show moderate stool burden with mild abdominal distention.  MiraLAX twice daily does not seem to be working for her.  We will go ahead and let her try some true Sarwat daily as well as may be start her on some lactulose to see if we can get her going.  Patient to find out from the pharmacy if she can take lactulose because she does have a history of lactose intolerance as well as a beef allergy.  She is to call the office with an update later this week.  Patient to follow-up with me in 2 to 3 weeks.  Will plan on scheduling a colonoscopy for her but not until she is able to get on a good bowel regimen.

## 2019-11-06 ENCOUNTER — TELEPHONE (OUTPATIENT)
Dept: GASTROENTEROLOGY | Facility: CLINIC | Age: 34
End: 2019-11-06

## 2019-11-06 NOTE — TELEPHONE ENCOUNTER
----- Message from Zina Carrasquillo sent at 11/6/2019  8:50 AM EST -----  Regarding: symptoms  Contact: [phone]  Pt is calling to talk with a nurse regarding symptoms : severe pain, bloating and nausea what medications could help. Pt is lactose allergic but is trying the lactulose (CHRONULAC) 10 GM/15ML solution

## 2019-11-06 NOTE — TELEPHONE ENCOUNTER
Called pt and pt reports that the pharamcist told her that laculose is a sister to lactose.  They told her that it was not specific to it and since she has an intolerance for lactose but not allergy she can try taking the lactulose.      Pt states she has been taking the trulance once a day and lactulose 30cc once a day.  She reports having had one sm bm.  Pt states she is very bloated, nauseated and having lots of abd pain.    Pt also reports feeling tired.  ADvised pt will send message to Kristen LEY. Pt verb understanding.

## 2019-11-06 NOTE — TELEPHONE ENCOUNTER
Call to pt.  Advise per M Monae to increase the lactulose to twice daily for now.  Call back tomorrow with an update.    Verb understanding.  States cannot do today because working 12 hours - will do tomorrow.

## 2019-11-06 NOTE — TELEPHONE ENCOUNTER
I would have her increase the lactulose to twice daily for now.  Have her call us back tomorrow with an update.

## 2019-11-08 ENCOUNTER — TELEPHONE (OUTPATIENT)
Dept: GASTROENTEROLOGY | Facility: CLINIC | Age: 34
End: 2019-11-08

## 2019-11-08 NOTE — TELEPHONE ENCOUNTER
"Call to pt.  Reports that followed instructions.  Passed small amount stool, but continues to having bloating and LUQ fluctuating pain.  Ranks 10 at worst, 7-8 more routinely.  Eased somewhat after having small BM.      States stomach \"bubbling\" like needs to have BM, but cannot go.  Denies fever.  Asking how to manage persistent constipation.  Advise if pain becomes out of control - go to ER.      Eleonorae MUSHTAQ Licona out of office - will send message to Dr Alas.   "

## 2019-11-08 NOTE — TELEPHONE ENCOUNTER
Regarding: Complaint  Contact: [phone]  ----- Message from [employer], Generic sent at 11/8/2019  7:49 AM EST -----    I was told to take 2 doses of the lactulose and let the office know the following day of the results.. I took 75 ml yesterday and still only went a little bit.. I am still in a lot of pain, severely bloated and nauseous :(

## 2019-11-08 NOTE — TELEPHONE ENCOUNTER
Call to pt.  Advise per Dr Alas that recommend backing off the lactulose as this is probably making bloating worse.  Resume miralax bid.  If no relief by tomorrow take 1/4 bottle mag citrate bid until cleaned out and then resume miralax bid.      Verb understanding.  States after seen in ER 10/23, took 2 bottles of mag citrate in one day.  Also took small bottle of miralax mixed in gatorade and had no results from all of that.  Because of this, skeptical that above instructions will work.     States could not take linzess because allergic to beef, and linzess is in gelcap.      States nauseated.  Trying to drink fluids, but very difficult 2nd nausea.  Head hurts.  Feels so full.    Advise will update DR Alas, may also contact MD on call or go back to ER if symptoms become out of control.  Verb understanding.

## 2019-11-08 NOTE — TELEPHONE ENCOUNTER
Recommend backing off the lactulose as this is probably making bloating worse.  Resume miralax bid, if no relief by tomorrow take 1/4 bottle mag citate bid until cleaned ouot and then resume miralax bid.  pls ask her what happened when she took linzess in the past and whichh dose did she try?

## 2019-11-09 ENCOUNTER — HOSPITAL ENCOUNTER (EMERGENCY)
Facility: HOSPITAL | Age: 34
Discharge: HOME | End: 2019-11-10
Attending: EMERGENCY MEDICINE | Admitting: EMERGENCY MEDICINE

## 2019-11-09 DIAGNOSIS — K59.00 CONSTIPATION, UNSPECIFIED CONSTIPATION TYPE: Primary | ICD-10-CM

## 2019-11-09 LAB
HOLD SPECIMEN: NORMAL
HOLD SPECIMEN: NORMAL
WHOLE BLOOD HOLD SPECIMEN: NORMAL
WHOLE BLOOD HOLD SPECIMEN: NORMAL

## 2019-11-09 PROCEDURE — 99284 EMERGENCY DEPT VISIT MOD MDM: CPT

## 2019-11-09 RX ADMIN — MINERAL OIL 200 ML: 471.95 OIL ORAL at 21:23

## 2019-11-10 ENCOUNTER — APPOINTMENT (OUTPATIENT)
Dept: GENERAL RADIOLOGY | Facility: HOSPITAL | Age: 34
End: 2019-11-10

## 2019-11-10 VITALS
BODY MASS INDEX: 31.98 KG/M2 | TEMPERATURE: 99.1 F | HEIGHT: 63 IN | DIASTOLIC BLOOD PRESSURE: 82 MMHG | WEIGHT: 180.5 LBS | HEART RATE: 82 BPM | SYSTOLIC BLOOD PRESSURE: 126 MMHG | OXYGEN SATURATION: 100 % | RESPIRATION RATE: 18 BRPM

## 2019-11-10 PROCEDURE — 74018 RADEX ABDOMEN 1 VIEW: CPT

## 2019-11-10 NOTE — DISCHARGE INSTRUCTIONS
Decrease lactulose, possibly making bloating worse.    Resume miralax twice daily. if no relief in 24 hours, take 1/4 bottle mag citate twice daily until cleaned out and then resume miralax twice daily.    Call Dr. Alas Monday.  Return to the ER as needed.

## 2019-11-10 NOTE — ED TRIAGE NOTES
"Pt was seen in ER 10/23/19 for abd. Pain and constipation told was \"too high\" for enema. Pt sandra to GI MD has been taking lactulose and states still has not had bowel movement.   "

## 2019-11-10 NOTE — ED PROVIDER NOTES
Pt with hx of IBS and chronic constipation presents to the ED c/o constant constipation and waxing and waning L sided abd pain.  Pt was seen in ED on 10/23/2019 for similar sx and discharged with instructions to take magnesium citrate.  She took the magnesium citrate as instructed without relief.      On exam,   Pt is resting comfortably, in no distress, and without focal neurologic deficit  ABD: soft with diffuse tenderness, bowel sounds normal  CARDIO: RRR, no murmur      Reviewed patient's workup.     I agree with the plan for enema.        Attestation:  The YORDAN and I have discussed this patient's history, physical exam, and treatment plan. I have reviewed the documentation and personally had a face to face interaction with the patient. I affirm the documentation and agree with the treatment and plan. The attached note describes my personal findings.    Documentation assistance provided by jaci Luevano for Dr. Hossein MD. Information recorded by the scribe was done at my direction and has been verified and validated by me.       Louis Luevano  11/09/19 4674       Ángel Catalan MD  11/10/19 0887

## 2019-11-10 NOTE — ED PROVIDER NOTES
"EMERGENCY DEPARTMENT ENCOUNTER    Room Number:  18/18  Date seen:  11/10/2019  Time seen: 8:14 PM  PCP: Cora Palmer, SMITA    HPI:  Chief complaint: constipation   Context:Adry Knight is a 34 y.o. female who presents to the ED with hx of IBS and chronic constipation with c/o waxing and waning left sided abd pain and chronic constipation for several weeks. Pt states that with her hx of IBS she has had episodes of \"severe constipation like this in the past but not for a long time.\" Pt states that there is no alleviating or aggravating factors to her abd pain. Pt also complains of chills, nausea, and HA. Pt denies vomiting, fever, and urinary sx. Pt states that her last normal BM was in early 10/2019 and that she had a \"small amount of stool yesterday but not enough to call a BM.\" Pt denies any \"leaking of uncontrollable stool.\" Pt states that she was seen to the ED on 10/23/19 for constipation and discharged to \"take 2 bottles of magnesium citrate\" and f/u with her PCP which she states she did. Pt states that her PCP instructed her to take \"a whole bottle of miralax\" which she states she also did without relief. Pt states that she was seen to her GI,  on 11/4/19 and instructed to take lactulose and true Sarwat without relief. Pt states that she has done \"all this without any relief.\" Allergic to Linzess due to beef allergy and gelatin capsules.        Onset: gradual  Location:left side of abd  Duration: several weeks  Timing: constnat  Character:pain  Severity: moderate      MEDICAL RECORD REVIEW   Pt was seen to ED on 10/23/19 for constipation and LUQ abd pain. CT abd/pelvis showed formed stool throughout nearly the entire colon but was otherwise unremarkable. Pt was discharged with instructions to f/u with PCP and GI and use OTC medications for symptomatic treatment.      ALLERGIES  Chocolate; Beef-derived products; Contrast dye; Gelatin; and Rice allergy skin test    PAST MEDICAL HISTORY  Active " Ambulatory Problems     Diagnosis Date Noted   • Intractable migraine with aura without status migrainosus 11/11/2015   • Headache, migraine 11/11/2015   • Burning or prickling sensation 11/11/2015   • Anxiety 11/11/2015   • Depression 11/11/2015   • Low back pain without sciatica 01/04/2016   • Neck pain 01/04/2016   • Pain of left scapula 02/01/2016   • Biliary dyskinesia 10/23/2018   • Chronic constipation 05/31/2019   • Gastroesophageal reflux disease without esophagitis 05/31/2019     Resolved Ambulatory Problems     Diagnosis Date Noted   • No Resolved Ambulatory Problems     Past Medical History:   Diagnosis Date   • Anxiety    • Biliary dyskinesia    • Depression    • Dysphagia    • GERD (gastroesophageal reflux disease)    • Headache    • Hernia    • Irritable bowel syndrome    • Lactose intolerance    • LFT elevation    • Migraines    • Shingles        PAST SURGICAL HISTORY  Past Surgical History:   Procedure Laterality Date   • CHOLECYSTECTOMY  10/23/18   • CHOLECYSTECTOMY WITH INTRAOPERATIVE CHOLANGIOGRAM N/A 10/24/2018    Procedure: LAPAROSCOPIC CHOLECYSTECTOMY WITH INTRAOPERATIVE CHOLANGIOGRAM, POSSIBLE OPEN;  Surgeon: Zuleima Johansen MD;  [place] [facility] [place] [facility] [place]  Service: General   • TONSILLECTOMY     • UPPER GASTROINTESTINAL ENDOSCOPY N/A 09/21/2015    Normal esophagus, Normal stomach, Normal duodenum, Dr. Marleni Alas   • VAGINAL DELIVERY N/A 10/08/2012    Dr. Brittney Wesley       FAMILY HISTORY  Family History   Problem Relation Age of Onset   • Depression Mother    • Irritable bowel syndrome Mother    • Alcohol abuse Mother    • Asthma Mother    • Cancer Sister    • Celiac disease Sister    • Cancer Paternal Grandmother    • Celiac disease Sister    • Gallbladder disease Sister    • Irritable bowel syndrome Sister    • Irritable bowel syndrome Maternal Grandmother    • Alcohol abuse Maternal Grandmother    • Depression Maternal Grandmother    • Mental illness Maternal Grandmother    • Alcohol  abuse Brother    • Asthma Brother    • Malig Hyperthermia Neg Hx        SOCIAL HISTORY  Social History     Socioeconomic History   • Marital status:      Spouse name: Not on file   • Number of children: Not on file   • Years of education: Not on file   • Highest education level: Not on file   Tobacco Use   • Smoking status: Never Smoker   • Smokeless tobacco: Never Used   Substance and Sexual Activity   • Alcohol use: No   • Drug use: No   • Sexual activity: Yes     Partners: Male     Birth control/protection: Condom       REVIEW OF SYSTEMS  Review of Systems   Constitutional: Positive for chills. Negative for fever.   Eyes: Negative.    Respiratory: Negative for shortness of breath.    Cardiovascular: Negative for chest pain.   Gastrointestinal: Positive for abdominal pain (left sided ), constipation and nausea. Negative for vomiting.   Genitourinary: Negative.    Musculoskeletal: Negative.    Skin: Negative.    Neurological: Positive for headaches.   Psychiatric/Behavioral: Negative.        PHYSICAL EXAM  ED Triage Vitals   Temp Heart Rate Resp BP SpO2   11/09/19 1908 11/09/19 1908 11/09/19 1908 11/09/19 1919 11/09/19 1908   99.2 °F (37.3 °C) 99 18 130/86 100 %      Temp src Heart Rate Source Patient Position BP Location FiO2 (%)   11/09/19 1908 11/09/19 1908 11/09/19 1919 11/09/19 1919 --   Tympanic Monitor Lying Left arm      Physical Exam   Constitutional: She is oriented to person, place, and time and well-developed, well-nourished, and in no distress.   HENT:   Head: Normocephalic and atraumatic.   Right Ear: External ear normal.   Left Ear: External ear normal.   Nose: Nose normal.   Eyes: Conjunctivae are normal.   Neck: Normal range of motion.   Cardiovascular: Normal rate and regular rhythm.   Pulmonary/Chest: Effort normal and breath sounds normal.   Abdominal: There is tenderness (mild left sided).   Genitourinary:   Genitourinary Comments: No stool in the rectal vault with normal rectal tone  "  Musculoskeletal: Normal range of motion.   Neurological: She is alert and oriented to person, place, and time.   Skin: Skin is warm and dry.   Psychiatric: Affect normal.   Nursing note and vitals reviewed.        RADIOLOGY  XR Abdomen KUB   Final Result   No acute findings.       This report was finalized on 11/10/2019 12:41 AM by Dr. Macey Awad M.D.              I ordered the above noted radiological studies and reviewed the images on the PACS system.      MEDICATIONS GIVEN IN ER  Medications   mineral oil-magnesium hydroxide-glycerin-sorbitol (MMGS) enema (200 mL Rectal Given 11/9/19 2123)         PROCEDURES  Procedures      PROGRESS AND CONSULTS    Progress Notes:       2024-Informed pt that on exam there is no fecal impaction and that manual disimpaction is not indicated. Discussed plan to order enema from the ED. The patient indicates understanding of these issues and agrees with the plan.    2110- Reviewed pt's history and workup with Dr. Catalan.  After a bedside evaluation; Dr Catalan agrees with the plan of care    2214-Per RN, pt had a small BM after the MMGS enema. Pt states that she feels slightly improved. Pt is requesting another enema which I will order. The patient indicates understanding of these issues and agrees with the plan.    0016-Rechecked pt. Pt is resting and states that she feels \"the same.\" Pt has had \"liquidy BM\" after the enema. Notified pt of plan to review XR abd KUB. Pt understands and agrees with the plan, all questions answered.    0111-Rechecked pt. Pt is resting comfortably. Notified pt of XR abd KUB-negative acute. No large stool burden. Discussed the plan to discharge the pt home with instructions for symptomatic treatment. I have her the instructions Dr. Alsa recommended over the phone this week.  I instructed the pt to f/u with her GI for further evaluation and care. Pt understands and agrees with the plan, all questions answered.    DISCHARGE    Patient " "discharged in stable condition.    Reviewed implications of results, diagnosis, meds, responsibility to follow up, warning signs and symptoms of possible worsening, potential complications and reasons to return to ER.    Patient/Family voiced understanding of above instructions.    Discussed plan for discharge, as there is no emergent indication for admission. Patient referred to primary care provider for BP management due to today's BP. Pt/family is agreeable and understands need for follow up and repeat testing.  Pt is aware that discharge does not mean that nothing is wrong but it indicates no emergency is present that requires admission and they must continue care with follow-up as given below or physician of their choice.       Disposition vitals:  /86 (BP Location: Left arm, Patient Position: Lying)   Pulse 99   Temp 99.2 °F (37.3 °C) (Tympanic)   Resp 18   Ht 160 cm (63\")   Wt 81.9 kg (180 lb 8 oz)   LMP 10/26/2019 (Exact Date)   SpO2 100%   BMI 31.97 kg/m²       DIAGNOSIS  Final diagnoses:   Constipation, unspecified constipation type       FOLLOW UP   Cora Palmer PA-C  [id number] [address] [facility]  [place] [facility] [id number] [phone]          Marleni Alas MD  [address]  [place] [facility] [address]  [phone]    Schedule an appointment as soon as possible for a visit         RX     Medication List      No changes were made to your prescriptions during this visit.           Documentation assistance provided by jaci Rinaldi for Alfreda Mills PA-C.  Information recorded by the scribe was done at my direction and has been verified and validated by me.                           Reny Rinaldi  11/10/19 0114       Alfreda Mills PA  11/10/19 0522    "

## 2019-11-20 RX ORDER — RIZATRIPTAN BENZOATE 10 MG/1
TABLET ORAL
Qty: 12 TABLET | Refills: 3 | Status: SHIPPED | OUTPATIENT
Start: 2019-11-20 | End: 2020-03-05

## 2019-11-25 ENCOUNTER — OFFICE VISIT (OUTPATIENT)
Dept: GASTROENTEROLOGY | Facility: CLINIC | Age: 34
End: 2019-11-25

## 2019-11-25 VITALS
TEMPERATURE: 98.5 F | BODY MASS INDEX: 31.97 KG/M2 | SYSTOLIC BLOOD PRESSURE: 118 MMHG | DIASTOLIC BLOOD PRESSURE: 70 MMHG | HEIGHT: 63 IN

## 2019-11-25 DIAGNOSIS — K59.09 CHRONIC CONSTIPATION: Primary | ICD-10-CM

## 2019-11-25 DIAGNOSIS — R10.84 GENERALIZED ABDOMINAL PAIN: ICD-10-CM

## 2019-11-25 DIAGNOSIS — R14.0 ABDOMINAL BLOATING: ICD-10-CM

## 2019-11-25 PROCEDURE — 99214 OFFICE O/P EST MOD 30 MIN: CPT | Performed by: NURSE PRACTITIONER

## 2019-11-25 NOTE — PROGRESS NOTES
Chief Complaint   Patient presents with   • Constipation       Adry Knight is a  34 y.o. female here for a ER follow up visit for constipation.    HPI  34-year-old female presents today for an ER follow-up visit for chronic constipation and abdominal pain with bloating.  She is a patient of Dr. Alas.  She was last seen in the office on 11/4/2019.  She is happy to report that since her ER visit she has gone back on her gluten and dairy and is also been drinking soda and admits now her bowels are moving well.  She is currently taking Trulance daily and MiraLAX as needed.  She is not having any issues with constipation bloating or abdominal pain now.  She denies any dysphagia, reflux, nausea and vomiting, diarrhea, constipation, rectal bleeding or melena.  She was her appetite is good and her weight is stable.  Past Medical History:   Diagnosis Date   • Anxiety    • Biliary dyskinesia    • Depression    • Dysphagia    • GERD (gastroesophageal reflux disease)    • Headache    • Hernia    • Irritable bowel syndrome    • Lactose intolerance    • LFT elevation    • Migraines    • Shingles        Past Surgical History:   Procedure Laterality Date   • CHOLECYSTECTOMY  10/23/18   • CHOLECYSTECTOMY WITH INTRAOPERATIVE CHOLANGIOGRAM N/A 10/24/2018    Procedure: LAPAROSCOPIC CHOLECYSTECTOMY WITH INTRAOPERATIVE CHOLANGIOGRAM, POSSIBLE OPEN;  Surgeon: Zuleima Johansen MD;  [place] [facility] [place] [facility] [place]: [place]   • TONSILLECTOMY     • UPPER GASTROINTESTINAL ENDOSCOPY N/A 09/21/2015    Normal esophagus, Normal stomach, Normal duodenum, Dr. Marleni Alas   • VAGINAL DELIVERY N/A 10/08/2012    Dr. Brittney Wesley       Scheduled Meds:    Continuous Infusions:  No current facility-administered medications for this visit.     PRN Meds:.    Allergies   Allergen Reactions   • Chocolate Anaphylaxis and GI Intolerance   • Beef-Derived Products GI Intolerance   • Contrast Dye Unknown (See Comments)     TONGUE NUMBNESS   •  Gelatin GI Intolerance   • Rice Allergy Skin Test GI Intolerance       Social History     Socioeconomic History   • Marital status:      Spouse name: Not on file   • Number of children: Not on file   • Years of education: Not on file   • Highest education level: Not on file   Tobacco Use   • Smoking status: Never Smoker   • Smokeless tobacco: Never Used   Substance and Sexual Activity   • Alcohol use: No   • Drug use: No   • Sexual activity: Yes     Partners: Male     Birth control/protection: Condom       Family History   Problem Relation Age of Onset   • Depression Mother    • Irritable bowel syndrome Mother    • Alcohol abuse Mother    • Asthma Mother    • Cancer Sister    • Celiac disease Sister    • Cancer Paternal Grandmother    • Celiac disease Sister    • Gallbladder disease Sister    • Irritable bowel syndrome Sister    • Irritable bowel syndrome Maternal Grandmother    • Alcohol abuse Maternal Grandmother    • Depression Maternal Grandmother    • Mental illness Maternal Grandmother    • Alcohol abuse Brother    • Asthma Brother    • Malig Hyperthermia Neg Hx        Review of Systems   Constitutional: Negative for appetite change, chills, diaphoresis, fatigue, fever and unexpected weight change.   HENT: Negative for nosebleeds, postnasal drip, sore throat, trouble swallowing and voice change.    Respiratory: Negative for cough, choking, chest tightness, shortness of breath and wheezing.    Cardiovascular: Negative for chest pain, palpitations and leg swelling.   Gastrointestinal: Negative for abdominal distention, abdominal pain, anal bleeding, blood in stool, constipation, diarrhea, nausea, rectal pain and vomiting.   Endocrine: Negative for polydipsia, polyphagia and polyuria.   Musculoskeletal: Negative for gait problem.   Skin: Negative for rash and wound.   Allergic/Immunologic: Negative for food allergies.   Neurological: Negative for dizziness, speech difficulty and light-headedness.    Psychiatric/Behavioral: Negative for confusion, self-injury, sleep disturbance and suicidal ideas.       Vitals:    11/25/19 1145   BP: 118/70   Temp: 98.5 °F (36.9 °C)       Physical Exam   Constitutional: She is oriented to person, place, and time. She appears well-developed and well-nourished. She does not appear ill. No distress.   HENT:   Head: Normocephalic.   Eyes: Pupils are equal, round, and reactive to light.   Cardiovascular: Normal rate, regular rhythm and normal heart sounds.   Pulmonary/Chest: Effort normal and breath sounds normal.   Abdominal: Soft. Bowel sounds are normal. She exhibits no distension and no mass. There is no hepatosplenomegaly. There is no tenderness. There is no rebound and no guarding. No hernia.   Musculoskeletal: Normal range of motion.   Neurological: She is alert and oriented to person, place, and time.   Skin: Skin is warm and dry.   Psychiatric: She has a normal mood and affect. Her speech is normal and behavior is normal. Judgment normal.       No images are attached to the encounter.    Assessment and plan     1. Chronic constipation  - Plecanatide (TRULANCE) 3 MG tablet; Take 1 tablet by mouth Daily.  Dispense: 90 tablet; Refill: 3  - Case Request; Standing  - Case Request    2. Abdominal bloating  - Case Request; Standing  - Case Request    3. Generalized abdominal pain  - Case Request; Standing  - Case Request    Reviewed ER records with her today.  Since getting back onto gluten and dairy as well as drinking soda she is having regular bowel movements with using Trulance and MiraLAX as needed.  We will go ahead and give her more samples of Trulance and call in a prescription.  Patient to call the office with any issues.  Patient to follow-up with Dr. Alas in 3 months.

## 2019-11-26 ENCOUNTER — TELEPHONE (OUTPATIENT)
Dept: GASTROENTEROLOGY | Facility: CLINIC | Age: 34
End: 2019-11-26

## 2019-11-26 NOTE — TELEPHONE ENCOUNTER
Call to [facility] @ [phone] and spoke with Chantelle - need updated insurance info.     Call to pt to advise of above.  States  picked up meds yesterday without any problem.  Was advised by Pharmacist yesterday that PA has been faxed to this office.    Advise pt will send message for PA - and to also f/u with pharmacy.  Verb understanding.

## 2019-11-26 NOTE — TELEPHONE ENCOUNTER
Regarding: Prescription Question  Contact: [phone]  ----- Message from AndreasAPImetricst, Generic sent at 11/25/2019  4:28 PM EST -----    The insurance company is needing more information on the Trulance from you. [facility] is saying the insurance company will not cover it until they hear back from you about why I need to take this particular drug.

## 2019-11-27 ENCOUNTER — PRIOR AUTHORIZATION (OUTPATIENT)
Dept: GASTROENTEROLOGY | Facility: CLINIC | Age: 34
End: 2019-11-27

## 2019-12-16 RX ORDER — OSELTAMIVIR PHOSPHATE 75 MG/1
75 CAPSULE ORAL DAILY
Qty: 10 CAPSULE | Refills: 0 | Status: SHIPPED | OUTPATIENT
Start: 2019-12-16 | End: 2020-01-06

## 2019-12-18 DIAGNOSIS — K59.09 CHRONIC CONSTIPATION: ICD-10-CM

## 2019-12-18 RX ORDER — MONTELUKAST SODIUM 10 MG/1
10 TABLET ORAL NIGHTLY
Qty: 90 TABLET | Refills: 3 | Status: SHIPPED | OUTPATIENT
Start: 2019-12-18 | End: 2020-03-05

## 2019-12-23 DIAGNOSIS — N91.2 AMENORRHEA: Primary | ICD-10-CM

## 2020-01-06 ENCOUNTER — OFFICE VISIT (OUTPATIENT)
Dept: FAMILY MEDICINE CLINIC | Facility: CLINIC | Age: 35
End: 2020-01-06

## 2020-01-06 VITALS
OXYGEN SATURATION: 100 % | DIASTOLIC BLOOD PRESSURE: 74 MMHG | HEIGHT: 63 IN | SYSTOLIC BLOOD PRESSURE: 120 MMHG | HEART RATE: 85 BPM | TEMPERATURE: 98.1 F | WEIGHT: 184 LBS | BODY MASS INDEX: 32.6 KG/M2 | RESPIRATION RATE: 16 BRPM

## 2020-01-06 DIAGNOSIS — G43.109 MIGRAINE WITH AURA AND WITHOUT STATUS MIGRAINOSUS, NOT INTRACTABLE: ICD-10-CM

## 2020-01-06 DIAGNOSIS — K59.09 CHRONIC CONSTIPATION: ICD-10-CM

## 2020-01-06 DIAGNOSIS — K21.9 GASTROESOPHAGEAL REFLUX DISEASE WITHOUT ESOPHAGITIS: ICD-10-CM

## 2020-01-06 DIAGNOSIS — N92.6 IRREGULAR MENSES: ICD-10-CM

## 2020-01-06 DIAGNOSIS — G43.119 INTRACTABLE MIGRAINE WITH AURA WITHOUT STATUS MIGRAINOSUS: ICD-10-CM

## 2020-01-06 DIAGNOSIS — F33.1 MODERATE EPISODE OF RECURRENT MAJOR DEPRESSIVE DISORDER (HCC): ICD-10-CM

## 2020-01-06 DIAGNOSIS — Z00.00 LABORATORY EXAMINATION ORDERED AS PART OF A ROUTINE GENERAL MEDICAL EXAMINATION: ICD-10-CM

## 2020-01-06 DIAGNOSIS — F41.9 ANXIETY: Primary | ICD-10-CM

## 2020-01-06 PROBLEM — G43.719 INTRACTABLE CHRONIC MIGRAINE WITHOUT AURA: Status: ACTIVE | Noted: 2020-01-06

## 2020-01-06 PROBLEM — K82.8 BILIARY DYSKINESIA: Status: RESOLVED | Noted: 2018-10-23 | Resolved: 2020-01-06

## 2020-01-06 PROCEDURE — 99214 OFFICE O/P EST MOD 30 MIN: CPT | Performed by: PHYSICIAN ASSISTANT

## 2020-01-06 RX ORDER — BUPROPION HYDROCHLORIDE 300 MG/1
300 TABLET ORAL EVERY MORNING
Qty: 30 TABLET | Refills: 5 | Status: SHIPPED | OUTPATIENT
Start: 2020-01-06 | End: 2020-03-05

## 2020-01-06 RX ORDER — ESCITALOPRAM OXALATE 5 MG/1
TABLET ORAL
Qty: 30 TABLET | Refills: 5 | Status: SHIPPED | OUTPATIENT
Start: 2020-01-06 | End: 2020-03-05

## 2020-01-06 NOTE — PROGRESS NOTES
"Sravan Knight is a 34 y.o. female.     History of Present Illness    Since the last visit, she has overall felt depressed and anxious.  She has GERD controlled on PPI Rx, Migraine headaches and responding well to PRN triptan Rx and Migraine headaches and well controlled on suppression medication.  she has been compliant with current medications have reviewed them.  The patient denies medication side effects.  Will refill medications. /74 (BP Location: Left arm, Patient Position: Sitting, Cuff Size: Large Adult)   Pulse 85   Temp 98.1 °F (36.7 °C) (Oral)   Resp 16   Ht 160 cm (63\")   Wt 83.5 kg (184 lb)   LMP 12/23/2019   SpO2 100%   BMI 32.59 kg/m²   She still needs to  the Singulair.  Results for orders placed or performed during the hospital encounter of 11/09/19   Light Blue Top   Result Value Ref Range    Extra Tube hold for add-on    Green Top (Gel)   Result Value Ref Range    Extra Tube Hold for add-ons.    Lavender Top   Result Value Ref Range    Extra Tube hold for add-on    Gold Top - SST   Result Value Ref Range    Extra Tube Hold for add-ons.            She needs PA for [place] for IBS constipation.   I need to note she is already been on Amitiza, lactulose, Linzess.  She cannot take Linzess because she is allergic to beef and the capsule has been finished in the same concern with Amitiza that is why we need to use the Trulance.  Lactulose failed      Effexor caused headache   Zoloft made to gain 50 pounds   Celexa had GI upset   Pristiq caused nausea   Not sure what Prozac did      The following portions of the patient's history were reviewed and updated as appropriate: allergies, current medications, past family history, past medical history, past social history, past surgical history and problem list.    Review of Systems   Constitutional: Negative for activity change, appetite change and unexpected weight change.   HENT: Negative for nosebleeds and trouble " swallowing.    Eyes: Negative for pain and visual disturbance.   Respiratory: Negative for chest tightness, shortness of breath and wheezing.    Cardiovascular: Negative for chest pain and palpitations.   Gastrointestinal: Positive for abdominal pain and constipation. Negative for blood in stool.   Endocrine: Negative.    Genitourinary: Negative for difficulty urinating and hematuria.   Musculoskeletal: Positive for arthralgias. Negative for joint swelling.   Skin: Negative for color change and rash.   Allergic/Immunologic: Negative.    Neurological: Positive for headaches. Negative for syncope and speech difficulty.   Hematological: Negative for adenopathy.   Psychiatric/Behavioral: Negative for agitation and confusion.   All other systems reviewed and are negative.      Objective   Physical Exam   Constitutional: She is oriented to person, place, and time. She appears well-developed and well-nourished. No distress.   HENT:   Head: Normocephalic and atraumatic.   Eyes: Pupils are equal, round, and reactive to light. Conjunctivae and EOM are normal. Right eye exhibits no discharge. Left eye exhibits no discharge. No scleral icterus.   Neck: Normal range of motion. Neck supple. No tracheal deviation present. No thyromegaly present.   Cardiovascular: Normal rate, regular rhythm, normal heart sounds, intact distal pulses and normal pulses. Exam reveals no gallop.   No murmur heard.  Pulmonary/Chest: Effort normal and breath sounds normal. No respiratory distress. She has no wheezes. She has no rales.   Musculoskeletal: Normal range of motion.   Neurological: She is alert and oriented to person, place, and time. She exhibits normal muscle tone. Coordination normal.   Skin: Skin is warm. No rash noted. No erythema. No pallor.   Psychiatric: She has a normal mood and affect. Her behavior is normal. Judgment and thought content normal.   Nursing note and vitals reviewed.      Assessment/Plan   Adry was seen today for  anxiety.    Diagnoses and all orders for this visit:    Anxiety  -     buPROPion XL (WELLBUTRIN XL) 300 MG 24 hr tablet; Take 1 tablet by mouth Every Morning. For mood  -     Comprehensive metabolic panel  -     Lipid panel  -     CBC and Differential  -     TSH  -     T3, Free  -     T4, Free  -     Vitamin B12  -     Folate  -     Vitamin D 25 Hydroxy  -     hCG, Serum, Qualitative    Migraine with aura and without status migrainosus, not intractable  -     Comprehensive metabolic panel  -     Lipid panel  -     CBC and Differential  -     TSH  -     T3, Free  -     T4, Free  -     Vitamin B12  -     Folate  -     Vitamin D 25 Hydroxy  -     hCG, Serum, Qualitative    Moderate episode of recurrent major depressive disorder (CMS/HCC)  -     Comprehensive metabolic panel  -     Lipid panel  -     CBC and Differential  -     TSH  -     T3, Free  -     T4, Free  -     Vitamin B12  -     Folate  -     Vitamin D 25 Hydroxy  -     hCG, Serum, Qualitative    Gastroesophageal reflux disease without esophagitis  -     Comprehensive metabolic panel  -     Lipid panel  -     CBC and Differential  -     TSH  -     T3, Free  -     T4, Free  -     Vitamin B12  -     Folate  -     Vitamin D 25 Hydroxy  -     hCG, Serum, Qualitative    Chronic constipation  -     Comprehensive metabolic panel  -     Lipid panel  -     CBC and Differential  -     TSH  -     T3, Free  -     T4, Free  -     Vitamin B12  -     Folate  -     Vitamin D 25 Hydroxy  -     hCG, Serum, Qualitative    Intractable migraine with aura without status migrainosus  -     Comprehensive metabolic panel  -     Lipid panel  -     CBC and Differential  -     TSH  -     T3, Free  -     T4, Free  -     Vitamin B12  -     Folate  -     Vitamin D 25 Hydroxy  -     hCG, Serum, Qualitative    Irregular menses  -     Comprehensive metabolic panel  -     Lipid panel  -     CBC and Differential  -     TSH  -     T3, Free  -     T4, Free  -     Vitamin B12  -     Folate  -      Vitamin D 25 Hydroxy  -     hCG, Serum, Qualitative    Laboratory examination ordered as part of a routine general medical examination  -     Comprehensive metabolic panel  -     Lipid panel  -     CBC and Differential  -     TSH  -     T3, Free  -     T4, Free  -     Vitamin B12  -     Folate  -     Vitamin D 25 Hydroxy  -     hCG, Serum, Qualitative    Other orders  -     escitalopram (LEXAPRO) 5 MG tablet; Start with 1/2 tab PO daily for 4 days then one po daily for stress        I will see about a PA for her GI IBS constipation medicine  Plan, Adry Knight, was seen today.  she was seen for GERD and will continue on PPI medication, Migraine headaches and will continue with their PRN triptan and Migraine headaches and well controlled on their suppressive medication.  BuSpar is just not helping with anxiety and depression I will go ahead and change her to Lexapro and keep Wellbutrin

## 2020-01-06 NOTE — PATIENT INSTRUCTIONS
I want her to look up gastroparesis    Exercising to Lose Weight  Exercise is structured, repetitive physical activity to improve fitness and health. Getting regular exercise is important for everyone. It is especially important if you are overweight. Being overweight increases your risk of heart disease, stroke, diabetes, high blood pressure, and several types of cancer. Reducing your calorie intake and exercising can help you lose weight.  Exercise is usually categorized as moderate or vigorous intensity. To lose weight, most people need to do a certain amount of moderate-intensity or vigorous-intensity exercise each week.  Moderate-intensity exercise    Moderate-intensity exercise is any activity that gets you moving enough to burn at least three times more energy (calories) than if you were sitting.  Examples of moderate exercise include:  · Walking a mile in 15 minutes.  · Doing light yard work.  · Biking at an easy pace.  Most people should get at least 150 minutes (2 hours and 30 minutes) a week of moderate-intensity exercise to maintain their body weight.  Vigorous-intensity exercise  Vigorous-intensity exercise is any activity that gets you moving enough to burn at least six times more calories than if you were sitting. When you exercise at this intensity, you should be working hard enough that you are not able to carry on a conversation.  Examples of vigorous exercise include:  · Running.  · Playing a team sport, such as football, basketball, and soccer.  · Jumping rope.  Most people should get at least 75 minutes (1 hour and 15 minutes) a week of vigorous-intensity exercise to maintain their body weight.  How can exercise affect me?  When you exercise enough to burn more calories than you eat, you lose weight. Exercise also reduces body fat and builds muscle. The more muscle you have, the more calories you burn. Exercise also:  · Improves mood.  · Reduces stress and tension.  · Improves your overall  fitness, flexibility, and endurance.  · Increases bone strength.  The amount of exercise you need to lose weight depends on:  · Your age.  · The type of exercise.  · Any health conditions you have.  · Your overall physical ability.  Talk to your health care provider about how much exercise you need and what types of activities are safe for you.  What actions can I take to lose weight?  Nutrition    · Make changes to your diet as told by your health care provider or diet and nutrition specialist (dietitian). This may include:  ? Eating fewer calories.  ? Eating more protein.  ? Eating less unhealthy fats.  ? Eating a diet that includes fresh fruits and vegetables, whole grains, low-fat dairy products, and lean protein.  ? Avoiding foods with added fat, salt, and sugar.  · Drink plenty of water while you exercise to prevent dehydration or heat stroke.  Activity  · Choose an activity that you enjoy and set realistic goals. Your health care provider can help you make an exercise plan that works for you.  · Exercise at a moderate or vigorous intensity most days of the week.  ? The intensity of exercise may vary from person to person. You can tell how intense a workout is for you by paying attention to your breathing and heartbeat. Most people will notice their breathing and heartbeat get faster with more intense exercise.  · Do resistance training twice each week, such as:  ? Push-ups.  ? Sit-ups.  ? Lifting weights.  ? Using resistance bands.  · Getting short amounts of exercise can be just as helpful as long structured periods of exercise. If you have trouble finding time to exercise, try to include exercise in your daily routine.  ? Get up, stretch, and walk around every 30 minutes throughout the day.  ? Go for a walk during your lunch break.  ? Park your car farther away from your destination.  ? If you take public transportation, get off one stop early and walk the rest of the way.  ? Make phone calls while standing  up and walking around.  ? Take the stairs instead of elevators or escalators.  · Wear comfortable clothes and shoes with good support.  · Do not exercise so much that you hurt yourself, feel dizzy, or get very short of breath.  Where to find more information  · [place] Department of Health and Human Services: www.hhs.gov  · Centers for Disease Control and Prevention (CDC): www.cdc.gov  Contact a health care provider:  · Before starting a new exercise program.  · If you have questions or concerns about your weight.  · If you have a medical problem that keeps you from exercising.  Get help right away if you have any of the following while exercising:  · Injury.  · Dizziness.  · Difficulty breathing or shortness of breath that does not go away when you stop exercising.  · Chest pain.  · Rapid heartbeat.  Summary  · Being overweight increases your risk of heart disease, stroke, diabetes, high blood pressure, and several types of cancer.  · Losing weight happens when you burn more calories than you eat.  · Reducing the amount of calories you eat in addition to getting regular moderate or vigorous exercise each week helps you lose weight.  This information is not intended to replace advice given to you by your health care provider. Make sure you discuss any questions you have with your health care provider.  Document Released: 01/20/2012 Document Revised: 12/31/2018 Document Reviewed: 12/31/2018  [employer] Interactive Patient Education © 2019 [employer]

## 2020-01-09 PROBLEM — R10.84 GENERALIZED ABDOMINAL PAIN: Status: ACTIVE | Noted: 2020-01-09

## 2020-01-09 PROBLEM — R14.0 ABDOMINAL BLOATING: Status: ACTIVE | Noted: 2020-01-09

## 2020-01-13 ENCOUNTER — TELEPHONE (OUTPATIENT)
Dept: FAMILY MEDICINE CLINIC | Facility: CLINIC | Age: 35
End: 2020-01-13

## 2020-01-13 NOTE — TELEPHONE ENCOUNTER
Spoke to pt and advised       ----- Message -----   From: Cora Palmer PA-C   Sent: 1/10/2020  10:50 AM EST   To: Madelaine Mendosa     Please let her know   ----- Message -----   From: Madelaine Mendosa   Sent: 1/10/2020   9:26 AM EST   To: Cora Palmer PA-C     This was already submitted and denied per insurance; Appeal was denied as well   ----- Message -----   From: Cora Palmer PA-C   Sent: 1/6/2020  10:54 AM EST   To: Madelaine Mendosa     She is can need a prior authorization for true [place] and I did write notes today about what she has failed

## 2020-01-14 LAB
25(OH)D3+25(OH)D2 SERPL-MCNC: 21.6 NG/ML (ref 30–100)
ALBUMIN SERPL-MCNC: 4.4 G/DL (ref 3.5–5.2)
ALBUMIN/GLOB SERPL: 1.8 G/DL
ALP SERPL-CCNC: 64 U/L (ref 39–117)
ALT SERPL-CCNC: 27 U/L (ref 1–33)
AST SERPL-CCNC: 15 U/L (ref 1–32)
B-HCG SERPL QL: NEGATIVE
BASOPHILS # BLD AUTO: 0.05 10*3/MM3 (ref 0–0.2)
BASOPHILS NFR BLD AUTO: 0.5 % (ref 0–1.5)
BILIRUB SERPL-MCNC: 0.2 MG/DL (ref 0.2–1.2)
BUN SERPL-MCNC: 14 MG/DL (ref 6–20)
BUN/CREAT SERPL: 16.7 (ref 7–25)
CALCIUM SERPL-MCNC: 9.2 MG/DL (ref 8.6–10.5)
CHLORIDE SERPL-SCNC: 106 MMOL/L (ref 98–107)
CHOLEST SERPL-MCNC: 149 MG/DL (ref 0–200)
CO2 SERPL-SCNC: 24.7 MMOL/L (ref 22–29)
CREAT SERPL-MCNC: 0.84 MG/DL (ref 0.57–1)
EOSINOPHIL # BLD AUTO: 0.06 10*3/MM3 (ref 0–0.4)
EOSINOPHIL NFR BLD AUTO: 0.6 % (ref 0.3–6.2)
ERYTHROCYTE [DISTWIDTH] IN BLOOD BY AUTOMATED COUNT: 12.4 % (ref 12.3–15.4)
FOLATE SERPL-MCNC: 8.52 NG/ML (ref 4.78–24.2)
GLOBULIN SER CALC-MCNC: 2.5 GM/DL
GLUCOSE SERPL-MCNC: 80 MG/DL (ref 65–99)
HCT VFR BLD AUTO: 42.7 % (ref 34–46.6)
HDLC SERPL-MCNC: 43 MG/DL (ref 40–60)
HGB BLD-MCNC: 14.6 G/DL (ref 12–15.9)
IMM GRANULOCYTES # BLD AUTO: 0.04 10*3/MM3 (ref 0–0.05)
IMM GRANULOCYTES NFR BLD AUTO: 0.4 % (ref 0–0.5)
LDLC SERPL CALC-MCNC: 95 MG/DL (ref 0–100)
LYMPHOCYTES # BLD AUTO: 1.76 10*3/MM3 (ref 0.7–3.1)
LYMPHOCYTES NFR BLD AUTO: 18.8 % (ref 19.6–45.3)
MCH RBC QN AUTO: 30.9 PG (ref 26.6–33)
MCHC RBC AUTO-ENTMCNC: 34.2 G/DL (ref 31.5–35.7)
MCV RBC AUTO: 90.5 FL (ref 79–97)
MONOCYTES # BLD AUTO: 0.62 10*3/MM3 (ref 0.1–0.9)
MONOCYTES NFR BLD AUTO: 6.6 % (ref 5–12)
NEUTROPHILS # BLD AUTO: 6.84 10*3/MM3 (ref 1.7–7)
NEUTROPHILS NFR BLD AUTO: 73.1 % (ref 42.7–76)
NRBC BLD AUTO-RTO: 0 /100 WBC (ref 0–0.2)
PLATELET # BLD AUTO: 219 10*3/MM3 (ref 140–450)
POTASSIUM SERPL-SCNC: 4.5 MMOL/L (ref 3.5–5.2)
PROT SERPL-MCNC: 6.9 G/DL (ref 6–8.5)
RBC # BLD AUTO: 4.72 10*6/MM3 (ref 3.77–5.28)
SODIUM SERPL-SCNC: 141 MMOL/L (ref 136–145)
T3FREE SERPL-MCNC: 2.8 PG/ML (ref 2–4.4)
T4 FREE SERPL-MCNC: 1.1 NG/DL (ref 0.93–1.7)
TRIGL SERPL-MCNC: 53 MG/DL (ref 0–150)
TSH SERPL DL<=0.005 MIU/L-ACNC: 1.56 UIU/ML (ref 0.27–4.2)
VIT B12 SERPL-MCNC: 345 PG/ML (ref 211–946)
VLDLC SERPL CALC-MCNC: 10.6 MG/DL (ref 5–40)
WBC # BLD AUTO: 9.37 10*3/MM3 (ref 3.4–10.8)

## 2020-01-31 ENCOUNTER — TELEPHONE (OUTPATIENT)
Dept: GASTROENTEROLOGY | Facility: CLINIC | Age: 35
End: 2020-01-31

## 2020-01-31 NOTE — TELEPHONE ENCOUNTER
----- Message from Dayna Mendez sent at 1/31/2020  3:16 PM EST -----  Regarding: add egd   Contact: [phone]  Pt is calling having a scope on Tuesday & is asking can the egd be added ?

## 2020-01-31 NOTE — TELEPHONE ENCOUNTER
Called pt and pt reports that she had already spoken with someone and they had sent a message to Dr Alas about adding and egd to her upcoming c/s on 02/04.

## 2020-02-03 ENCOUNTER — TELEPHONE (OUTPATIENT)
Dept: GASTROENTEROLOGY | Facility: CLINIC | Age: 35
End: 2020-02-03

## 2020-02-03 ENCOUNTER — PREP FOR SURGERY (OUTPATIENT)
Dept: OTHER | Facility: HOSPITAL | Age: 35
End: 2020-02-03

## 2020-02-04 ENCOUNTER — HOSPITAL ENCOUNTER (OUTPATIENT)
Facility: HOSPITAL | Age: 35
Setting detail: HOSPITAL OUTPATIENT SURGERY
Discharge: HOME | End: 2020-02-04
Attending: INTERNAL MEDICINE | Admitting: INTERNAL MEDICINE

## 2020-02-04 ENCOUNTER — ANESTHESIA (OUTPATIENT)
Dept: GASTROENTEROLOGY | Facility: HOSPITAL | Age: 35
End: 2020-02-04

## 2020-02-04 ENCOUNTER — ANESTHESIA EVENT (OUTPATIENT)
Dept: GASTROENTEROLOGY | Facility: HOSPITAL | Age: 35
End: 2020-02-04

## 2020-02-04 VITALS
OXYGEN SATURATION: 100 % | BODY MASS INDEX: 32.96 KG/M2 | RESPIRATION RATE: 12 BRPM | DIASTOLIC BLOOD PRESSURE: 72 MMHG | WEIGHT: 186 LBS | HEIGHT: 63 IN | SYSTOLIC BLOOD PRESSURE: 107 MMHG | HEART RATE: 72 BPM | TEMPERATURE: 98.5 F

## 2020-02-04 DIAGNOSIS — R10.84 GENERALIZED ABDOMINAL PAIN: ICD-10-CM

## 2020-02-04 DIAGNOSIS — K59.09 CHRONIC CONSTIPATION: ICD-10-CM

## 2020-02-04 DIAGNOSIS — R14.0 ABDOMINAL BLOATING: ICD-10-CM

## 2020-02-04 PROCEDURE — 88305 TISSUE EXAM BY PATHOLOGIST: CPT | Performed by: INTERNAL MEDICINE

## 2020-02-04 PROCEDURE — 43239 EGD BIOPSY SINGLE/MULTIPLE: CPT | Performed by: INTERNAL MEDICINE

## 2020-02-04 PROCEDURE — 81025 URINE PREGNANCY TEST: CPT | Performed by: INTERNAL MEDICINE

## 2020-02-04 PROCEDURE — 45378 DIAGNOSTIC COLONOSCOPY: CPT | Performed by: INTERNAL MEDICINE

## 2020-02-04 PROCEDURE — S0260 H&P FOR SURGERY: HCPCS | Performed by: INTERNAL MEDICINE

## 2020-02-04 PROCEDURE — 25010000002 PROPOFOL 10 MG/ML EMULSION: Performed by: ANESTHESIOLOGY

## 2020-02-04 RX ORDER — PROPOFOL 10 MG/ML
VIAL (ML) INTRAVENOUS CONTINUOUS PRN
Status: DISCONTINUED | OUTPATIENT
Start: 2020-02-04 | End: 2020-02-04 | Stop reason: SURG

## 2020-02-04 RX ORDER — LIDOCAINE HYDROCHLORIDE 20 MG/ML
INJECTION, SOLUTION INFILTRATION; PERINEURAL AS NEEDED
Status: DISCONTINUED | OUTPATIENT
Start: 2020-02-04 | End: 2020-02-04 | Stop reason: SURG

## 2020-02-04 RX ORDER — PROPOFOL 10 MG/ML
VIAL (ML) INTRAVENOUS AS NEEDED
Status: DISCONTINUED | OUTPATIENT
Start: 2020-02-04 | End: 2020-02-04 | Stop reason: SURG

## 2020-02-04 RX ORDER — SODIUM CHLORIDE, SODIUM LACTATE, POTASSIUM CHLORIDE, CALCIUM CHLORIDE 600; 310; 30; 20 MG/100ML; MG/100ML; MG/100ML; MG/100ML
30 INJECTION, SOLUTION INTRAVENOUS CONTINUOUS PRN
Status: DISCONTINUED | OUTPATIENT
Start: 2020-02-04 | End: 2020-02-04 | Stop reason: HOSPADM

## 2020-02-04 RX ORDER — GLYCOPYRROLATE 0.2 MG/ML
INJECTION INTRAMUSCULAR; INTRAVENOUS AS NEEDED
Status: DISCONTINUED | OUTPATIENT
Start: 2020-02-04 | End: 2020-02-04 | Stop reason: SURG

## 2020-02-04 RX ORDER — SODIUM CHLORIDE 0.9 % (FLUSH) 0.9 %
10 SYRINGE (ML) INJECTION AS NEEDED
Status: DISCONTINUED | OUTPATIENT
Start: 2020-02-04 | End: 2020-02-04 | Stop reason: HOSPADM

## 2020-02-04 RX ADMIN — PROPOFOL 160 MCG/KG/MIN: 10 INJECTION, EMULSION INTRAVENOUS at 10:24

## 2020-02-04 RX ADMIN — PROPOFOL 100 MG: 10 INJECTION, EMULSION INTRAVENOUS at 10:24

## 2020-02-04 RX ADMIN — LIDOCAINE HYDROCHLORIDE 100 MG: 20 INJECTION, SOLUTION INFILTRATION; PERINEURAL at 10:24

## 2020-02-04 RX ADMIN — SODIUM CHLORIDE, POTASSIUM CHLORIDE, SODIUM LACTATE AND CALCIUM CHLORIDE 30 ML/HR: 600; 310; 30; 20 INJECTION, SOLUTION INTRAVENOUS at 10:18

## 2020-02-04 RX ADMIN — GLYCOPYRROLATE 100 MCG: 0.2 INJECTION INTRAMUSCULAR; INTRAVENOUS at 10:24

## 2020-02-04 NOTE — ANESTHESIA POSTPROCEDURE EVALUATION
Patient: Adry DYER Prophater    Procedure Summary     Date:  02/04/20 Room / Location:  [facility] ENDOSCOPY 10 / [facility] ENDOSCOPY    Anesthesia Start:  1022 Anesthesia Stop:  1054    Procedures:       COLONOSCOPY to cecum (N/A )      ESOPHAGOGASTRODUODENOSCOPY with cold bx (N/A Esophagus) Diagnosis:       Chronic constipation      Abdominal bloating      Generalized abdominal pain      (Chronic constipation [K59.09])      (Abdominal bloating [R14.0])      (Generalized abdominal pain [R10.84])    Surgeon:  Marleni Alas MD Provider:  Christina Bustillo MD    Anesthesia Type:  MAC ASA Status:  2          Anesthesia Type: MAC    Vitals  Vitals Value Taken Time   /72 2/4/2020 11:22 AM   Temp     Pulse 72 2/4/2020 11:22 AM   Resp 12 2/4/2020 11:22 AM   SpO2 100 % 2/4/2020 11:22 AM           Post Anesthesia Care and Evaluation    Patient location during evaluation: bedside  Patient participation: complete - patient participated  Level of consciousness: awake and alert  Pain management: adequate  Airway patency: patent  Anesthetic complications: No anesthetic complications  PONV Status: controlled  Cardiovascular status: acceptable  Respiratory status: acceptable  Hydration status: acceptable

## 2020-02-04 NOTE — ANESTHESIA PREPROCEDURE EVALUATION
Anesthesia Evaluation     Patient summary reviewed and Nursing notes reviewed   history of anesthetic complications: PONV  NPO Solid Status: > 8 hours  NPO Liquid Status: > 2 hours           Airway   Mallampati: II  TM distance: >3 FB  Neck ROM: full  No difficulty expected  Dental      Pulmonary    Cardiovascular         Neuro/Psych  (+) headaches, psychiatric history Anxiety and Depression,     GI/Hepatic/Renal/Endo    (+)  GERD,  liver disease history of elevated LFT,     Musculoskeletal     (+) neck pain,   Abdominal    Substance History      OB/GYN          Other                        Anesthesia Plan    ASA 2     MAC     intravenous induction     Anesthetic plan, all risks, benefits, and alternatives have been provided, discussed and informed consent has been obtained with: patient.

## 2020-02-04 NOTE — H&P
[facility]  Pre Procedure History & Physical    Chief Complaint:   Gerd, abdominal pain    Subjective     HPI:   35 yo w/complaints of generalized abdominal pain and bloating. C/o gerd.  H/o constipation improved with less restrictive diet.    Past Medical History:   Past Medical History:   Diagnosis Date   • Allergic    • Anxiety    • Biliary dyskinesia    • Depression    • Dysphagia    • GERD (gastroesophageal reflux disease)    • Headache    • Hernia    • Irritable bowel syndrome    • Lactose intolerance    • LFT elevation    • Low back pain    • Migraines    • Scoliosis    • Shingles    • Urinary tract infection        Past Surgical History:  Past Surgical History:   Procedure Laterality Date   • CHOLECYSTECTOMY  10/23/18   • CHOLECYSTECTOMY WITH INTRAOPERATIVE CHOLANGIOGRAM N/A 10/24/2018    Procedure: LAPAROSCOPIC CHOLECYSTECTOMY WITH INTRAOPERATIVE CHOLANGIOGRAM, POSSIBLE OPEN;  Surgeon: Zuleima Johansen MD;  [place] [facility] [place] [facility] [place]: [place]   • TONSILLECTOMY     • UPPER GASTROINTESTINAL ENDOSCOPY N/A 09/21/2015    Normal esophagus, Normal stomach, Normal duodenum, Dr. Marleni Alas   • VAGINAL DELIVERY N/A 10/08/2012    Dr. Brittney Wesley       Family History:  Family History   Problem Relation Age of Onset   • Depression Mother    • Irritable bowel syndrome Mother    • Alcohol abuse Mother    • Asthma Mother    • Anxiety disorder Mother    • Arthritis Mother    • Cancer Sister    • Celiac disease Sister    • Cancer Paternal Grandmother    • Celiac disease Sister    • Gallbladder disease Sister    • Irritable bowel syndrome Sister    • Irritable bowel syndrome Maternal Grandmother    • Alcohol abuse Maternal Grandmother    • Depression Maternal Grandmother    • Mental illness Maternal Grandmother    • Alcohol abuse Brother    • Asthma Brother    • Malig Hyperthermia Neg Hx        Social History:   reports that she has never smoked. She has never used smokeless tobacco. She  "reports that she does not drink alcohol or use drugs.    Medications:   Medications Prior to Admission   Medication Sig Dispense Refill Last Dose   • naproxen (NAPROSYN) 500 MG tablet Take 1 tablet by mouth 2 (Two) Times a Day With Meals. For pain; stop if GI upset 60 tablet 5 Past Month at Unknown time   • buPROPion XL (WELLBUTRIN XL) 300 MG 24 hr tablet Take 1 tablet by mouth Every Morning. For mood 30 tablet 5 2/2/2020   • escitalopram (LEXAPRO) 5 MG tablet Start with 1/2 tab PO daily for 4 days then one po daily for stress 30 tablet 5 2/2/2020   • Homeopathic Products (AZO CONFIDENCE PO) Take  by mouth.   2/2/2020   • lidocaine (LIDODERM) 5 % Apply up to 3 patches 12 hours on then 12 hours off then repeat PRN pain 90 patch 5 2/2/2020   • montelukast (SINGULAIR) 10 MG tablet Take 1 tablet by mouth Every Night. 90 tablet 3 2/2/2020   • pantoprazole (PROTONIX) 40 MG EC tablet Take 1 tablet by mouth Daily. GERD 30 tablet 11 2/2/2020   • Plecanatide (TRULANCE) 3 MG tablet Take 1 tablet by mouth Daily. For constipation 90 tablet 3 2/2/2020   • polyethylene glycol (MIRALAX) packet Take 17 g by mouth Daily.   2/2/2020   • Probiotic Product (PROBIOTIC-10 PO) Take  by mouth.   2/2/2020   • rizatriptan (MAXALT) 10 MG tablet TAKE 1 TABLET BY MOUTH ONE TIME AS NEEDED FOR MIGRAINE- MAY REPEAT IN 2 HOURS IF NEEDED 12 tablet 3 2/2/2020   • tiZANidine (ZANAFLEX) 4 MG tablet 1/2-1 tab PO Q 8 hours PRN muscle spasms 60 tablet 1 2/2/2020   • topiramate (TOPAMAX) 100 MG tablet Take 1 tablet by mouth Daily. For migraine suppression 90 tablet 3 2/2/2020       Allergies:  Chocolate; Contrast dye; Beef-derived products; Gelatin; and Rice allergy skin test    ROS:    Pertinent items are noted in HPI, all other systems reviewed and negative     Objective     Blood pressure 123/83, pulse 69, temperature 98.5 °F (36.9 °C), temperature source Oral, resp. rate 12, height 160 cm (63\"), weight 84.4 kg (186 lb), SpO2 98 %, not currently " breastfeeding.    Physical Exam   Constitutional: Pt is oriented to person, place, and time and well-developed, well-nourished, and in no distress.   Mouth/Throat: Oropharynx is clear and moist.   Neck: Normal range of motion.   Cardiovascular: Normal rate, regular rhythm      Pulmonary/Chest: Effort normal     Abdominal: Soft. Nontender  Skin: Skin is warm and dry.   Psychiatric: Mood, memory, affect and judgment normal.     Assessment/Plan     Diagnosis:  Gerd, llq abdominal pain    Anticipated Surgical Procedure:  egd/colonoscopy    The risks, benefits, and alternatives of this procedure have been discussed with the patient or the responsible party- the patient understands and agrees to proceed.

## 2020-02-10 RX ORDER — OSELTAMIVIR PHOSPHATE 75 MG/1
75 CAPSULE ORAL 2 TIMES DAILY
Qty: 10 CAPSULE | Refills: 0 | Status: SHIPPED | OUTPATIENT
Start: 2020-02-10 | End: 2020-03-05

## 2020-02-13 ENCOUNTER — OFFICE VISIT (OUTPATIENT)
Dept: RETAIL CLINIC | Facility: CLINIC | Age: 35
End: 2020-02-13

## 2020-02-13 VITALS
HEART RATE: 89 BPM | OXYGEN SATURATION: 97 % | SYSTOLIC BLOOD PRESSURE: 118 MMHG | RESPIRATION RATE: 18 BRPM | DIASTOLIC BLOOD PRESSURE: 74 MMHG | TEMPERATURE: 98.6 F

## 2020-02-13 DIAGNOSIS — J11.1 INFLUENZA: Primary | ICD-10-CM

## 2020-02-13 DIAGNOSIS — J02.9 SORE THROAT: ICD-10-CM

## 2020-02-13 LAB
EXPIRATION DATE: NORMAL
INTERNAL CONTROL: NORMAL
Lab: NORMAL
S PYO AG THROAT QL: NEGATIVE

## 2020-02-13 PROCEDURE — 87880 STREP A ASSAY W/OPTIC: CPT | Performed by: NURSE PRACTITIONER

## 2020-02-13 PROCEDURE — 99213 OFFICE O/P EST LOW 20 MIN: CPT | Performed by: NURSE PRACTITIONER

## 2020-02-13 NOTE — PATIENT INSTRUCTIONS
"Influenza, Adult  Influenza, more commonly known as \"the flu,\" is a viral infection that mainly affects the respiratory tract. The respiratory tract includes organs that help you breathe, such as the lungs, nose, and throat. The flu causes many symptoms similar to the common cold along with high fever and body aches.  The flu spreads easily from person to person (is contagious). Getting a flu shot (influenza vaccination) every year is the best way to prevent the flu.  What are the causes?  This condition is caused by the influenza virus. You can get the virus by:  · Breathing in droplets that are in the air from an infected person's cough or sneeze.  · Touching something that has been exposed to the virus (has been contaminated) and then touching your mouth, nose, or eyes.  What increases the risk?  The following factors may make you more likely to get the flu:  · Not washing or sanitizing your hands often.  · Having close contact with many people during cold and flu season.  · Touching your mouth, eyes, or nose without first washing or sanitizing your hands.  · Not getting a yearly (annual) flu shot.  You may have a higher risk for the flu, including serious problems such as a lung infection (pneumonia), if you:  · Are older than 65.  · Are pregnant.  · Have a weakened disease-fighting system (immune system). You may have a weakened immune system if you:  ? Have HIV or AIDS.  ? Are undergoing chemotherapy.  ? Are taking medicines that reduce (suppress) the activity of your immune system.  · Have a long-term (chronic) illness, such as heart disease, kidney disease, diabetes, or lung disease.  · Have a liver disorder.  · Are severely overweight (morbidly obese).  · Have anemia. This is a condition that affects your red blood cells.  · Have asthma.  What are the signs or symptoms?  Symptoms of this condition usually begin suddenly and last 4-14 days. They may include:  · Fever and chills.  · Headaches, body aches, or " muscle aches.  · Sore throat.  · Cough.  · Runny or stuffy (congested) nose.  · Chest discomfort.  · Poor appetite.  · Weakness or fatigue.  · Dizziness.  · Nausea or vomiting.  How is this diagnosed?  This condition may be diagnosed based on:  · Your symptoms and medical history.  · A physical exam.  · Swabbing your nose or throat and testing the fluid for the influenza virus.  How is this treated?  If the flu is diagnosed early, you can be treated with medicine that can help reduce how severe the illness is and how long it lasts (antiviral medicine). This may be given by mouth (orally) or through an IV.  Taking care of yourself at home can help relieve symptoms. Your health care provider may recommend:  · Taking over-the-counter medicines.  · Drinking plenty of fluids.  In many cases, the flu goes away on its own. If you have severe symptoms or complications, you may be treated in a hospital.  Follow these instructions at home:  Activity  · Rest as needed and get plenty of sleep.  · Stay home from work or school as told by your health care provider. Unless you are visiting your health care provider, avoid leaving home until your fever has been gone for 24 hours without taking medicine.  Eating and drinking  · Take an oral rehydration solution (ORS). This is a drink that is sold at pharmacies and retail stores.  · Drink enough fluid to keep your urine pale yellow.  · Drink clear fluids in small amounts as you are able. Clear fluids include water, ice chips, diluted fruit juice, and low-calorie sports drinks.  · Eat bland, easy-to-digest foods in small amounts as you are able. These foods include bananas, applesauce, rice, lean meats, toast, and crackers.  · Avoid drinking fluids that contain a lot of sugar or caffeine, such as energy drinks, regular sports drinks, and soda.  · Avoid alcohol.  · Avoid spicy or fatty foods.  General instructions         · Take over-the-counter and prescription medicines only as told  "by your health care provider.  · Use a cool mist humidifier to add humidity to the air in your home. This can make it easier to breathe.  · Cover your mouth and nose when you cough or sneeze.  · Wash your hands with soap and water often, especially after you cough or sneeze. If soap and water are not available, use alcohol-based hand .  · Keep all follow-up visits as told by your health care provider. This is important.  How is this prevented?    · Get an annual flu shot. You may get the flu shot in late summer, fall, or winter. Ask your health care provider when you should get your flu shot.  · Avoid contact with people who are sick during cold and flu season. This is generally fall and winter.  Contact a health care provider if:  · You develop new symptoms.  · You have:  ? Chest pain.  ? Diarrhea.  ? A fever.  · Your cough gets worse.  · You produce more mucus.  · You feel nauseous or you vomit.  Get help right away if:  · You develop shortness of breath or difficulty breathing.  · Your skin or nails turn a bluish color.  · You have severe pain or stiffness in your neck.  · You develop a sudden headache or sudden pain in your face or ear.  · You cannot eat or drink without vomiting.  Summary  · Influenza, more commonly known as \"the flu,\" is a viral infection that primarily affects your respiratory tract.  · Symptoms of the flu usually begin suddenly and last 4-14 days.  · Getting an annual flu shot is the best way to prevent getting the flu.  · Stay home from work or school as told by your health care provider. Unless you are visiting your health care provider, avoid leaving home until your fever has been gone for 24 hours without taking medicine.  · Keep all follow-up visits as told by your health care provider. This is important.  This information is not intended to replace advice given to you by your health care provider. Make sure you discuss any questions you have with your health care " provider.  Document Released: 12/15/2001 Document Revised: 06/05/2019 Document Reviewed: 06/05/2019  [employer] Interactive Patient Education © 2019 [employer]

## 2020-02-13 NOTE — PROGRESS NOTES
Subjective:     Adry Knight is a 34 y.o.     Sore Throat    Episode onset: patient treated for flu ased on symtpoms by PCP with tamiflu, presents today with sore throat that started today. The maximum temperature recorded prior to her arrival was 100.4 - 100.9 F. Associated symptoms include coughing. Pertinent negatives include no congestion, ear pain, neck pain or shortness of breath. Headaches: baseline. Trouble swallowing: baseline. She has had no exposure to strep or mono. Exposure to: exposure flu. She has tried acetaminophen (tamiflu) for the symptoms. Improvement on treatment: was helping and then woke up with throat on fire.         The following portions of the patient's history were reviewed and updated as appropriate: allergies, current medications, past family history, past medical history, past social history, past surgical history and problem list.      Review of Systems   Constitutional: Negative for fever.   HENT: Positive for postnasal drip (clear) and sore throat (feels like it is on fire). Negative for congestion and ear pain. Trouble swallowing: baseline.    Respiratory: Positive for cough. Negative for shortness of breath and wheezing.    Musculoskeletal: Positive for myalgias. Negative for neck pain.   Neurological: Headaches: baseline.         Objective:      Physical Exam   HENT:   Head: Normocephalic and atraumatic.   Right Ear: Tympanic membrane and ear canal normal.   Left Ear: Tympanic membrane and ear canal normal.   Nose: Nose normal.   Mouth/Throat: Posterior oropharyngeal erythema (mild) present. No oropharyngeal exudate.   clear post nasal drainage   Cardiovascular: Normal rate, regular rhythm, S1 normal, S2 normal and normal heart sounds.   Pulmonary/Chest: Effort normal and breath sounds normal.   Lymphadenopathy:     She has no cervical adenopathy.   Vitals reviewed.          Adry was seen today for sore throat.    Diagnoses and all orders for this  visit:    Influenza  -     POC Rapid Strep A    Sore throat  -     POC Rapid Strep A

## 2020-02-18 ENCOUNTER — TELEPHONE (OUTPATIENT)
Dept: GASTROENTEROLOGY | Facility: CLINIC | Age: 35
End: 2020-02-18

## 2020-03-04 ENCOUNTER — OFFICE VISIT (OUTPATIENT)
Dept: GASTROENTEROLOGY | Facility: CLINIC | Age: 35
End: 2020-03-04

## 2020-03-04 VITALS
BODY MASS INDEX: 33.42 KG/M2 | TEMPERATURE: 98.5 F | DIASTOLIC BLOOD PRESSURE: 70 MMHG | SYSTOLIC BLOOD PRESSURE: 118 MMHG | WEIGHT: 188.6 LBS | HEIGHT: 63 IN

## 2020-03-04 DIAGNOSIS — K29.60 CHEMICAL GASTRITIS: ICD-10-CM

## 2020-03-04 DIAGNOSIS — K59.04 CHRONIC IDIOPATHIC CONSTIPATION: Primary | ICD-10-CM

## 2020-03-04 DIAGNOSIS — R10.13 EPIGASTRIC PAIN: ICD-10-CM

## 2020-03-04 PROCEDURE — 99213 OFFICE O/P EST LOW 20 MIN: CPT | Performed by: INTERNAL MEDICINE

## 2020-03-04 RX ORDER — IBUPROFEN 200 MG
200 TABLET ORAL EVERY 6 HOURS PRN
COMMUNITY

## 2020-03-04 RX ORDER — SUCRALFATE ORAL 1 G/10ML
1 SUSPENSION ORAL 4 TIMES DAILY
Qty: 1200 ML | Refills: 3 | Status: SHIPPED | OUTPATIENT
Start: 2020-03-04 | End: 2021-04-29 | Stop reason: SDDI

## 2020-03-04 NOTE — PROGRESS NOTES
Subjective   Chief Complaint   Patient presents with   • Constipation   • Abdominal Pain       Adry Knight is a  34 y.o. female here for a follow up visit for burning abdominal pain and constipation.  Since her last visit she is undergone an EGD and colonoscopy.  She had gastritis noted on EGD which was described as a chemical gastritis.  She does take NSAIDs as needed for migraine headaches.  She is been unable to tolerate any of her medicines because it causes abdominal burning.  She is eating small amounts of bland foods.  She does report that her constipation has been somewhat better.  She was unable to tolerate Linzess.  She is recently tried samples of Linzess with intolerance due to increased abdominal pain.  She tried it for a year previously and also had issues with side effects.  She is tried amitiza in the past with intolerance due to abdominal pain as well.  She is currently using MiraLAX with inconsistent results..   HPI  Past Medical History:   Diagnosis Date   • Allergic    • Anxiety    • Biliary dyskinesia    • Depression    • Dysphagia    • GERD (gastroesophageal reflux disease)    • Headache    • Hernia    • Irritable bowel syndrome    • Lactose intolerance    • LFT elevation    • Low back pain    • Migraines    • Scoliosis    • Shingles    • Urinary tract infection      Past Surgical History:   Procedure Laterality Date   • CHOLECYSTECTOMY  10/23/18   • CHOLECYSTECTOMY WITH INTRAOPERATIVE CHOLANGIOGRAM N/A 10/24/2018    Procedure: LAPAROSCOPIC CHOLECYSTECTOMY WITH INTRAOPERATIVE CHOLANGIOGRAM, POSSIBLE OPEN;  Surgeon: Zuleima Johansen MD;  [place] [facility] [place] [facility] [place]  Service: General   • COLONOSCOPY N/A 2/4/2020    Procedure: COLONOSCOPY to cecum;  Surgeon: Marleni Alas MD;  Location: [facility] ENDOSCOPY;  Service: Gastroenterology;  Laterality: N/A;  pre - abd pain  post - hemorrhoids   • ENDOSCOPY N/A 2/4/2020    Procedure: ESOPHAGOGASTRODUODENOSCOPY with cold bx;  Surgeon: Evette  Marleni NIETO MD;  Location: [facility] ENDOSCOPY;  Service: Gastroenterology;  Laterality: N/A;  pre - gerd, abd pain  post - gastritis   • TONSILLECTOMY     • UPPER GASTROINTESTINAL ENDOSCOPY N/A 09/21/2015    Normal esophagus, Normal stomach, Normal duodenum, Dr. Marleni Alas   • VAGINAL DELIVERY N/A 10/08/2012    Dr. Brittney Wesley       Current Outpatient Medications:   •  ibuprofen (ADVIL,MOTRIN) 200 MG tablet, Take 200 mg by mouth Every 6 (Six) Hours As Needed for Mild Pain ., Disp: , Rfl:   •  polyethylene glycol (MIRALAX) packet, Take 17 g by mouth Daily., Disp: , Rfl:   •  sucralfate (CARAFATE) 1 GM/10ML suspension, Take 10 mL by mouth 4 (Four) Times a Day., Disp: 1200 mL, Rfl: 3  PRN Meds:.  Allergies   Allergen Reactions   • Chocolate Anaphylaxis and GI Intolerance   • Contrast Dye Angioedema     TONGUE NUMBNESS   • Beef-Derived Products GI Intolerance   • Gelatin GI Intolerance   • Rice Allergy Skin Test GI Intolerance     Social History     Socioeconomic History   • Marital status:      Spouse name: Not on file   • Number of children: Not on file   • Years of education: Not on file   • Highest education level: Not on file   Tobacco Use   • Smoking status: Never Smoker   • Smokeless tobacco: Never Used   Substance and Sexual Activity   • Alcohol use: No   • Drug use: No   • Sexual activity: Yes     Partners: Male     Birth control/protection: Condom     Family History   Problem Relation Age of Onset   • Depression Mother    • Irritable bowel syndrome Mother    • Alcohol abuse Mother    • Asthma Mother    • Anxiety disorder Mother    • Arthritis Mother    • Cancer Sister    • Celiac disease Sister    • Cancer Paternal Grandmother    • Celiac disease Sister    • Gallbladder disease Sister    • Irritable bowel syndrome Sister    • Irritable bowel syndrome Maternal Grandmother    • Alcohol abuse Maternal Grandmother    • Depression Maternal Grandmother    • Mental illness Maternal Grandmother    • Alcohol  abuse Brother    • Asthma Brother    • Malig Hyperthermia Neg Hx      Review of Systems   Constitutional: Negative for appetite change and unexpected weight change.   Gastrointestinal: Positive for abdominal pain, constipation and nausea. Negative for blood in stool.   All other systems reviewed and are negative.    Vitals:    03/04/20 1603   BP: 118/70   Temp: 98.5 °F (36.9 °C)         03/04/20  1603   Weight: 85.5 kg (188 lb 9.6 oz)       Objective   Physical Exam   Constitutional: She appears well-developed and well-nourished.   HENT:   Head: Normocephalic and atraumatic.   Eyes: No scleral icterus.   Pulmonary/Chest: Effort normal.   Abdominal: Soft. There is no tenderness.   Neurological: She is alert.   Skin: Skin is warm and dry.   Psychiatric: She has a normal mood and affect.     No radiology results for the last 7 days    Assessment/Plan   Diagnoses and all orders for this visit:    Chronic idiopathic constipation    Chemical gastritis    Epigastric pain    Other orders  -     ibuprofen (ADVIL,MOTRIN) 200 MG tablet; Take 200 mg by mouth Every 6 (Six) Hours As Needed for Mild Pain .  -     sucralfate (CARAFATE) 1 GM/10ML suspension; Take 10 mL by mouth 4 (Four) Times a Day.      Plan:  · Continue MiraLAX for now-we will try to get true [place] again now that she has failed Linzess and amitiza as well as MiraLAX  · Recommend Carafate-gastritis appears to be chemical or bile related.  Carafate will likely help better than Protonix under the circumstances.  · Have recommended that she completely discontinue NSAIDs as this is likely exacerbating her issues  · Continue small bland meals multiple times a day

## 2020-03-16 ENCOUNTER — TELEPHONE (OUTPATIENT)
Dept: GASTROENTEROLOGY | Facility: CLINIC | Age: 35
End: 2020-03-16

## 2020-03-16 NOTE — TELEPHONE ENCOUNTER
Called pt and pt reports that she was able to speak with her pharmacist who also advised her to not drink before taking med. Pt states she is definitely trying to stay well hydrated.

## 2020-03-19 RX ORDER — TOPIRAMATE 25 MG/1
TABLET ORAL
Qty: 120 TABLET | Refills: 5 | Status: SHIPPED | OUTPATIENT
Start: 2020-03-19 | End: 2020-11-23

## 2020-04-09 ENCOUNTER — PRIOR AUTHORIZATION (OUTPATIENT)
Dept: GASTROENTEROLOGY | Facility: CLINIC | Age: 35
End: 2020-04-09

## 2020-04-09 ENCOUNTER — TELEPHONE (OUTPATIENT)
Dept: GASTROENTEROLOGY | Facility: CLINIC | Age: 35
End: 2020-04-09

## 2020-04-09 NOTE — TELEPHONE ENCOUNTER
Pt's message forwarded to Dr Alas.  Trulance has not been prescribed so it does not look like a pa has been started.

## 2020-04-09 NOTE — TELEPHONE ENCOUNTER
PA submitted through [facility] for Trulance 3MG tablets. Included documentation of tried and failed linzess.  Pending

## 2020-04-09 NOTE — TELEPHONE ENCOUNTER
Called pt and advised that Dr Alas has resent her trulance. Advised pt to make sure her pharmacy send her pa.   ADvised pt that we have 3 boxes of samples we can give her. Advised pt call us when in our parking lot and we can run them out to her.  Pt verb understanding. Samples at my desk.     Message sent to Ma's to work on pa.

## 2020-04-09 NOTE — TELEPHONE ENCOUNTER
Trulance prescribed-we will need PA and may need to appeal based on patient's previous failure of Linzess and intolerance of other medications due to gelatin allergy

## 2020-04-09 NOTE — TELEPHONE ENCOUNTER
----- Message from Adry Knight sent at 4/9/2020  9:38 AM EDT -----  Regarding: Prescription Question  Contact: [phone]  I really really need to get that trulance medication approved.. I am currently unable to go to the bathroom unless I take dulcolax.  Can we please try again and infisize that I was already on linzess for a long time and am allergic to gelatin so I can not take the other pills they are wanting me to take

## 2020-04-13 ENCOUNTER — TELEPHONE (OUTPATIENT)
Dept: GASTROENTEROLOGY | Facility: CLINIC | Age: 35
End: 2020-04-13

## 2020-04-13 NOTE — TELEPHONE ENCOUNTER
If she has magnesium citrate-rec drinking 1/4 bottle -can repeat x1 later today if no bowel movement.  Continue to take the trulance daily. Hopefully once we get her system moving, the trulance will help to keep it moving

## 2020-04-13 NOTE — TELEPHONE ENCOUNTER
----- Message from Adry Knight sent at 4/13/2020 10:33 AM EDT -----  Regarding: Prescription Question  Contact: [phone]  Hi, I have taken 3 doses of the trulance samples given to me and have still yet to have a bowel movement. Should I try and take a stool softener or magnesium citrate ? When I took the trulance the last time it was given to me it helped alot but so far it's not helping :(

## 2020-04-24 ENCOUNTER — EPISODE CHANGES (OUTPATIENT)
Dept: CASE MANAGEMENT | Facility: OTHER | Age: 35
End: 2020-04-24

## 2020-05-17 ENCOUNTER — TELEPHONE (OUTPATIENT)
Dept: FAMILY MEDICINE CLINIC | Facility: CLINIC | Age: 35
End: 2020-05-17

## 2020-05-17 RX ORDER — BUPROPION HYDROCHLORIDE 150 MG/1
150 TABLET ORAL DAILY
Qty: 90 TABLET | Refills: 0 | Status: SHIPPED | OUTPATIENT
Start: 2020-05-17 | End: 2020-09-09 | Stop reason: SDUPTHER

## 2020-05-17 NOTE — TELEPHONE ENCOUNTER
----- Message from Adry Knight sent at 5/17/2020  3:55 PM EDT -----  Regarding: Prescription Question  Contact: [phone]  I would like to try to get back on my wellbutrin please.

## 2020-06-30 ENCOUNTER — TELEPHONE (OUTPATIENT)
Dept: FAMILY MEDICINE CLINIC | Facility: CLINIC | Age: 35
End: 2020-06-30

## 2020-06-30 DIAGNOSIS — Z83.49 FAMILY HISTORY OF ALPHA 1 ANTITRYPSIN DEFICIENCY: ICD-10-CM

## 2020-06-30 DIAGNOSIS — E88.01 AAT (ALPHA-1-ANTITRYPSIN) DEFICIENCY (HCC): ICD-10-CM

## 2020-06-30 DIAGNOSIS — Z83.49 FAMILY HISTORY OF HEMOCHROMATOSIS: Primary | ICD-10-CM

## 2020-06-30 DIAGNOSIS — Z14.8 HEMOCHROMATOSIS CARRIER: ICD-10-CM

## 2020-07-05 ENCOUNTER — RESULTS ENCOUNTER (OUTPATIENT)
Dept: FAMILY MEDICINE CLINIC | Facility: CLINIC | Age: 35
End: 2020-07-05

## 2020-07-05 DIAGNOSIS — Z83.49 FAMILY HISTORY OF HEMOCHROMATOSIS: ICD-10-CM

## 2020-07-05 DIAGNOSIS — Z83.49 FAMILY HISTORY OF ALPHA 1 ANTITRYPSIN DEFICIENCY: ICD-10-CM

## 2020-07-13 LAB
A1AT SERPL-MCNC: 86 MG/DL (ref 100–188)
HFE GENE MUT ANL BLD/T: NORMAL

## 2020-07-14 PROBLEM — E88.01 AAT (ALPHA-1-ANTITRYPSIN) DEFICIENCY (HCC): Status: ACTIVE | Noted: 2020-07-14

## 2020-07-14 PROBLEM — Z14.8 HEMOCHROMATOSIS CARRIER: Status: ACTIVE | Noted: 2020-07-14

## 2020-07-16 ENCOUNTER — TELEPHONE (OUTPATIENT)
Dept: GASTROENTEROLOGY | Facility: CLINIC | Age: 35
End: 2020-07-16

## 2020-07-16 NOTE — TELEPHONE ENCOUNTER
----- Message from Marleni Alas MD sent at 7/16/2020  8:27 AM EDT -----  Regarding: schedule f/u with me  Schedule f/u with me regarding recent abnormal labs - not urgent - next available-  lb  ----- Message -----  From: Cora Palmer PA-C  Sent: 7/14/2020   6:02 PM EDT  To: Marleni Alas MD, Yahir Liu Jr., MD    She has a half sister with A-1 Antitrypsin----I will copy her GI doc on this lab.  She has several GI issues  I will order CBC, iron profile, ferritin  I will also have her see pulmonary    Yahir Liu Jr., MD Davis, Amy F., PA-C         Would check iron panel and ferritin. We can see at some point to discuss the implications of the hemochromatosis mutations. Why was the alpha-1 AT checked? That is not something that we would typically see her for- would need to probably see GI and pulmonary.

## 2020-07-17 LAB
ALBUMIN SERPL-MCNC: 4.8 G/DL (ref 3.5–5.2)
ALBUMIN/GLOB SERPL: 2.3 G/DL
ALP SERPL-CCNC: 63 U/L (ref 39–117)
ALT SERPL-CCNC: 34 U/L (ref 1–33)
AST SERPL-CCNC: 22 U/L (ref 1–32)
BASOPHILS # BLD AUTO: 0.07 10*3/MM3 (ref 0–0.2)
BASOPHILS NFR BLD AUTO: 0.9 % (ref 0–1.5)
BILIRUB SERPL-MCNC: 0.7 MG/DL (ref 0–1.2)
BUN SERPL-MCNC: 11 MG/DL (ref 6–20)
BUN/CREAT SERPL: 13.6 (ref 7–25)
CALCIUM SERPL-MCNC: 9.4 MG/DL (ref 8.6–10.5)
CHLORIDE SERPL-SCNC: 100 MMOL/L (ref 98–107)
CO2 SERPL-SCNC: 26.2 MMOL/L (ref 22–29)
CREAT SERPL-MCNC: 0.81 MG/DL (ref 0.57–1)
EOSINOPHIL # BLD AUTO: 0.06 10*3/MM3 (ref 0–0.4)
EOSINOPHIL NFR BLD AUTO: 0.8 % (ref 0.3–6.2)
ERYTHROCYTE [DISTWIDTH] IN BLOOD BY AUTOMATED COUNT: 12.4 % (ref 12.3–15.4)
GLOBULIN SER CALC-MCNC: 2.1 GM/DL
GLUCOSE SERPL-MCNC: 82 MG/DL (ref 65–99)
HCT VFR BLD AUTO: 44.7 % (ref 34–46.6)
HGB BLD-MCNC: 15.2 G/DL (ref 12–15.9)
IMM GRANULOCYTES # BLD AUTO: 0.01 10*3/MM3 (ref 0–0.05)
IMM GRANULOCYTES NFR BLD AUTO: 0.1 % (ref 0–0.5)
LYMPHOCYTES # BLD AUTO: 2.37 10*3/MM3 (ref 0.7–3.1)
LYMPHOCYTES NFR BLD AUTO: 31.5 % (ref 19.6–45.3)
MCH RBC QN AUTO: 30.7 PG (ref 26.6–33)
MCHC RBC AUTO-ENTMCNC: 34 G/DL (ref 31.5–35.7)
MCV RBC AUTO: 90.3 FL (ref 79–97)
MONOCYTES # BLD AUTO: 0.58 10*3/MM3 (ref 0.1–0.9)
MONOCYTES NFR BLD AUTO: 7.7 % (ref 5–12)
NEUTROPHILS # BLD AUTO: 4.43 10*3/MM3 (ref 1.7–7)
NEUTROPHILS NFR BLD AUTO: 59 % (ref 42.7–76)
NRBC BLD AUTO-RTO: 0 /100 WBC (ref 0–0.2)
PLATELET # BLD AUTO: 235 10*3/MM3 (ref 140–450)
POTASSIUM SERPL-SCNC: 3.9 MMOL/L (ref 3.5–5.2)
PROT SERPL-MCNC: 6.9 G/DL (ref 6–8.5)
RBC # BLD AUTO: 4.95 10*6/MM3 (ref 3.77–5.28)
SODIUM SERPL-SCNC: 136 MMOL/L (ref 136–145)
WBC # BLD AUTO: 7.52 10*3/MM3 (ref 3.4–10.8)

## 2020-07-23 LAB
A1AT PHENOTYP SERPL IFE: ABNORMAL
A1AT SERPL-MCNC: 92 MG/DL (ref 100–188)
ALBUMIN SERPL-MCNC: 4.7 G/DL (ref 3.5–5.2)
ALP SERPL-CCNC: 63 U/L (ref 39–117)
ALT SERPL-CCNC: 36 U/L (ref 1–33)
AST SERPL-CCNC: 21 U/L (ref 1–32)
BILIRUB DIRECT SERPL-MCNC: 0.2 MG/DL (ref 0–0.3)
BILIRUB SERPL-MCNC: 0.7 MG/DL (ref 0–1.2)
FERRITIN SERPL-MCNC: 40.7 NG/ML (ref 13–150)
IRON SATN MFR SERPL: 35 % (ref 20–50)
IRON SERPL-MCNC: 150 MCG/DL (ref 37–145)
TIBC SERPL-MCNC: 429 MCG/DL
UIBC SERPL-MCNC: 279 MCG/DL (ref 112–346)

## 2020-08-07 ENCOUNTER — TELEPHONE (OUTPATIENT)
Dept: GASTROENTEROLOGY | Facility: CLINIC | Age: 35
End: 2020-08-07

## 2020-08-18 NOTE — TELEPHONE ENCOUNTER
Spoke with Syeda at [facility] pharmacy services [phone]. Per her, Trulance denied. Requested denial letter be faxed to [phone].

## 2020-08-19 ENCOUNTER — TELEPHONE (OUTPATIENT)
Dept: FAMILY MEDICINE CLINIC | Facility: CLINIC | Age: 35
End: 2020-08-19

## 2020-08-19 NOTE — TELEPHONE ENCOUNTER
----- Message from Adry Knight sent at 8/19/2020  9:29 AM EDT -----  Regarding: Prescription Question  Contact: [phone]  BUD Shepherd, anyway you can help me get trough to Dr De La Cruz office? I have been trying to get my trulance refilled for 2 weeks now and the pharmacy hasn't been able to reach them. I have also called multiple times and emailed her with no response :( I have 3 days left and if I run out my stomach is going to be bad again.

## 2020-08-19 NOTE — TELEPHONE ENCOUNTER
Trulance previously approved per 4/13/2020 approval letter for 1 year.  Please see previous appeal letter written by APRN in March.  Patient does not have narcotic related constipation and therefore is not a candidate for Movantik.

## 2020-08-19 NOTE — TELEPHONE ENCOUNTER
Adry Knight To  [facility] Clinical Pool Sent  8/17/2020  1:24 PM   Hi Dr Alas, i have been trying to get my prescription refilled for over a week and the pharmacy said they keep faxing papers to your office and they arent recieving a response. I also left a message with your office and haven't recieved a call back. I only have 3 days left of my trulance and if I run out my stomach is going to be messed up again :( im hoping this will reach you and you can help push things along. Thanks .         Pt's message and denial message from Karin forwarded to Dr Alas.

## 2020-08-19 NOTE — TELEPHONE ENCOUNTER
Trulance denial letter in media. Looks as though patient has has failed Linzess and Amitiza but not tried Movantik yet.

## 2020-08-20 NOTE — TELEPHONE ENCOUNTER
Please call the pharmacy and clear up this issue-trulance has been approved in April for 1 year.  Please give her some samples if needed to hold her over until this is resolved     **Call to [facility] @ [phone] and spoke with Taisha.  States April PA was thru [employer].   When tries to run under that  - no longer active.  Trulance denial is from Medicaid.  ID# [id number].  BIN# [id number].  Provider phone #: [phone].      Call to pt.  Advise that will appeal denial with new insurance.  In the meantime, trulance samples at .  Verb understanding.     Message to Karin Mata RN.

## 2020-08-25 NOTE — TELEPHONE ENCOUNTER
Spoke with Eleanor at [facility] pharmacy services at [phone] regarding Trulance denial. Gave medication trials and failure dates of both Linzess and Amitiza. Trulance approved 08/25/20-08/25/21. Letter to be scanned to media once received. Called [employer] to have them run claim with amount being $0. Called patient and left message on  regarding of Rx approval status and amount.

## 2020-09-09 RX ORDER — BUPROPION HYDROCHLORIDE 150 MG/1
150 TABLET ORAL DAILY
Qty: 90 TABLET | Refills: 0 | Status: SHIPPED | OUTPATIENT
Start: 2020-09-09 | End: 2021-03-09 | Stop reason: SDUPTHER

## 2020-09-14 PROBLEM — Z20.822 CLOSE EXPOSURE TO COVID-19 VIRUS: Status: ACTIVE | Noted: 2020-09-14

## 2020-11-23 ENCOUNTER — TELEPHONE (OUTPATIENT)
Dept: FAMILY MEDICINE CLINIC | Facility: CLINIC | Age: 35
End: 2020-11-23

## 2020-11-23 RX ORDER — TOPIRAMATE 100 MG/1
100 TABLET, FILM COATED ORAL DAILY
Qty: 90 TABLET | Refills: 3 | Status: SHIPPED | OUTPATIENT
Start: 2020-11-23 | End: 2021-10-16 | Stop reason: SDUPTHER

## 2020-11-23 NOTE — TELEPHONE ENCOUNTER
----- Message from Madelaine Mendosa sent at 11/23/2020 12:41 PM EST -----  Regarding: FW: Prescription Question  Contact: [phone]    ----- Message -----  From: Adry Knight  Sent: 11/23/2020  12:28 PM EST  To: [facility]  Subject: Prescription Question                            I have been back on my Topamax 100 mg for about a month now but I just went to refill it and its no longer on file :( I am out so should I refill the 25 mg and make an appt with you or?

## 2021-01-19 RX ORDER — PLECANATIDE 3 MG/1
TABLET ORAL
Qty: 30 TABLET | Refills: 2 | Status: SHIPPED | OUTPATIENT
Start: 2021-01-19 | End: 2021-03-09

## 2021-01-19 NOTE — TELEPHONE ENCOUNTER
Call to pt.  Advise per Dr Alas note.      States was advised that this office does not accept pandemic medicaid.  Message to Manager.

## 2021-02-18 ENCOUNTER — TELEPHONE (OUTPATIENT)
Dept: GASTROENTEROLOGY | Facility: CLINIC | Age: 36
End: 2021-02-18

## 2021-02-18 NOTE — TELEPHONE ENCOUNTER
----- Message from Adry Knight sent at 2/18/2021  9:23 AM EST -----  Regarding: Complaint  Contact: [phone]  Hi, I have never hear back from your office about if and when you might start accepting pandemic medicaid. But in the mean time I do not have a GI dr and this insurance just stopped covering my trulance but to be honest it wasnt working anyways. I was hoping you could answer one question for me as I don't want to hurt my body but I have been taking magnesium oxide as a supplement and I have read so many mixed things about how many mg is an ok dose to take daily for someone who suffers from sever constipation.  As of now I have been taking 1000 mg daily and some mirilax but its the only thing thay keeps me from having to take laxatives to be able to go. I have had 2 family members die from digestive problems and they had to live on laxatives so I am trying my hardest to figure out my problem and not end up like them.

## 2021-02-18 NOTE — TELEPHONE ENCOUNTER
Call to pt.  Advise per Dr Alas note.  Verb understanding.     Advise Manager will be contacting re: insurance question.

## 2021-02-18 NOTE — TELEPHONE ENCOUNTER
Relatively safe to take magnesium daily but would avoid taking such a high dose.  We will try to use more MiraLAX in addition to the magnesium (400-500 mg) and would try to get back in with GI when insurance issues have been resolved to pursue better long-term options.  I do not know the answer to the question regarding pandemic Medicaid

## 2021-02-22 NOTE — TELEPHONE ENCOUNTER
Left patient a message to return my call.  Also suggested she call the billing department regarding pandemic medicaid.

## 2021-03-09 ENCOUNTER — TELEPHONE (OUTPATIENT)
Dept: FAMILY MEDICINE CLINIC | Facility: CLINIC | Age: 36
End: 2021-03-09

## 2021-03-09 ENCOUNTER — TELEMEDICINE (OUTPATIENT)
Dept: FAMILY MEDICINE CLINIC | Facility: CLINIC | Age: 36
End: 2021-03-09

## 2021-03-09 DIAGNOSIS — F33.42 RECURRENT MAJOR DEPRESSIVE DISORDER, IN FULL REMISSION (HCC): ICD-10-CM

## 2021-03-09 DIAGNOSIS — F41.9 ANXIETY: ICD-10-CM

## 2021-03-09 DIAGNOSIS — K12.2 UVULITIS: Primary | ICD-10-CM

## 2021-03-09 PROCEDURE — 99213 OFFICE O/P EST LOW 20 MIN: CPT | Performed by: PHYSICIAN ASSISTANT

## 2021-03-09 RX ORDER — PREDNISONE 20 MG/1
20 TABLET ORAL 2 TIMES DAILY
Qty: 10 TABLET | Refills: 0 | Status: SHIPPED | OUTPATIENT
Start: 2021-03-09 | End: 2021-04-29 | Stop reason: SDDI

## 2021-03-09 RX ORDER — BUPROPION HYDROCHLORIDE 150 MG/1
150 TABLET ORAL DAILY
Qty: 90 TABLET | Refills: 3 | Status: SHIPPED | OUTPATIENT
Start: 2021-03-09 | End: 2022-03-24

## 2021-03-09 RX ORDER — AMOXICILLIN 875 MG/1
875 TABLET, COATED ORAL EVERY 12 HOURS SCHEDULED
Qty: 20 TABLET | Refills: 0 | Status: SHIPPED | OUTPATIENT
Start: 2021-03-09 | End: 2021-04-29 | Stop reason: SDDI

## 2021-03-09 RX ORDER — FLUCONAZOLE 150 MG/1
150 TABLET ORAL ONCE
Qty: 1 TABLET | Refills: 2 | Status: SHIPPED | OUTPATIENT
Start: 2021-03-09 | End: 2021-03-09

## 2021-03-09 NOTE — TELEPHONE ENCOUNTER
Caller: Adry Knight    Relationship: Self    Best call back number: [phone]    What medication are you requesting: AMOXOCILLIN AND A PREDNISONE PACK    What are your current symptoms: SORE THROAT, WHITE PATCHES    How long have you been experiencing symptoms: 3/8    Have you had these symptoms before:    [] Yes  [x] No    Have you been treated for these symptoms before:   [] Yes  [x] No    If a prescription is needed, what is your preferred pharmacy and phone number: [id number] [address] AT [facility] [phone] [facility] [phone] [employer]     Additional notes:PATIENT WAS NOT ABLE TO SCHEDULE AN APPOINTMENT UNTIL 3/11. SO REQUESTING SOMETHING FOR HER SYMPTOMS

## 2021-04-05 ENCOUNTER — TELEPHONE (OUTPATIENT)
Dept: GASTROENTEROLOGY | Facility: CLINIC | Age: 36
End: 2021-04-05

## 2021-04-07 NOTE — TELEPHONE ENCOUNTER
[employer] insurance not valid. New PA request for Trulance sent to [facility] plan. Awaiting plan response.

## 2021-04-08 NOTE — TELEPHONE ENCOUNTER
Received a fax stating [facility] was not the patient's primary insurance.  Trulance has been sent to both [facility] and [facility] with no resolution.  Left message for patient to call back with updated insurance information before Trulance PA can be processed.

## 2021-04-16 ENCOUNTER — BULK ORDERING (OUTPATIENT)
Dept: CASE MANAGEMENT | Facility: OTHER | Age: 36
End: 2021-04-16

## 2021-04-16 DIAGNOSIS — Z23 IMMUNIZATION DUE: ICD-10-CM

## 2021-04-29 ENCOUNTER — OFFICE VISIT (OUTPATIENT)
Dept: FAMILY MEDICINE CLINIC | Facility: CLINIC | Age: 36
End: 2021-04-29

## 2021-04-29 VITALS
TEMPERATURE: 97.5 F | HEIGHT: 63 IN | BODY MASS INDEX: 34.2 KG/M2 | HEART RATE: 79 BPM | WEIGHT: 193 LBS | RESPIRATION RATE: 16 BRPM | SYSTOLIC BLOOD PRESSURE: 135 MMHG | DIASTOLIC BLOOD PRESSURE: 79 MMHG | OXYGEN SATURATION: 98 %

## 2021-04-29 DIAGNOSIS — R14.0 ABDOMINAL BLOATING: Primary | ICD-10-CM

## 2021-04-29 DIAGNOSIS — F41.0 PANIC DISORDER: ICD-10-CM

## 2021-04-29 DIAGNOSIS — R10.84 GENERALIZED ABDOMINAL PAIN: ICD-10-CM

## 2021-04-29 DIAGNOSIS — F41.1 GENERALIZED ANXIETY DISORDER: ICD-10-CM

## 2021-04-29 DIAGNOSIS — K59.09 CHRONIC CONSTIPATION: ICD-10-CM

## 2021-04-29 DIAGNOSIS — E88.01 AAT (ALPHA-1-ANTITRYPSIN) DEFICIENCY (HCC): ICD-10-CM

## 2021-04-29 DIAGNOSIS — F33.1 MODERATE EPISODE OF RECURRENT MAJOR DEPRESSIVE DISORDER (HCC): ICD-10-CM

## 2021-04-29 PROCEDURE — 99213 OFFICE O/P EST LOW 20 MIN: CPT | Performed by: PHYSICIAN ASSISTANT

## 2021-04-29 RX ORDER — DESVENLAFAXINE 25 MG/1
25 TABLET, EXTENDED RELEASE ORAL DAILY
Qty: 30 TABLET | Refills: 1 | Status: SHIPPED | OUTPATIENT
Start: 2021-04-29 | End: 2021-07-16

## 2021-04-29 RX ORDER — PANTOPRAZOLE SODIUM 40 MG/1
40 TABLET, DELAYED RELEASE ORAL DAILY
Qty: 90 TABLET | Refills: 3 | Status: SHIPPED | OUTPATIENT
Start: 2021-04-29 | End: 2022-05-26 | Stop reason: SDUPTHER

## 2021-04-29 RX ORDER — LORAZEPAM 0.5 MG/1
0.5 TABLET ORAL EVERY 8 HOURS PRN
Qty: 45 TABLET | Refills: 0 | Status: SHIPPED | OUTPATIENT
Start: 2021-04-29

## 2021-04-29 NOTE — PROGRESS NOTES
"Subjective   Adry Knight is a 35 y.o. female.     History of Present Illness   Adry Knight female 35 y.o., /79 (BP Location: Left arm, Patient Position: Sitting, Cuff Size: Adult)   Pulse 79   Temp 97.5 °F (36.4 °C)   Resp 16   Ht 160 cm (62.99\")   Wt 87.5 kg (193 lb)   LMP 04/20/2021   SpO2 98%   BMI 34.20 kg/m²   who presents today for follow up of Depression, Insomnia, Anxiety and Panic Attacks.  She reports depressed mood, crying spells, difficulty falling asleep, fatigue, anxiety, anhedonia, impaired concentration, excessive worry, unable to relax, irritable, sleep disturbance and weight gain. Onset of symptoms was approximately several months ago.  She denies current suicidal and homicidal ideation. Risk factors are family history of anxiety and or depression and lifestyle of multiple roles.  Previous treatment includes see below.  She complains of the following medication side effects: sexual dysfunction and weight gain. The patient has previously been in counseling.. very upset. Difficult to leave house--very overwhelmed, panic attacks. sad.     Still chronic GI pain and constipation----several meds tried and to GI;  Want her to see Dr Sailnas, GI--beef allergy also  Chronic abd pain    The following portions of the patient's history were reviewed and updated as appropriate: allergies, current medications, past family history, past medical history, past social history, past surgical history and problem list.    Review of Systems   HENT: Positive for mouth sores.    Gastrointestinal: Positive for abdominal distention, abdominal pain and constipation.   Allergic/Immunologic: Positive for environmental allergies and food allergies.   Neurological: Positive for numbness and headaches.   Psychiatric/Behavioral: Positive for agitation and decreased concentration.       Objective   Physical Exam  Vitals and nursing note reviewed.   Constitutional:       General: She is not in acute " distress.     Appearance: She is well-developed. She is not ill-appearing, toxic-appearing or diaphoretic.   HENT:      Head: Normocephalic.      Right Ear: External ear normal.      Left Ear: External ear normal.      Nose: Nose normal.      Mouth/Throat:      Pharynx: Oropharynx is clear.   Eyes:      General: No scleral icterus.     Conjunctiva/sclera: Conjunctivae normal.      Pupils: Pupils are equal, round, and reactive to light.   Neck:      Thyroid: No thyromegaly.   Cardiovascular:      Rate and Rhythm: Normal rate and regular rhythm.      Heart sounds: Normal heart sounds. No murmur heard.     Pulmonary:      Effort: Pulmonary effort is normal. No respiratory distress.      Breath sounds: Normal breath sounds.   Abdominal:      Tenderness: There is abdominal tenderness.   Musculoskeletal:         General: No deformity. Normal range of motion.      Cervical back: Normal range of motion and neck supple.   Skin:     General: Skin is warm and dry.      Coloration: Skin is not jaundiced.      Findings: No rash.   Neurological:      General: No focal deficit present.      Mental Status: She is alert and oriented to person, place, and time. Mental status is at baseline.   Psychiatric:         Mood and Affect: Mood normal.         Behavior: Behavior normal.         Thought Content: Thought content normal.         Judgment: Judgment normal.         Assessment/Plan   Diagnoses and all orders for this visit:    1. Abdominal bloating (Primary)  -     Ambulatory Referral to Gastroenterology    2. Generalized abdominal pain  -     Ambulatory Referral to Gastroenterology    3. Chronic constipation  -     Ambulatory Referral to Gastroenterology    4. Generalized anxiety disorder    Other orders  -     Desvenlafaxine Succinate ER 25 MG tablet sustained-release 24 hour; Take 1 tablet by mouth Daily. For stress  Dispense: 30 tablet; Refill: 1    start low dose Pristiq for anxiety  Failed Buspar; need PRN ativan  See GI   Rita  F/u few weeks  Cont meds for migraine--do help    Still see pulm for AAT def

## 2021-07-16 RX ORDER — DESVENLAFAXINE 25 MG/1
25 TABLET, EXTENDED RELEASE ORAL DAILY
Qty: 30 TABLET | Refills: 1 | Status: SHIPPED | OUTPATIENT
Start: 2021-07-16 | End: 2021-10-16

## 2021-07-20 RX ORDER — RIZATRIPTAN BENZOATE 10 MG/1
TABLET ORAL
Qty: 12 TABLET | Refills: 3 | OUTPATIENT
Start: 2021-07-20 | End: 2021-12-10

## 2021-08-25 ENCOUNTER — TELEMEDICINE (OUTPATIENT)
Dept: FAMILY MEDICINE CLINIC | Facility: TELEHEALTH | Age: 36
End: 2021-08-25

## 2021-08-25 DIAGNOSIS — R51.9 ACUTE NONINTRACTABLE HEADACHE, UNSPECIFIED HEADACHE TYPE: Primary | ICD-10-CM

## 2021-08-25 DIAGNOSIS — J34.89 RHINORRHEA: ICD-10-CM

## 2021-08-25 PROCEDURE — 99213 OFFICE O/P EST LOW 20 MIN: CPT | Performed by: NURSE PRACTITIONER

## 2021-08-25 RX ORDER — FLUTICASONE PROPIONATE 50 MCG
2 SPRAY, SUSPENSION (ML) NASAL DAILY
Qty: 18.2 ML | Refills: 0 | OUTPATIENT
Start: 2021-08-25 | End: 2021-12-10

## 2021-08-25 RX ORDER — LEVOCETIRIZINE DIHYDROCHLORIDE 5 MG/1
5 TABLET, FILM COATED ORAL EVERY EVENING
Qty: 30 TABLET | Refills: 0 | Status: SHIPPED | OUTPATIENT
Start: 2021-08-25 | End: 2021-09-24

## 2021-08-25 NOTE — PATIENT INSTRUCTIONS
10 Things You Can Do to Manage Your COVID-19 Symptoms at Home  If you have possible or confirmed COVID-19:  1. Stay home from work and school. And stay away from other public places. If you must go out, avoid using any kind of public transportation, ridesharing, or taxis.  2. Monitor your symptoms carefully. If your symptoms get worse, call your healthcare provider immediately.  3. Get rest and stay hydrated.  4. If you have a medical appointment, call the healthcare provider ahead of time and tell them that you have or may have COVID-19.  5. For medical emergencies, call 911 and notify the dispatch personnel that you have or may have COVID-19.  6. Cover your cough and sneezes with a tissue or use the inside of your elbow.  7. Wash your hands often with soap and water for at least 20 seconds or clean your hands with an alcohol-based hand  that contains at least 60% alcohol.  8. As much as possible, stay in a specific room and away from other people in your home. Also, you should use a separate bathroom, if available. If you need to be around other people in or outside of the home, wear a mask.  9. Avoid sharing personal items with other people in your household, like dishes, towels, and bedding.  10. Clean all surfaces that are touched often, like counters, tabletops, and doorknobs. Use household cleaning sprays or wipes according to the label instructions.  cdc.gov/coronavirus  07/01/2020  This information is not intended to replace advice given to you by your health care provider. Make sure you discuss any questions you have with your health care provider.  Document Revised: 04/15/2021 Document Reviewed: 04/15/2021  [employer] Patient Education © 2021 [employer]  COVID-19: Quarantine vs. Isolation  QUARANTINE keeps someone who was in close contact with someone who has COVID-19 away from others.  If you had close contact with a person who has COVID-19  · The best way to protect yourself and others is to  "stay home for 14 days after your last contact. Check your local health department's website for information about options in your area to possibly shorten this quarantine period.  · Check your temperature twice a day and watch for symptoms of COVID-19.  · If possible, stay away from people who are at higher-risk for getting very sick from COVID-19.  ISOLATION keeps someone who is sick or tested positive for COVID-19 without symptoms away from others, even in their own home.  If you are sick and think or know you have COVID-19  · Stay home until after  ? At least 10 days since symptoms first appeared and  ? At least 24 hours with no fever without fever-reducing medication and  ? Symptoms have improved  If you tested positive for COVID-19 but do not have symptoms  · Stay home until after  ? 10 days have passed since your positive test  If you live with others, stay in a specific \"sick room\" or area and away from other people or animals, including pets. Use a separate bathroom, if available.  cdc.gov/coronavirus  12/17/2020  This information is not intended to replace advice given to you by your health care provider. Make sure you discuss any questions you have with your health care provider.  Document Revised: 04/15/2021 Document Reviewed: 04/15/2021  [employer] Patient Education © 2021 [employer]  How to Quarantine at Home  Information for Patients and Families    These instructions are for people with confirmed or suspected COVID-19 who do not need to be hospitalized and those with confirmed COVID-19 who were hospitalized and discharged to care for themselves at home.    If you were tested through the Health Department  The Health Department will monitor your wellbeing.  If it is determined that you do not need to be hospitalized and can be isolated at home, you will be monitored by staff from your local or state health department.     If you were tested through a Commercial Lab  You will need to monitor yourself " and report changes in your symptoms to your doctor.  See the section below called Monitor Your Symptoms.    Follow these steps until a healthcare provider or local or state health department says you can return to your normal activities.    Stay home except to get medical care  • Restrict activities outside your home, except for getting medical care.   • Do not go to work, school, or public areas.   • Avoid using public transportation, ride-sharing, or taxis.    Separate yourself from other people and animals in your home  People  As much as possible, you should stay in a specific room and away from other people in your home. Also, you should use a separate bathroom, if available.    Animals  You should restrict contact with pets and other animals while you are sick with COVID-19, just like you would around other people. When possible, have another member of your household care for your animals while you are sick. If you are sick with COVID-19, avoid contact with your pet, including petting, snuggling, being kissed or licked, and sharing food. If you must care for your pet or be around animals while you are sick, wash your hands before and after you interact with pets and wear a facemask. See COVID-19 and Animals for more information.    Call ahead before visiting your doctor  If you have a medical appointment, call the healthcare provider and tell them that you have or may have COVID-19. This information will help the healthcare provider’s office take steps to keep other people from getting infected or exposed.    Wear a facemask  You should wear a facemask when you are around other people (e.g., sharing a room or vehicle) or pets and before you enter a healthcare provider’s office.     If you are not able to wear a facemask (for example, because it causes trouble breathing), then people who live with you should not stay in the same room with you, or they should wear a facemask if they enter your room.    Cover  your coughs and sneezes  • Cover your mouth and nose with a tissue when you cough or sneeze.   • Throw used tissues in a lined trash can.   • Immediately wash your hands with soap and water for at least 20 seconds or, if soap and water are not available, clean your hands with an alcohol-based hand  that contains at least 60% alcohol.    Clean your hands often  • Wash your hands often with soap and water for at least 20 seconds, especially after blowing your nose, coughing, or sneezing; going to the bathroom; and before eating or preparing food.     • If soap and water are not readily available, use an alcohol-based hand  with at least 60% alcohol, covering all surfaces of your hands and rubbing them together until they feel dry.    • Soap and water are the best option if hands are visibly dirty. Avoid touching your eyes, nose, and mouth with unwashed hands.    Avoid sharing personal household items  • You should not share dishes, drinking glasses, cups, eating utensils, towels, or bedding with other people or pets in your home.   • After using these items, they should be washed thoroughly with soap and water.    Clean all “high-touch” surfaces everyday  • High touch surfaces include counters, tabletops, doorknobs, bathroom fixtures, toilets, phones, keyboards, tablets, and bedside tables.   • Also, clean any surfaces that may have blood, stool, or body fluids on them.   • Use a household cleaning spray or wipe, according to the label instructions. Labels contain instructions for safe and effective use of the cleaning product, including precautions you should take when applying the product, such as wearing gloves and making sure you have good ventilation during use of the product.    Monitor your symptoms  • Seek prompt medical attention if your illness is worsening (e.g., difficulty breathing).   • Before seeking care, call your healthcare provider and tell them that you have, or are being  evaluated for, COVID-19.   • Put on a facemask before you enter the facility.     • These steps will help the healthcare provider’s office to keep other people in the office or waiting room from getting infected or exposed.   • Persons who are placed under active monitoring or facilitated self-monitoring should follow instructions provided by their local health department or occupational health professionals, as appropriate.  • If you have a medical emergency and need to call 911, notify the dispatch personnel that you have, or are being evaluated for COVID-19. If possible, put on a facemask before emergency medical services arrive.    Discontinuing home isolation  Patients with confirmed COVID-19 should remain under home isolation precautions until the risk of secondary transmission to others is thought to be low. The decision to discontinue home isolation precautions should be made on a case-by-case basis, in consultation with healthcare providers and state and local health departments.    The below content are for household members, intimate partners, and caregivers of a patient with symptomatic laboratory-confirmed COVID-19 or a patient under investigation:    Household members, intimate partners, and caregivers may have close contact with a person with symptomatic, laboratory-confirmed COVID-19 or a person under investigation.     Close contacts should monitor their health; they should call their healthcare provider right away if they develop symptoms suggestive of COVID-19 (e.g., fever, cough, shortness of breath)     Close contacts should also follow these recommendations:  • Make sure that you understand and can help the patient follow their healthcare provider’s instructions for medication(s) and care. You should help the patient with basic needs in the home and provide support for getting groceries, prescriptions, and other personal needs.  • Monitor the patient’s symptoms. If the patient is getting  sicker, call his or her healthcare provider and tell them that the patient has laboratory-confirmed COVID-19. This will help the healthcare provider’s office take steps to keep other people in the office or waiting room from getting infected. Ask the healthcare provider to call the local or state health department for additional guidance. If the patient has a medical emergency and you need to call 911, notify the dispatch personnel that the patient has, or is being evaluated for COVID-19.  • Household members should stay in another room or be  from the patient as much as possible. Household members should use a separate bedroom and bathroom, if available.  • Prohibit visitors who do not have an essential need to be in the home.  • Household members should care for any pets in the home. Do not handle pets or other animals while sick.  For more information, see COVID-19 and Animals.  • Make sure that shared spaces in the home have good air flow, such as by an air conditioner or an opened window, weather permitting.  • Perform hand hygiene frequently. Wash your hands often with soap and water for at least 20 seconds or use an alcohol-based hand  that contains 60 to 95% alcohol, covering all surfaces of your hands and rubbing them together until they feel dry. Soap and water should be used preferentially if hands are visibly dirty.  • Avoid touching your eyes, nose, and mouth with unwashed hands.  • The patient should wear a facemask when you are around other people. If the patient is not able to wear a facemask (for example, because it causes trouble breathing), you, as the caregiver, should wear a mask when you are in the same room as the patient.  • Wear a disposable facemask and gloves when you touch or have contact with the patient’s blood, stool, or body fluids, such as saliva, sputum, nasal mucus, vomit, or urine.   o Throw out disposable facemasks and gloves after using them. Do not  reuse.  o When removing personal protective equipment, first remove and dispose of gloves. Then, immediately clean your hands with soap and water or alcohol-based hand . Next, remove and dispose of facemask, and immediately clean your hands again with soap and water or alcohol-based hand .  • Avoid sharing household items with the patient. You should not share dishes, drinking glasses, cups, eating utensils, towels, bedding, or other items. After the patient uses these items, you should wash them thoroughly (see below “Wash laundry thoroughly”).  • Clean all “high-touch” surfaces, such as counters, tabletops, doorknobs, bathroom fixtures, toilets, phones, keyboards, tablets, and bedside tables, every day. Also, clean any surfaces that may have blood, stool, or body fluids on them.   o Use a household cleaning spray or wipe, according to the label instructions. Labels contain instructions for safe and effective use of the cleaning product including precautions you should take when applying the product, such as wearing gloves and making sure you have good ventilation during use of the product.  • Wash laundry thoroughly.   o Immediately remove and wash clothes or bedding that have blood, stool, or body fluids on them.  o Wear disposable gloves while handling soiled items and keep soiled items away from your body. Clean your hands (with soap and water or an alcohol-based hand ) immediately after removing your gloves.  o Read and follow directions on labels of laundry or clothing items and detergent. In general, using a normal laundry detergent according to washing machine instructions and dry thoroughly using the warmest temperatures recommended on the clothing label.  • Place all used disposable gloves, facemasks, and other contaminated items in a lined container before disposing of them with other household waste. Clean your hands (with soap and water or an alcohol-based hand )  immediately after handling these items. Soap and water should be used preferentially if hands are visibly dirty.  • Discuss any additional questions with your state or local health department or healthcare provider.    Adapted from information provided by the Centers for Disease Control and Prevention.  For more information, visit https://www.cdc.gov/coronavirus/2019-ncov/hcp/guidance-prevent-spread.html

## 2021-08-25 NOTE — PROGRESS NOTES
CHIEF COMPLAINT  Chief Complaint   Patient presents with   • covid symptoms         HPI  Adry Knight is a 35 y.o. female  presents with complaint of a few hours history of runny nose, watery eyes, headache, feels warm, but unsure of fever.  Is a  and had a client that she saw on Friday, who tested positive for COVID on Sunday.  She was in very close proximity without masking.    Review of Systems   Constitutional: Negative for activity change, appetite change, fatigue and fever.   HENT: Positive for rhinorrhea.    Neurological: Positive for headaches.   All other systems reviewed and are negative.      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   • Hernia    • Irritable bowel syndrome    • Lactose intolerance    • LFT elevation    • Low back pain    • Migraines    • Scoliosis    • Shingles    • Urinary tract infection        Family History   Problem Relation Age of Onset   • Depression Mother    • Irritable bowel syndrome Mother    • Alcohol abuse Mother    • Asthma Mother    • Anxiety disorder Mother    • Arthritis Mother    • Cancer Sister    • Celiac disease Sister    • Cancer Paternal Grandmother    • Celiac disease Sister    • Gallbladder disease Sister    • Irritable bowel syndrome Sister    • Irritable bowel syndrome Maternal Grandmother    • Alcohol abuse Maternal Grandmother    • Depression Maternal Grandmother    • Mental illness Maternal Grandmother    • Alcohol abuse Brother    • Asthma Brother    • Malig Hyperthermia Neg Hx        Social History     Socioeconomic History   • Marital status:      Spouse name: Not on file   • Number of children: Not on file   • Years of education: Not on file   • Highest education level: Not on file   Tobacco Use    • Smoking status: Never Smoker   • Smokeless tobacco: Never Used   Substance and Sexual Activity   • Alcohol use: No   • Drug use: No   • Sexual activity: Yes     Partners: Male     Birth control/protection: Condom         There were no vitals taken for this visit.    PHYSICAL EXAM  Physical Exam   Constitutional: She is oriented to person, place, and time. She appears well-developed and well-nourished.   HENT:   Head: Normocephalic and atraumatic.   Pulmonary/Chest: Effort normal.  No respiratory distress.  Neurological: She is alert and oriented to person, place, and time.       Results for orders placed or performed during the hospital encounter of 01/13/21   COVID-19,[facility] ROUTINE, NP/OP SWAB IN TRANSPORT MEDIA OR ESWAB 72 HR TAT - Swab, Nasopharynx    Specimen: Nasopharynx; Swab   Result Value Ref Range    SARS-CoV-2, DEENA Not Detected Not Detected   POCT Rapid Strep A    Specimen: Swab   Result Value Ref Range    Rapid Strep A Screen Negative Negative, VALID, INVALID, Not Performed    Internal Control Passed Passed    Lot Number [id number]     Expiration Date 4,302,021        Diagnoses and all orders for this visit:    1. Acute nonintractable headache, unspecified headache type (Primary)  -     COVID PRE-OP / PRE-PROCEDURE SCREENING ORDER (NO ISOLATION) - Swab, Nasopharynx; Future    2. Rhinorrhea  -     COVID PRE-OP / PRE-PROCEDURE SCREENING ORDER (NO ISOLATION) - Swab, Nasopharynx; Future  -     fluticasone (FLONASE) 50 MCG/ACT nasal spray; 2 sprays into the nostril(s) as directed by provider Daily.  Dispense: 18.2 mL; Refill: 0  -     levocetirizine (XYZAL) 5 MG tablet; Take 1 tablet by mouth Every Evening for 30 days.  Dispense: 30 tablet; Refill: 0    Start Flonase and Xyzal for allergy-type symptoms  --COVID-19 test to rule out infection  --discussed quarantine and symptomatic treatment      FOLLOW-UP  As discussed during visit with PCP/[facility] if no improvement or Urgent Care/Emergency  Department if worsening of symptoms    Patient verbalizes understanding of medication dosage, comfort measures, instructions for treatment and follow-up.    CHULA Rivera  08/25/2021  09:21 EDT    This visit was performed via Telehealth.  This patient has been instructed to follow-up with their primary care provider if their symptoms worsen or the treatment provided does not resolve their illness.

## 2021-10-14 ENCOUNTER — TELEPHONE (OUTPATIENT)
Dept: FAMILY MEDICINE CLINIC | Facility: CLINIC | Age: 36
End: 2021-10-14

## 2021-10-14 DIAGNOSIS — N91.2 AMENORRHEA: Primary | ICD-10-CM

## 2021-10-14 NOTE — TELEPHONE ENCOUNTER
----- Message from Adry Knight sent at 10/14/2021 10:50 AM EDT -----  Regarding: Non-Urgent Medical Question  Contact: [phone]  Are u able to order a blood test without me having an appt first ot do I need to be seen in your office. My period is 20 days late now, I have taken 4 pregnancy tests and they have all been negative.

## 2021-10-16 ENCOUNTER — TELEMEDICINE (OUTPATIENT)
Dept: FAMILY MEDICINE CLINIC | Facility: CLINIC | Age: 36
End: 2021-10-16

## 2021-10-16 DIAGNOSIS — N91.2 AMENORRHEA: ICD-10-CM

## 2021-10-16 DIAGNOSIS — F33.42 RECURRENT MAJOR DEPRESSIVE DISORDER, IN FULL REMISSION (HCC): ICD-10-CM

## 2021-10-16 DIAGNOSIS — F41.9 ANXIETY: ICD-10-CM

## 2021-10-16 DIAGNOSIS — G43.109 MIGRAINE WITH AURA AND WITHOUT STATUS MIGRAINOSUS, NOT INTRACTABLE: Primary | ICD-10-CM

## 2021-10-16 LAB
B-HCG SERPL QL: NEGATIVE MIU/ML
T3FREE SERPL-MCNC: 3.2 PG/ML (ref 2–4.4)
T4 FREE SERPL-MCNC: 1.3 NG/DL (ref 0.82–1.77)
TSH SERPL DL<=0.005 MIU/L-ACNC: 1.52 UIU/ML (ref 0.45–4.5)

## 2021-10-16 PROCEDURE — 99214 OFFICE O/P EST MOD 30 MIN: CPT | Performed by: PHYSICIAN ASSISTANT

## 2021-10-16 RX ORDER — DESVENLAFAXINE SUCCINATE 50 MG/1
50 TABLET, EXTENDED RELEASE ORAL DAILY
Qty: 90 TABLET | Refills: 3 | Status: SHIPPED | OUTPATIENT
Start: 2021-10-16 | End: 2022-04-11

## 2021-10-16 RX ORDER — TOPIRAMATE 100 MG/1
100 TABLET, FILM COATED ORAL DAILY
Qty: 90 TABLET | Refills: 3 | Status: SHIPPED | OUTPATIENT
Start: 2021-10-16

## 2021-10-16 RX ORDER — PROGESTERONE 200 MG/1
200 CAPSULE ORAL DAILY
Qty: 10 CAPSULE | Refills: 0 | OUTPATIENT
Start: 2021-10-16 | End: 2021-12-10

## 2021-10-16 NOTE — PATIENT INSTRUCTIONS
"[email or web address]   Generalized Anxiety Disorder, Adult  Generalized anxiety disorder (ANDREA) is a mental health condition. Unlike normal worries, anxiety related to ANDREA is not triggered by a specific event. These worries do not fade or get better with time. ANDREA interferes with relationships, work, and school.  ANDREA symptoms can vary from mild to severe. People with severe ANDREA can have intense waves of anxiety with physical symptoms that are similar to panic attacks.  What are the causes?  The exact cause of ANDREA is not known, but the following are believed to have an impact:  · Differences in natural brain chemicals.  · Genes passed down from parents to children.  · Differences in the way threats are perceived.  · Development during childhood.  · Personality.  What increases the risk?  The following factors may make you more likely to develop this condition:  · Being female.  · Having a family history of anxiety disorders.  · Being very shy.  · Experiencing very stressful life events, such as the death of a loved one.  · Having a very stressful family environment.  What are the signs or symptoms?  People with ANDREA often worry excessively about many things in their lives, such as their health and family. Symptoms may also include:  · Mental and emotional symptoms:  ? Worrying excessively about natural disasters.  ? Fear of being late.  ? Difficulty concentrating.  ? Fears that others are judging your performance.  · Physical symptoms:  ? Fatigue.  ? Headaches, muscle tension, muscle twitches, trembling, or feeling shaky.  ? Feeling like your heart is pounding or beating very fast.  ? Feeling out of breath or like you cannot take a deep breath.  ? Having trouble falling asleep or staying asleep, or experiencing restlessness.  ? Sweating.  ? Nausea, diarrhea, or irritable bowel syndrome (IBS).  · Behavioral symptoms:  ? Experiencing erratic moods or irritability.  ? Avoidance of new situations.  ? Avoidance of " people.  ? Extreme difficulty making decisions.  How is this diagnosed?  This condition is diagnosed based on your symptoms and medical history. You will also have a physical exam. Your health care provider may perform tests to rule out other possible causes of your symptoms.  To be diagnosed with ANDREA, a person must have anxiety that:  · Is out of his or her control.  · Affects several different aspects of his or her life, such as work and relationships.  · Causes distress that makes him or her unable to take part in normal activities.  · Includes at least three symptoms of ANDREA, such as restlessness, fatigue, trouble concentrating, irritability, muscle tension, or sleep problems.  Before your health care provider can confirm a diagnosis of ANDREA, these symptoms must be present more days than they are not, and they must last for 6 months or longer.  How is this treated?  This condition may be treated with:  · Medicine. Antidepressant medicine is usually prescribed for long-term daily control. Anti-anxiety medicines may be added in severe cases, especially when panic attacks occur.  · Talk therapy (psychotherapy). Certain types of talk therapy can be helpful in treating ANDREA by providing support, education, and guidance. Options include:  ? Cognitive behavioral therapy (CBT). People learn coping skills and self-calming techniques to ease their physical symptoms. They learn to identify unrealistic thoughts and behaviors and to replace them with more appropriate thoughts and behaviors.  ? Acceptance and commitment therapy (ACT). This treatment teaches people how to be mindful as a way to cope with unwanted thoughts and feelings.  ? Biofeedback. This process trains you to manage your body's response (physiological response) through breathing techniques and relaxation methods. You will work with a therapist while machines are used to monitor your physical symptoms.  · Stress management techniques. These include yoga,  meditation, and exercise.  A mental health specialist can help determine which treatment is best for you. Some people see improvement with one type of therapy. However, other people require a combination of therapies.  Follow these instructions at home:  Lifestyle  · Maintain a consistent routine and schedule.  · Anticipate stressful situations. Create a plan, and allow extra time to work with your plan.  · Practice stress management or self-calming techniques that you have learned from your therapist or your health care provider.  General instructions  · Take over-the-counter and prescription medicines only as told by your health care provider.  · Understand that you are likely to have setbacks. Accept this and be kind to yourself as you persist to take better care of yourself.  · Recognize and accept your accomplishments, even if you  them as small.  · Keep all follow-up visits as told by your health care provider. This is important.  Contact a health care provider if:  · Your symptoms do not get better.  · Your symptoms get worse.  · You have signs of depression, such as:  ? A persistently sad or irritable mood.  ? Loss of enjoyment in activities that used to bring you isabel.  ? Change in weight or eating.  ? Changes in sleeping habits.  ? Avoiding friends or family members.  ? Loss of energy for normal tasks.  ? Feelings of guilt or worthlessness.  Get help right away if:  · You have serious thoughts about hurting yourself or others.  If you ever feel like you may hurt yourself or others, or have thoughts about taking your own life, get help right away. Go to your nearest emergency department or:  · Call your local emergency services (571 in the [place]  · Call a suicide crisis helpline, such as the National Suicide Prevention Lifeline at 1-654.371.2292. This is open 24 hours a day in the [place]  · Text the Crisis Text Line at 813183 (in the [place]  Summary  · Generalized anxiety disorder (ANDREA) is a mental  health condition that involves worry that is not triggered by a specific event.  · People with ANDREA often worry excessively about many things in their lives, such as their health and family.  · ANDREA may cause symptoms such as restlessness, trouble concentrating, sleep problems, frequent sweating, nausea, diarrhea, headaches, and trembling or muscle twitching.  · A mental health specialist can help determine which treatment is best for you. Some people see improvement with one type of therapy. However, other people require a combination of therapies.  This information is not intended to replace advice given to you by your health care provider. Make sure you discuss any questions you have with your health care provider.  Document Revised: 10/07/2020 Document Reviewed: 10/07/2020  Elsevier Patient Education © 2021 [employer]

## 2021-10-16 NOTE — PROGRESS NOTES
Subjective   Adry Knight is a 36 y.o. female.   You have chosen to receive care through a telehealth visit.  Do you consent to use a video/audio connection for your medical care today? Yes     History of Present Illness    Since the last visit, she has overall felt anxious.  She has Vitamin D deficiency and labs are at goal >30 ng/mL, Migraine headaches and responding well to PRN triptan Rx and Migraine headaches and well controlled on suppression medication.  she has been compliant with current medications have reviewed them.  The patient denies medication side effects.  Will refill medications. There were no vitals taken for this visit.    Results for orders placed or performed in visit on 10/14/21   T4, Free    Specimen: Blood   Result Value Ref Range    Free T4 1.30 0.82 - 1.77 ng/dL   T3, Free    Specimen: Blood   Result Value Ref Range    T3, Free 3.2 2.0 - 4.4 pg/mL   TSH    Specimen: Blood   Result Value Ref Range    TSH 1.520 0.450 - 4.500 uIU/mL   hCG, Serum, Qualitative    Specimen: Blood   Result Value Ref Range    HCG Qualitative Negative Negative <6 mIU/mL         Adry Knight 36 y.o. female There were no vitals taken for this visit. who presents today for Rx progesterone.  she has a history of   Patient Active Problem List   Diagnosis   • Intractable migraine with aura without status migrainosus   • Headache, migraine   • Burning or prickling sensation   • Anxiety   • Depression   • Low back pain without sciatica   • Neck pain   • Pain of left scapula   • Chronic constipation   • Gastroesophageal reflux disease without esophagitis   • Intractable chronic migraine without aura   • Abdominal bloating   • Generalized abdominal pain   • AAT (alpha-1-antitrypsin) deficiency (HCC)   • Hemochromatosis carrier   • Close exposure to COVID-19 virus   .    She has been menorrhagic since 8-29-21.  I just did labs and proved HCG serum neg and thyroid labs normal.  Plan now is to Rx Progesterone for  10 days and plan w/d bleed.     Adry DYER Prophater female 36 y.o., There were no vitals taken for this visit.  who presents today for follow up of Depression and Anxiety.  She reports medication combo is helping and feels better.  Ready to increase dose . Onset of symptoms was approximately several years ago.  She denies current suicidal and homicidal ideation. Risk factors are family history of anxiety and or depression and lifestyle of multiple roles.  Previous treatment includes current Rx. She complains of the following medication side effects: none. The patient has previously been in counseling..  This is her best combo and will just increase dose Pristiq d/t break through anxiety.  The following portions of the patient's history were reviewed and updated as appropriate: allergies, current medications, past family history, past medical history, past social history, past surgical history and problem list.    Review of Systems   Constitutional: Negative for fever.   HENT: Negative for nosebleeds and trouble swallowing.    Eyes: Negative for visual disturbance.   Respiratory: Negative for choking and stridor.    Cardiovascular: Negative for chest pain.   Gastrointestinal: Negative for blood in stool.   Endocrine: Negative for polydipsia.   Genitourinary: Positive for menstrual problem. Negative for genital sores and hematuria.   Musculoskeletal: Negative for joint swelling.   Skin: Negative for color change and rash.   Allergic/Immunologic: Negative for immunocompromised state.   Neurological: Positive for headaches. Negative for seizures, facial asymmetry and speech difficulty.   Hematological: Negative for adenopathy.   Psychiatric/Behavioral: Positive for agitation and decreased concentration. Negative for behavioral problems, dysphoric mood, self-injury and suicidal ideas. The patient is nervous/anxious.        Objective   Physical Exam  Constitutional:       General: She is not in acute distress.      Appearance: She is well-developed. She is not diaphoretic.   HENT:      Head: Normocephalic and atraumatic.   Eyes:      Conjunctiva/sclera: Conjunctivae normal.   Pulmonary:      Effort: Pulmonary effort is normal. No respiratory distress.   Musculoskeletal:         General: Normal range of motion.      Cervical back: Normal range of motion.   Skin:     General: Skin is dry.   Neurological:      Mental Status: She is alert and oriented to person, place, and time.      Coordination: Coordination normal.   Psychiatric:         Behavior: Behavior normal.         Thought Content: Thought content normal.         Judgment: Judgment normal.         Assessment/Plan   Diagnoses and all orders for this visit:    1. Migraine with aura and without status migrainosus, not intractable (Primary)    2. Anxiety    3. Recurrent major depressive disorder, in full remission (HCC)    4. Amenorrhea    Other orders  -     topiramate (TOPAMAX) 100 MG tablet; Take 1 tablet by mouth Daily. For migraine suppression  Dispense: 90 tablet; Refill: 3  -     Progesterone (Prometrium) 200 MG capsule; Take 1 capsule by mouth Daily. For 10 days and expect menses 2-7 days after last dose  Dispense: 10 capsule; Refill: 0  -     desvenlafaxine (Pristiq) 50 MG 24 hr tablet; Take 1 tablet by mouth Daily. New dose for anxiety  Dispense: 90 tablet; Refill: 3        Send Prometrium to restart periods  Increase dose Pristiq---is helping anxiey and depression---cont Wellbutrin XL works  Plan, Adry Knight, was seen today.  she was seen for Vitamin D deficiency and supplemented, Migraine headaches and will continue with their PRN triptan and Migraine headaches and well controlled on their suppressive medication.  Time 15 min

## 2021-11-14 DIAGNOSIS — U07.1 COVID-19: Primary | ICD-10-CM

## 2021-11-14 RX ORDER — DIPHENHYDRAMINE HYDROCHLORIDE 50 MG/ML
50 INJECTION INTRAMUSCULAR; INTRAVENOUS ONCE AS NEEDED
Status: CANCELLED | OUTPATIENT
Start: 2021-11-15

## 2021-11-14 RX ORDER — METHYLPREDNISOLONE SODIUM SUCCINATE 125 MG/2ML
125 INJECTION, POWDER, LYOPHILIZED, FOR SOLUTION INTRAMUSCULAR; INTRAVENOUS AS NEEDED
Status: CANCELLED | OUTPATIENT
Start: 2021-11-15

## 2021-11-14 RX ORDER — SODIUM CHLORIDE 9 MG/ML
30 INJECTION, SOLUTION INTRAVENOUS ONCE
Status: CANCELLED | OUTPATIENT
Start: 2021-11-15

## 2021-11-14 RX ORDER — EPINEPHRINE 1 MG/ML
0.3 INJECTION, SOLUTION, CONCENTRATE INTRAVENOUS AS NEEDED
Status: CANCELLED | OUTPATIENT
Start: 2021-11-15

## 2021-11-14 RX ORDER — DIPHENHYDRAMINE HCL 25 MG
50 TABLET ORAL ONCE AS NEEDED
Status: CANCELLED | OUTPATIENT
Start: 2021-11-15

## 2021-11-15 ENCOUNTER — HOSPITAL ENCOUNTER (OUTPATIENT)
Dept: INFUSION THERAPY | Facility: HOSPITAL | Age: 36
Discharge: HOME | End: 2021-11-15
Admitting: PHYSICIAN ASSISTANT

## 2021-11-15 VITALS
RESPIRATION RATE: 20 BRPM | OXYGEN SATURATION: 100 % | SYSTOLIC BLOOD PRESSURE: 110 MMHG | HEART RATE: 65 BPM | DIASTOLIC BLOOD PRESSURE: 65 MMHG | TEMPERATURE: 97.3 F

## 2021-11-15 DIAGNOSIS — U07.1 COVID-19: Primary | ICD-10-CM

## 2021-11-15 PROCEDURE — 96365 THER/PROPH/DIAG IV INF INIT: CPT

## 2021-11-15 PROCEDURE — 25010000002 INJECTION, BAMLANIVIMAB AND ETESEVIMAB, 2100 MG: Performed by: PHYSICIAN ASSISTANT

## 2021-11-15 PROCEDURE — M0245 HC IV INFUSION, BAMLANIVIMAB AND ETESEVIMAB, 2100 MG: HCPCS | Performed by: PHYSICIAN ASSISTANT

## 2021-11-15 RX ORDER — DIPHENHYDRAMINE HCL 25 MG
50 CAPSULE ORAL ONCE AS NEEDED
Status: DISCONTINUED | OUTPATIENT
Start: 2021-11-15 | End: 2021-11-17 | Stop reason: HOSPADM

## 2021-11-15 RX ORDER — SODIUM CHLORIDE 9 MG/ML
30 INJECTION, SOLUTION INTRAVENOUS ONCE
Status: COMPLETED | OUTPATIENT
Start: 2021-11-15 | End: 2021-11-15

## 2021-11-15 RX ORDER — DIPHENHYDRAMINE HYDROCHLORIDE 50 MG/ML
50 INJECTION INTRAMUSCULAR; INTRAVENOUS ONCE AS NEEDED
Status: CANCELLED | OUTPATIENT
Start: 2021-11-15

## 2021-11-15 RX ORDER — SODIUM CHLORIDE 9 MG/ML
30 INJECTION, SOLUTION INTRAVENOUS ONCE
Status: CANCELLED | OUTPATIENT
Start: 2021-11-15

## 2021-11-15 RX ORDER — DIPHENHYDRAMINE HYDROCHLORIDE 50 MG/ML
50 INJECTION INTRAMUSCULAR; INTRAVENOUS ONCE AS NEEDED
Status: DISCONTINUED | OUTPATIENT
Start: 2021-11-15 | End: 2021-11-17 | Stop reason: HOSPADM

## 2021-11-15 RX ORDER — DIPHENHYDRAMINE HCL 25 MG
50 CAPSULE ORAL ONCE AS NEEDED
Status: CANCELLED | OUTPATIENT
Start: 2021-11-15

## 2021-11-15 RX ORDER — METHYLPREDNISOLONE SODIUM SUCCINATE 125 MG/2ML
125 INJECTION, POWDER, LYOPHILIZED, FOR SOLUTION INTRAMUSCULAR; INTRAVENOUS AS NEEDED
Status: CANCELLED | OUTPATIENT
Start: 2021-11-15

## 2021-11-15 RX ORDER — METHYLPREDNISOLONE SODIUM SUCCINATE 125 MG/2ML
125 INJECTION, POWDER, LYOPHILIZED, FOR SOLUTION INTRAMUSCULAR; INTRAVENOUS AS NEEDED
Status: DISCONTINUED | OUTPATIENT
Start: 2021-11-15 | End: 2021-11-17 | Stop reason: HOSPADM

## 2021-11-15 RX ADMIN — SODIUM CHLORIDE 30 ML/HR: 9 INJECTION, SOLUTION INTRAVENOUS at 13:08

## 2021-11-15 RX ADMIN — SODIUM CHLORIDE 2100 MG: 9 INJECTION, SOLUTION INTRAVENOUS at 13:08

## 2021-11-15 NOTE — PROGRESS NOTES
1148-Patient complained of dizziiness and lightheadedness on second IV attempt.  Skin cool, pale, and dry.  O2 started at 2liters per nsal canula.  1155-O2 discontinued.

## 2021-12-06 DIAGNOSIS — U07.1 COVID-19: Primary | ICD-10-CM

## 2021-12-06 DIAGNOSIS — F41.9 ANXIETY: ICD-10-CM

## 2021-12-06 DIAGNOSIS — G43.119 INTRACTABLE MIGRAINE WITH AURA WITHOUT STATUS MIGRAINOSUS: ICD-10-CM

## 2021-12-06 DIAGNOSIS — F33.42 RECURRENT MAJOR DEPRESSIVE DISORDER, IN FULL REMISSION (HCC): ICD-10-CM

## 2021-12-06 DIAGNOSIS — K59.09 CHRONIC CONSTIPATION: ICD-10-CM

## 2021-12-06 DIAGNOSIS — K21.9 GASTROESOPHAGEAL REFLUX DISEASE WITHOUT ESOPHAGITIS: ICD-10-CM

## 2021-12-06 DIAGNOSIS — E55.9 VITAMIN D DEFICIENCY: ICD-10-CM

## 2021-12-06 DIAGNOSIS — R53.83 TIRED: ICD-10-CM

## 2021-12-08 LAB
25(OH)D3+25(OH)D2 SERPL-MCNC: 28.3 NG/ML (ref 30–100)
ALBUMIN SERPL-MCNC: 4.4 G/DL (ref 3.8–4.8)
ALBUMIN/GLOB SERPL: 1.8 {RATIO} (ref 1.2–2.2)
ALP SERPL-CCNC: 81 IU/L (ref 44–121)
ALT SERPL-CCNC: 52 IU/L (ref 0–32)
AST SERPL-CCNC: 23 IU/L (ref 0–40)
BASOPHILS # BLD AUTO: 0.1 X10E3/UL (ref 0–0.2)
BASOPHILS NFR BLD AUTO: 1 %
BILIRUB SERPL-MCNC: 0.6 MG/DL (ref 0–1.2)
BUN SERPL-MCNC: 14 MG/DL (ref 6–20)
BUN/CREAT SERPL: 16 (ref 9–23)
CALCIUM SERPL-MCNC: 9.4 MG/DL (ref 8.7–10.2)
CHLORIDE SERPL-SCNC: 107 MMOL/L (ref 96–106)
CHOLEST SERPL-MCNC: 182 MG/DL (ref 100–199)
CO2 SERPL-SCNC: 21 MMOL/L (ref 20–29)
CREAT SERPL-MCNC: 0.85 MG/DL (ref 0.57–1)
EBV NA IGG SER IA-ACNC: <18 U/ML (ref 0–17.9)
EBV VCA IGG SER IA-ACNC: 69.3 U/ML (ref 0–17.9)
EBV VCA IGM SER IA-ACNC: <36 U/ML (ref 0–35.9)
EOSINOPHIL # BLD AUTO: 0.1 X10E3/UL (ref 0–0.4)
EOSINOPHIL NFR BLD AUTO: 1 %
ERYTHROCYTE [DISTWIDTH] IN BLOOD BY AUTOMATED COUNT: 12.8 % (ref 11.7–15.4)
FOLATE SERPL-MCNC: 12.1 NG/ML
GLOBULIN SER CALC-MCNC: 2.5 G/DL (ref 1.5–4.5)
GLUCOSE SERPL-MCNC: 83 MG/DL (ref 65–99)
HBA1C MFR BLD: 5.2 % (ref 4.8–5.6)
HCT VFR BLD AUTO: 44.3 % (ref 34–46.6)
HDLC SERPL-MCNC: 42 MG/DL
HGB BLD-MCNC: 15 G/DL (ref 11.1–15.9)
IMM GRANULOCYTES # BLD AUTO: 0 X10E3/UL (ref 0–0.1)
IMM GRANULOCYTES NFR BLD AUTO: 0 %
LDLC SERPL CALC-MCNC: 128 MG/DL (ref 0–99)
LYMPHOCYTES # BLD AUTO: 1.8 X10E3/UL (ref 0.7–3.1)
LYMPHOCYTES NFR BLD AUTO: 26 %
MCH RBC QN AUTO: 30.2 PG (ref 26.6–33)
MCHC RBC AUTO-ENTMCNC: 33.9 G/DL (ref 31.5–35.7)
MCV RBC AUTO: 89 FL (ref 79–97)
MONOCYTES # BLD AUTO: 0.5 X10E3/UL (ref 0.1–0.9)
MONOCYTES NFR BLD AUTO: 7 %
NEUTROPHILS # BLD AUTO: 4.3 X10E3/UL (ref 1.4–7)
NEUTROPHILS NFR BLD AUTO: 65 %
PLATELET # BLD AUTO: 261 X10E3/UL (ref 150–450)
POTASSIUM SERPL-SCNC: 4.7 MMOL/L (ref 3.5–5.2)
PROT SERPL-MCNC: 6.9 G/DL (ref 6–8.5)
RBC # BLD AUTO: 4.96 X10E6/UL (ref 3.77–5.28)
SERVICE CMNT-IMP: ABNORMAL
SODIUM SERPL-SCNC: 140 MMOL/L (ref 134–144)
T3FREE SERPL-MCNC: 3.4 PG/ML (ref 2–4.4)
T4 FREE SERPL-MCNC: 1.28 NG/DL (ref 0.82–1.77)
TRIGL SERPL-MCNC: 61 MG/DL (ref 0–149)
TSH SERPL DL<=0.005 MIU/L-ACNC: 1.59 UIU/ML (ref 0.45–4.5)
VIT B12 SERPL-MCNC: 398 PG/ML (ref 232–1245)
VLDLC SERPL CALC-MCNC: 12 MG/DL (ref 5–40)
WBC # BLD AUTO: 6.7 X10E3/UL (ref 3.4–10.8)

## 2021-12-20 NOTE — TELEPHONE ENCOUNTER
----- Message from Adry Knight sent at 3/13/2020 12:56 PM EDT -----  Regarding: Prescription Question  Contact: [phone]  I am talking to sucralfate as prescribed but I am having a hard time making sure I get enough to drink throughout the day since the medicine says to take it 1 hour before eating or drinking or 2-3 hours after eating or drinking. Is it really necessary for me to not drink anything during those times? Could I have water?    0 = understands/communicates without difficulty

## 2022-03-24 RX ORDER — BUPROPION HYDROCHLORIDE 150 MG/1
150 TABLET ORAL DAILY
Qty: 90 TABLET | Refills: 3 | Status: SHIPPED | OUTPATIENT
Start: 2022-03-24

## 2022-03-24 RX ORDER — RIZATRIPTAN BENZOATE 10 MG/1
TABLET ORAL
Qty: 12 TABLET | Refills: 3 | Status: SHIPPED | OUTPATIENT
Start: 2022-03-24 | End: 2023-02-06

## 2022-04-11 ENCOUNTER — TELEPHONE (OUTPATIENT)
Dept: FAMILY MEDICINE CLINIC | Facility: CLINIC | Age: 37
End: 2022-04-11

## 2022-04-11 RX ORDER — DESVENLAFAXINE 25 MG/1
25 TABLET, EXTENDED RELEASE ORAL DAILY
Qty: 30 TABLET | Refills: 0 | Status: SHIPPED | OUTPATIENT
Start: 2022-04-11 | End: 2022-04-12

## 2022-04-11 NOTE — TELEPHONE ENCOUNTER
----- Message from Marleni Stewart MA sent at 4/11/2022 10:51 AM EDT -----  Regarding: FW: Pristiq    ----- Message -----  From: Adry Knight  Sent: 4/11/2022  10:31 AM EDT  To: [facility] Clinical Pool  Subject: Pristiq                                          Hi Cora, I am still experiencing random bumps on my face and neck that hurt, bad jaw clinching, strange dreams, if I accidentally miss one day I am so dizzy all day and now daily migraines for the past 2 weeks. I have done research on symptoms of pristiq and these things can all be symtpoms of the pristiq, I am wondering if we can wean me off of it and see if it gets better.

## 2022-04-12 RX ORDER — FLUOXETINE 10 MG/1
CAPSULE ORAL
Qty: 30 CAPSULE | Refills: 1 | Status: SHIPPED | OUTPATIENT
Start: 2022-04-12 | End: 2022-04-12 | Stop reason: SDUPTHER

## 2022-04-12 RX ORDER — FLUOXETINE HYDROCHLORIDE 20 MG/1
20 CAPSULE ORAL DAILY
Qty: 90 CAPSULE | Refills: 0 | Status: SHIPPED | OUTPATIENT
Start: 2022-04-12 | End: 2022-05-26 | Stop reason: SDUPTHER

## 2022-04-12 RX ORDER — FLUOXETINE 10 MG/1
CAPSULE ORAL
Qty: 30 CAPSULE | Refills: 1 | Status: SHIPPED | OUTPATIENT
Start: 2022-04-12 | End: 2022-05-26

## 2022-04-26 RX ORDER — PROGESTERONE 200 MG/1
200 CAPSULE ORAL DAILY
Qty: 10 CAPSULE | Refills: 0 | Status: SHIPPED | OUTPATIENT
Start: 2022-04-26 | End: 2022-10-13

## 2022-05-26 ENCOUNTER — OFFICE VISIT (OUTPATIENT)
Dept: FAMILY MEDICINE CLINIC | Facility: CLINIC | Age: 37
End: 2022-05-26

## 2022-05-26 VITALS
HEIGHT: 63 IN | DIASTOLIC BLOOD PRESSURE: 72 MMHG | OXYGEN SATURATION: 99 % | SYSTOLIC BLOOD PRESSURE: 122 MMHG | BODY MASS INDEX: 34.2 KG/M2 | HEART RATE: 68 BPM | TEMPERATURE: 97.6 F | RESPIRATION RATE: 16 BRPM | WEIGHT: 193 LBS

## 2022-05-26 DIAGNOSIS — F41.9 ANXIETY: ICD-10-CM

## 2022-05-26 DIAGNOSIS — G43.719 INTRACTABLE CHRONIC MIGRAINE WITHOUT AURA AND WITHOUT STATUS MIGRAINOSUS: ICD-10-CM

## 2022-05-26 DIAGNOSIS — R68.81 EARLY SATIETY: ICD-10-CM

## 2022-05-26 DIAGNOSIS — R10.84 GENERALIZED ABDOMINAL PAIN: Primary | ICD-10-CM

## 2022-05-26 DIAGNOSIS — K21.9 GASTROESOPHAGEAL REFLUX DISEASE WITHOUT ESOPHAGITIS: ICD-10-CM

## 2022-05-26 DIAGNOSIS — F33.42 RECURRENT MAJOR DEPRESSIVE DISORDER, IN FULL REMISSION: ICD-10-CM

## 2022-05-26 DIAGNOSIS — K59.09 CHRONIC CONSTIPATION: ICD-10-CM

## 2022-05-26 PROCEDURE — 99214 OFFICE O/P EST MOD 30 MIN: CPT | Performed by: PHYSICIAN ASSISTANT

## 2022-05-26 RX ORDER — PANTOPRAZOLE SODIUM 40 MG/1
40 TABLET, DELAYED RELEASE ORAL DAILY
Qty: 90 TABLET | Refills: 3 | Status: SHIPPED | OUTPATIENT
Start: 2022-05-26

## 2022-05-26 RX ORDER — FLUOXETINE HYDROCHLORIDE 20 MG/1
20 CAPSULE ORAL DAILY
Qty: 90 CAPSULE | Refills: 1 | Status: SHIPPED | OUTPATIENT
Start: 2022-05-26 | End: 2022-07-06

## 2022-05-26 NOTE — PROGRESS NOTES
"Sravan Knight is a 36 y.o. female.     History of Present Illness    Since the last visit, she has overall felt tired.  She has GERD controlled on PPI Rx, Vitamin D deficiency and will update labs for continued management, Migraine headaches and responding well to PRN triptan Rx and Migraine headaches and well controlled on suppression medication.  she has been compliant with current medications have reviewed them.  The patient denies medication side effects.  Will refill medications. /72   Pulse 68   Temp 97.6 °F (36.4 °C) (Skin)   Resp 16   Ht 160 cm (63\")   Wt 87.5 kg (193 lb)   SpO2 99%   BMI 34.19 kg/m²   Did labs 12-7-21  Results for orders placed or performed in visit on 12/06/21   Comprehensive metabolic panel    Specimen: Blood   Result Value Ref Range    Glucose 83 65 - 99 mg/dL    BUN 14 6 - 20 mg/dL    Creatinine 0.85 0.57 - 1.00 mg/dL    eGFR Non African Am 90 >59 mL/min/1.73    eGFR African Am 103 >59 mL/min/1.73    BUN/Creatinine Ratio 16 9 - 23    Sodium 140 134 - 144 mmol/L    Potassium 4.7 3.5 - 5.2 mmol/L    Chloride 107 (H) 96 - 106 mmol/L    Total CO2 21 20 - 29 mmol/L    Calcium 9.4 8.7 - 10.2 mg/dL    Total Protein 6.9 6.0 - 8.5 g/dL    Albumin 4.4 3.8 - 4.8 g/dL    Globulin 2.5 1.5 - 4.5 g/dL    A/G Ratio 1.8 1.2 - 2.2    Total Bilirubin 0.6 0.0 - 1.2 mg/dL    Alkaline Phosphatase 81 44 - 121 IU/L    AST (SGOT) 23 0 - 40 IU/L    ALT (SGPT) 52 (H) 0 - 32 IU/L   Lipid panel    Specimen: Blood   Result Value Ref Range    Total Cholesterol 182 100 - 199 mg/dL    Triglycerides 61 0 - 149 mg/dL    HDL Cholesterol 42 >39 mg/dL    VLDL Cholesterol Juvneal 12 5 - 40 mg/dL    LDL Chol Calc (NIH) 128 (H) 0 - 99 mg/dL   TSH    Specimen: Blood   Result Value Ref Range    TSH 1.590 0.450 - 4.500 uIU/mL   Hemoglobin A1c    Specimen: Blood   Result Value Ref Range    Hemoglobin A1C 5.2 4.8 - 5.6 %   T3, Free    Specimen: Blood   Result Value Ref Range    T3, Free 3.4 2.0 - 4.4 " pg/mL   T4, Free    Specimen: Blood   Result Value Ref Range    Free T4 1.28 0.82 - 1.77 ng/dL   Vitamin B12    Specimen: Blood   Result Value Ref Range    Vitamin B-12 398 232 - 1,245 pg/mL   Folate    Specimen: Blood   Result Value Ref Range    Folate 12.1 >3.0 ng/mL   Vitamin D 25 Hydroxy    Specimen: Blood   Result Value Ref Range    25 Hydroxy, Vitamin D 28.3 (L) 30.0 - 100.0 ng/mL   EBV Antibody Profile    Specimen: Blood   Result Value Ref Range    EBV VCA IgM <36.0 0.0 - 35.9 U/mL    EBV VCA IgG 69.3 (H) 0.0 - 17.9 U/mL    EBV Nuclear Antigen Ab, IgG <18.0 0.0 - 17.9 U/mL    Interpretation Comment    CBC and Differential    Specimen: Blood   Result Value Ref Range    WBC 6.7 3.4 - 10.8 x10E3/uL    RBC 4.96 3.77 - 5.28 x10E6/uL    Hemoglobin 15.0 11.1 - 15.9 g/dL    Hematocrit 44.3 34.0 - 46.6 %    MCV 89 79 - 97 fL    MCH 30.2 26.6 - 33.0 pg    MCHC 33.9 31.5 - 35.7 g/dL    RDW 12.8 11.7 - 15.4 %    Platelets 261 150 - 450 x10E3/uL    Neutrophil Rel % 65 Not Estab. %    Lymphocyte Rel % 26 Not Estab. %    Monocyte Rel % 7 Not Estab. %    Eosinophil Rel % 1 Not Estab. %    Basophil Rel % 1 Not Estab. %    Neutrophils Absolute 4.3 1.4 - 7.0 x10E3/uL    Lymphocytes Absolute 1.8 0.7 - 3.1 x10E3/uL    Monocytes Absolute 0.5 0.1 - 0.9 x10E3/uL    Eosinophils Absolute 0.1 0.0 - 0.4 x10E3/uL    Basophils Absolute 0.1 0.0 - 0.2 x10E3/uL    Immature Granulocyte Rel % 0 Not Estab. %    Immature Grans Absolute 0.0 0.0 - 0.1 x10E3/uL         Cervical mass---GYN to surgery June 21---Dr Taty Strickland.    She has seen GI for abd pain and constipation  She continues to have chronic abd pain and constipation. ? Due to pain, ? Gastroparesis? Need eval, not done   Also early satiety    Adry F Prophater is a  34 y.o. female here for a follow up visit for burning abdominal pain and constipation.  Since her last visit she is undergone an EGD and colonoscopy.  She had gastritis noted on EGD which was described as a chemical  "gastritis.  She does take NSAIDs as needed for migraine headaches.  She is been unable to tolerate any of her medicines because it causes abdominal burning.  She is eating small amounts of bland foods.  She does report that her constipation has been somewhat better.  She was unable to tolerate Linzess.  She is recently tried samples of Linzess with intolerance due to increased abdominal pain.  She tried it for a year previously and also had issues with side effects.  She is tried amitiza in the past with intolerance due to abdominal pain as well.  She is currently using MiraLAX with inconsistent results..  She is allergist to beef---rice and chocolate----capsules of meds made of gelatin --beef  Talked about Wegovy and concern with her GI---I do not advise Rx    Adry DYER Prophater female 36 y.o., /72   Pulse 68   Temp 97.6 °F (36.4 °C) (Skin)   Resp 16   Ht 160 cm (63\")   Wt 87.5 kg (193 lb)   SpO2 99%   BMI 34.19 kg/m²   who presents today for follow up of Depression and Anxiety.  She reports overall depression is controlled most of the time, having break through anxiety and stopped Pristiq d/t bruxism.  Now on Prozac 10mg daily and is helping. Need to go up on dose. . Onset of symptoms was approximately several years ago. She denies current suicidal and homicidal ideation. Risk factors are family history of anxiety and or depression and lifestyle of multiple roles.  Previous treatment includes current Rx. She complains of the following medication side effects:low sex drive. The patient has previously been in counseling.. still on Wellbutrin---does work for depression.  Effexor caused headache   Zoloft made to gain 50 pounds   Celexa had GI upset  Pristiq --bruxism      The following portions of the patient's history were reviewed and updated as appropriate: allergies, current medications, past family history, past medical history, past social history, past surgical history and problem list.    Review " of Systems   Constitutional: Negative for activity change, appetite change and unexpected weight change.   HENT: Negative for nosebleeds and trouble swallowing.    Eyes: Negative for pain and visual disturbance.   Respiratory: Negative for chest tightness, shortness of breath and wheezing.    Cardiovascular: Negative for chest pain and palpitations.   Gastrointestinal: Negative for abdominal pain and blood in stool.   Endocrine: Negative.    Genitourinary: Negative for difficulty urinating and hematuria.   Musculoskeletal: Negative for joint swelling.   Skin: Negative for color change and rash.   Allergic/Immunologic: Negative.    Neurological: Negative for syncope and speech difficulty.   Hematological: Negative for adenopathy.   Psychiatric/Behavioral: Negative for agitation, confusion and dysphoric mood. The patient is nervous/anxious.    All other systems reviewed and are negative.      Objective   Physical Exam  Vitals and nursing note reviewed.   Constitutional:       General: She is not in acute distress.     Appearance: She is well-developed. She is obese. She is not ill-appearing or toxic-appearing.   HENT:      Head: Normocephalic.      Right Ear: External ear normal.      Left Ear: External ear normal.      Nose: Nose normal.      Mouth/Throat:      Pharynx: Oropharynx is clear.   Eyes:      General: No scleral icterus.     Conjunctiva/sclera: Conjunctivae normal.      Pupils: Pupils are equal, round, and reactive to light.   Neck:      Thyroid: No thyromegaly.   Cardiovascular:      Rate and Rhythm: Normal rate and regular rhythm.      Pulses: Normal pulses.      Heart sounds: Normal heart sounds. No murmur heard.  Pulmonary:      Effort: Pulmonary effort is normal. No respiratory distress.      Breath sounds: Normal breath sounds.   Abdominal:      General: Bowel sounds are normal.      Palpations: Abdomen is soft. There is no mass.      Tenderness: There is abdominal tenderness.      Comments: Pain  more LLQ   Musculoskeletal:         General: No deformity. Normal range of motion.      Cervical back: Normal range of motion and neck supple.   Skin:     General: Skin is warm and dry.      Findings: No rash.   Neurological:      General: No focal deficit present.      Mental Status: She is alert and oriented to person, place, and time. Mental status is at baseline.   Psychiatric:         Mood and Affect: Mood normal.         Behavior: Behavior normal.         Thought Content: Thought content normal.         Judgment: Judgment normal.         Assessment & Plan   Diagnoses and all orders for this visit:    1. Generalized abdominal pain (Primary)  -     NM gastric emptying; Future    2. Chronic constipation  -     NM gastric emptying; Future    3. Early satiety  -     NM gastric emptying; Future      Plan, Adry Knight, was seen today.  she was seen for GERD and will continue on PPI medication, Vitamin D deficiency and supplemented, Migraine headaches and will continue with their PRN triptan and Migraine headaches and well controlled on their suppressive medication.  She has seen GI for abd pain and constipation  She continues to have chronic abd pain and constipation. ? Due to pain, ? Gastroparesis? Need eval, not done , also early satiety  If her gastric emptying study is positive for gastroparesis will refer to [facility] clinic  Continue Wellbutrin does help depression and will increase dose Prozac to 20 mg for breakthrough anxiety         Answers for HPI/ROS submitted by the patient on 5/19/2022  Please describe your symptoms.: Would like to talk about ozempic to aide In helping me with weight watchers as I have been struggling with weight my entire life.  Have you had these symptoms before?: Yes  How long have you been having these symptoms?: Greater than 2 weeks  What is the primary reason for your visit?: Other

## 2022-06-14 ENCOUNTER — APPOINTMENT (OUTPATIENT)
Dept: WOMENS IMAGING | Facility: HOSPITAL | Age: 37
End: 2022-06-14

## 2022-06-14 PROCEDURE — 77067 SCR MAMMO BI INCL CAD: CPT | Performed by: RADIOLOGY

## 2022-06-14 PROCEDURE — 77063 BREAST TOMOSYNTHESIS BI: CPT | Performed by: RADIOLOGY

## 2022-07-06 ENCOUNTER — TELEPHONE (OUTPATIENT)
Dept: FAMILY MEDICINE CLINIC | Facility: CLINIC | Age: 37
End: 2022-07-06

## 2022-07-06 RX ORDER — FLUOXETINE HYDROCHLORIDE 40 MG/1
40 CAPSULE ORAL DAILY
Qty: 90 CAPSULE | Refills: 1 | Status: SHIPPED | OUTPATIENT
Start: 2022-07-06

## 2022-08-26 DIAGNOSIS — E53.8 LOW SERUM VITAMIN B12: Primary | ICD-10-CM

## 2022-08-26 DIAGNOSIS — G43.719 INTRACTABLE CHRONIC MIGRAINE WITHOUT AURA AND WITHOUT STATUS MIGRAINOSUS: ICD-10-CM

## 2022-08-26 DIAGNOSIS — E78.2 HYPERLIPIDEMIA, MIXED: ICD-10-CM

## 2022-08-26 DIAGNOSIS — R79.89 LFT ELEVATION: ICD-10-CM

## 2022-08-26 DIAGNOSIS — Z14.8 HEMOCHROMATOSIS CARRIER: ICD-10-CM

## 2022-08-26 RX ORDER — NITROFURANTOIN 25; 75 MG/1; MG/1
100 CAPSULE ORAL EVERY 12 HOURS SCHEDULED
Qty: 14 CAPSULE | Refills: 0 | Status: SHIPPED | OUTPATIENT
Start: 2022-08-26 | End: 2023-01-04 | Stop reason: SDUPTHER

## 2022-08-29 ENCOUNTER — TELEPHONE (OUTPATIENT)
Dept: GASTROENTEROLOGY | Facility: CLINIC | Age: 37
End: 2022-08-29

## 2022-08-29 NOTE — TELEPHONE ENCOUNTER
----- Message from Adry Knight sent at 8/29/2022  1:27 PM EDT -----  Regarding: Gastric emptying study   Hi , my primary care doctor ordered a gastric emptying study a few months ago but I never heard anything about it, when I called today they said I probably need my gastro to order it. I wanted to check and see if this is something you can order or if I need to come in for an appt.

## 2022-08-29 NOTE — TELEPHONE ENCOUNTER
Caller: Adry Knight    Relationship: Self    Best call back number: [phone]    What is the best time to reach you: ANYTIME    Who are you requesting to speak with (clinical staff, provider,  specific staff member): CLINICAL STAFF    What was the call regarding: PATIENT HAS QUESTIONS REGARDING GASTRIC EMPTYING STUDY. WOULD LIKE TO SPEAK WITH SOMEONE IN OFFICE REGARDING THIS.    Do you require a callback: YES

## 2022-08-30 ENCOUNTER — OFFICE VISIT (OUTPATIENT)
Dept: GASTROENTEROLOGY | Facility: CLINIC | Age: 37
End: 2022-08-30

## 2022-08-30 VITALS
SYSTOLIC BLOOD PRESSURE: 121 MMHG | DIASTOLIC BLOOD PRESSURE: 83 MMHG | TEMPERATURE: 97.7 F | WEIGHT: 196.2 LBS | HEART RATE: 70 BPM | HEIGHT: 63 IN | BODY MASS INDEX: 34.76 KG/M2

## 2022-08-30 DIAGNOSIS — R10.10 PAIN OF UPPER ABDOMEN: ICD-10-CM

## 2022-08-30 DIAGNOSIS — R68.81 EARLY SATIETY: ICD-10-CM

## 2022-08-30 DIAGNOSIS — R19.01 RUQ ABDOMINAL MASS: ICD-10-CM

## 2022-08-30 DIAGNOSIS — K59.09 CHRONIC CONSTIPATION: Primary | ICD-10-CM

## 2022-08-30 DIAGNOSIS — K21.9 GASTROESOPHAGEAL REFLUX DISEASE WITHOUT ESOPHAGITIS: ICD-10-CM

## 2022-08-30 LAB
25(OH)D3+25(OH)D2 SERPL-MCNC: 33.8 NG/ML (ref 30–100)
ALBUMIN SERPL-MCNC: 4.1 G/DL (ref 3.8–4.8)
ALBUMIN/GLOB SERPL: 1.9 {RATIO} (ref 1.2–2.2)
ALP SERPL-CCNC: 78 IU/L (ref 44–121)
ALT SERPL-CCNC: 25 IU/L (ref 0–32)
AST SERPL-CCNC: 16 IU/L (ref 0–40)
BASOPHILS # BLD AUTO: 0.1 X10E3/UL (ref 0–0.2)
BASOPHILS NFR BLD AUTO: 1 %
BILIRUB SERPL-MCNC: 0.3 MG/DL (ref 0–1.2)
BUN SERPL-MCNC: 10 MG/DL (ref 6–20)
BUN/CREAT SERPL: 12 (ref 9–23)
CALCIUM SERPL-MCNC: 8.9 MG/DL (ref 8.7–10.2)
CHLORIDE SERPL-SCNC: 103 MMOL/L (ref 96–106)
CHOLEST SERPL-MCNC: 152 MG/DL (ref 100–199)
CO2 SERPL-SCNC: 20 MMOL/L (ref 20–29)
CREAT SERPL-MCNC: 0.84 MG/DL (ref 0.57–1)
EGFRCR-CYS SERPLBLD CKD-EPI 2021: 93 ML/MIN/1.73
EOSINOPHIL # BLD AUTO: 0.1 X10E3/UL (ref 0–0.4)
EOSINOPHIL NFR BLD AUTO: 1 %
ERYTHROCYTE [DISTWIDTH] IN BLOOD BY AUTOMATED COUNT: 12.1 % (ref 11.7–15.4)
FERRITIN SERPL-MCNC: 25 NG/ML (ref 15–150)
FOLATE SERPL-MCNC: 10.9 NG/ML
GLOBULIN SER CALC-MCNC: 2.2 G/DL (ref 1.5–4.5)
GLUCOSE SERPL-MCNC: 103 MG/DL (ref 65–99)
HCT VFR BLD AUTO: 41.1 % (ref 34–46.6)
HDLC SERPL-MCNC: 40 MG/DL
HGB BLD-MCNC: 13.5 G/DL (ref 11.1–15.9)
IMM GRANULOCYTES # BLD AUTO: 0 X10E3/UL (ref 0–0.1)
IMM GRANULOCYTES NFR BLD AUTO: 0 %
IRON SATN MFR SERPL: 19 % (ref 15–55)
IRON SERPL-MCNC: 60 UG/DL (ref 27–159)
LDLC SERPL CALC-MCNC: 100 MG/DL (ref 0–99)
LYMPHOCYTES # BLD AUTO: 2.1 X10E3/UL (ref 0.7–3.1)
LYMPHOCYTES NFR BLD AUTO: 28 %
MAGNESIUM SERPL-MCNC: 2.2 MG/DL (ref 1.6–2.3)
MCH RBC QN AUTO: 29.7 PG (ref 26.6–33)
MCHC RBC AUTO-ENTMCNC: 32.8 G/DL (ref 31.5–35.7)
MCV RBC AUTO: 91 FL (ref 79–97)
MONOCYTES # BLD AUTO: 0.4 X10E3/UL (ref 0.1–0.9)
MONOCYTES NFR BLD AUTO: 6 %
NEUTROPHILS # BLD AUTO: 4.9 X10E3/UL (ref 1.4–7)
NEUTROPHILS NFR BLD AUTO: 64 %
PLATELET # BLD AUTO: 229 X10E3/UL (ref 150–450)
POTASSIUM SERPL-SCNC: 3.8 MMOL/L (ref 3.5–5.2)
PROT SERPL-MCNC: 6.3 G/DL (ref 6–8.5)
RBC # BLD AUTO: 4.54 X10E6/UL (ref 3.77–5.28)
SODIUM SERPL-SCNC: 136 MMOL/L (ref 134–144)
T3FREE SERPL-MCNC: 2.7 PG/ML (ref 2–4.4)
T4 FREE SERPL-MCNC: 1.27 NG/DL (ref 0.82–1.77)
THYROGLOB AB SERPL-ACNC: <1 IU/ML (ref 0–0.9)
THYROPEROXIDASE AB SERPL-ACNC: 8 IU/ML (ref 0–34)
TIBC SERPL-MCNC: 314 UG/DL (ref 250–450)
TRIGL SERPL-MCNC: 61 MG/DL (ref 0–149)
TSH SERPL DL<=0.005 MIU/L-ACNC: 1.47 UIU/ML (ref 0.45–4.5)
UIBC SERPL-MCNC: 254 UG/DL (ref 131–425)
VIT B12 SERPL-MCNC: 314 PG/ML (ref 232–1245)
VLDLC SERPL CALC-MCNC: 12 MG/DL (ref 5–40)
WBC # BLD AUTO: 7.5 X10E3/UL (ref 3.4–10.8)

## 2022-08-30 PROCEDURE — 99214 OFFICE O/P EST MOD 30 MIN: CPT | Performed by: PHYSICIAN ASSISTANT

## 2022-08-30 RX ORDER — PREDNISONE 50 MG/1
TABLET ORAL
Qty: 3 TABLET | Refills: 0 | Status: SHIPPED | OUTPATIENT
Start: 2022-08-30 | End: 2022-10-13

## 2022-08-30 RX ORDER — PLECANATIDE 3 MG/1
3 TABLET ORAL DAILY
Qty: 30 TABLET | Refills: 1 | Status: SHIPPED | OUTPATIENT
Start: 2022-08-30 | End: 2023-01-15

## 2022-08-30 NOTE — PROGRESS NOTES
"Chief Complaint  Discuss GES    Subjective          History of Present Illness    Adry Knight is a  37 y.o. female presents for follow-up with chronic abdominal pain and constipation.  She is a patient Dr. Alas last seen in 2020.  She is new to me.    She has a history of GERD and takes pantoprazole 40 mg daily.  Today she presents with persistent chronic upper abdominal pain, constipation, and early satiety. Persistent bloating. Planning on starting weight loss med that can cause gastroparesis so PCP felt she needed a GES prior to starting given persistent symptoms.     She has a history of chronic constipation was previously on Trulance--she thinks this helped--but insurance stopped paying. Tried Linzess and Amitiza which caused worsening abdominal pain--she is allergic to gelatin.  She is taking magnesium citrate 500mg and MiraLAX 1 capful/day but will increase 3-4/day PRN. She takes enemas PRN. She reports last bowel movement 3 days ago.    2/4/2020 EGD showed mild gastritis with path showing chemical gastritis.  Colonoscopy same time showed nonbleeding internal hemorrhoids, otherwise unremarkable.  Recall recommended in 5 years.    Objective   Vital Signs:   /83   Pulse 70   Temp 97.7 °F (36.5 °C)   Ht 160 cm (63\")   Wt 89 kg (196 lb 3.2 oz)   BMI 34.76 kg/m²       Physical Exam  Vitals reviewed.   Constitutional:       General: She is awake. She is not in acute distress.     Appearance: Normal appearance. She is well-developed and well-groomed.   HENT:      Head: Normocephalic.   Pulmonary:      Effort: Pulmonary effort is normal. No respiratory distress.   Abdominal:      General: Abdomen is protuberant. Bowel sounds are normal. There is no distension.      Palpations: Abdomen is soft. There is mass. There is no hepatomegaly or splenomegaly.      Tenderness: There is abdominal tenderness in the right upper quadrant, epigastric area and left upper quadrant. There is no guarding or rebound. "          Comments: cylindrical mass palpated in RUQ as noted above--?stool   Skin:     Coloration: Skin is not pale.   Neurological:      Mental Status: She is alert and oriented to person, place, and time.      Gait: Gait is intact.   Psychiatric:         Mood and Affect: Mood and affect normal.         Speech: Speech normal.         Behavior: Behavior is cooperative.         Judgment: Judgment normal.          Result Review :             Assessment and Plan    Diagnoses and all orders for this visit:    1. Chronic constipation (Primary)  -     NM Gastric Emptying  -     CT Abdomen Pelvis With Contrast    2. Gastroesophageal reflux disease without esophagitis  -     NM Gastric Emptying  -     CT Abdomen Pelvis With Contrast    3. Early satiety  -     NM Gastric Emptying  -     CT Abdomen Pelvis With Contrast    4. Pain of upper abdomen  -     NM Gastric Emptying  -     CT Abdomen Pelvis With Contrast    5. RUQ abdominal mass  -     NM Gastric Emptying  -     CT Abdomen Pelvis With Contrast    Other orders  -     Plecanatide (Trulance) 3 MG tablet; Take 1 tablet by mouth Daily.  Dispense: 30 tablet; Refill: 1    She does have a mass in her right upper quadrant that is closer to the inferior portion of the right upper quadrant and cylindrical.  I suspect this is stool backup but will check CT scan abdomen pelvis to be sure no other source.  She does have a contrast allergy and we will premedicate with prednisone and Benadryl.  I gave her the protocol contrast allergy predosing handout today.    We will also order gastric emptying scan.  I asked her to schedule this at least a week after her CT scan just in case there is an abnormality on the CT scan that needs to be addressed before GES can be done.    She agrees to restart Trulance.  She will likely have to continue MiraLAX as well.    Follow Up   Return in about 4 weeks (around 9/27/2022) for Shirley or Dr. Alas.    Alisa dictation used throughout this note.      Shirley Curtis PA-C

## 2022-08-31 ENCOUNTER — PRIOR AUTHORIZATION (OUTPATIENT)
Dept: FAMILY MEDICINE CLINIC | Facility: CLINIC | Age: 37
End: 2022-08-31

## 2022-08-31 NOTE — TELEPHONE ENCOUNTER
TRIED TO START PA FOR TRULANCE 3 MG TABLET BUT STATED PT NOT FOUND , CALLED BACK OF INSURANCE CARD AND THE ENROLLEE ID WAS NOT FOUND/INVALID. CALLED PT A LVM TO CALL BACK WITH UP TO DATE INSURANCE INFO SO WE CAN GET HER PA STARTED.     JAYLA

## 2022-09-02 ENCOUNTER — TELEPHONE (OUTPATIENT)
Dept: GASTROENTEROLOGY | Facility: CLINIC | Age: 37
End: 2022-09-02

## 2022-09-02 NOTE — TELEPHONE ENCOUNTER
----- Message from Adry Knight sent at 9/1/2022  9:50 PM EDT -----  Regarding: Prior authorization   Hi my insurance is saying they won't cover trulance. Can we send over a prior authorization? I don't know if it will help to let them know that I am allergic to the other options or not. Thanks

## 2022-09-13 ENCOUNTER — TELEPHONE (OUTPATIENT)
Dept: FAMILY MEDICINE CLINIC | Facility: CLINIC | Age: 37
End: 2022-09-13

## 2022-09-13 NOTE — TELEPHONE ENCOUNTER
----- Message from Cora Palmer PA-C sent at 9/12/2022  6:07 PM EDT -----  Regarding: FW: Prior Authorization      ----- Message -----  From: Adry Knight  Sent: 9/12/2022   5:45 PM EDT  To: [facility] Clinical Pool  Subject: Prior Authorization                              I also transferred all of my prescriptions over [facility] in the last few days.

## 2022-09-13 NOTE — TELEPHONE ENCOUNTER
LM pt is needing a PA on a medication trulance. The PA should have came from the GI but since we have started this I was going to try to finish it. Covermymed patient it not found. I called the number on the back of insurance card and member is not found. Patient will need to call her insurance to figure out what is wrong.

## 2022-09-15 ENCOUNTER — TELEPHONE (OUTPATIENT)
Dept: GASTROENTEROLOGY | Facility: CLINIC | Age: 37
End: 2022-09-15

## 2022-09-15 ENCOUNTER — APPOINTMENT (OUTPATIENT)
Dept: NUCLEAR MEDICINE | Facility: HOSPITAL | Age: 37
End: 2022-09-15

## 2022-09-15 NOTE — TELEPHONE ENCOUNTER
Hub staff attempted to follow warm transfer process and was unsuccessful     Caller: Adry Knight    Relationship to patient: Self    Best call back number: [phone]    Patient is needing: PT WAS ON HOLD WITH MICHEL REGARDING TRULANCE PA AND ADDITIONAL SMAPLES AND SAID THE PHONE WENT TO A VOICEMAIL AND THEN DISCONNECTED. PLEASE CALL PT BACK.

## 2022-09-19 ENCOUNTER — TELEPHONE (OUTPATIENT)
Dept: GASTROENTEROLOGY | Facility: CLINIC | Age: 37
End: 2022-09-19

## 2022-09-19 NOTE — TELEPHONE ENCOUNTER
----- Message from Harshal Coburn sent at 9/19/2022  9:14 AM EDT -----  Regarding: Medication auth  Contact: [phone]  Pt came into office to  Trulance samples and inquired about prior auth for this medication. She would like a call back to confirm if this has come back. Thanks!

## 2022-09-19 NOTE — TELEPHONE ENCOUNTER
PA for Trulance 3MG tablets submitted through Pending sale to [facility] and pending.   Key: [id number]

## 2022-09-19 NOTE — TELEPHONE ENCOUNTER
Sent to Shania to submit PA. Request was sent to Shania to submit the PA on Friday September 16, 2022

## 2022-09-20 ENCOUNTER — TELEPHONE (OUTPATIENT)
Dept: GASTROENTEROLOGY | Facility: CLINIC | Age: 37
End: 2022-09-20

## 2022-09-20 ENCOUNTER — HOSPITAL ENCOUNTER (OUTPATIENT)
Dept: CT IMAGING | Facility: HOSPITAL | Age: 37
Discharge: HOME | End: 2022-09-20
Admitting: PHYSICIAN ASSISTANT

## 2022-09-20 PROCEDURE — 74177 CT ABD & PELVIS W/CONTRAST: CPT

## 2022-09-20 PROCEDURE — 0 DIATRIZOATE MEGLUMINE & SODIUM PER 1 ML: Performed by: PHYSICIAN ASSISTANT

## 2022-09-20 PROCEDURE — 25010000002 IOPAMIDOL 61 % SOLUTION: Performed by: PHYSICIAN ASSISTANT

## 2022-09-20 RX ADMIN — IOPAMIDOL 80 ML: 612 INJECTION, SOLUTION INTRAVENOUS at 11:30

## 2022-09-20 RX ADMIN — DIATRIZOATE MEGLUMINE AND DIATRIZOATE SODIUM 30 ML: 660; 100 LIQUID ORAL; RECTAL at 10:00

## 2022-09-20 NOTE — TELEPHONE ENCOUNTER
----- Message from Eli Mata RN sent at 9/20/2022 10:33 AM EDT -----  Regarding: FW: Gastric emptying study     ----- Message -----  From: Adry Knight  Sent: 9/20/2022  10:30 AM EDT  To: [facility]  Subject: Gastric emptying study                           Hi, I am currently here for my CT scan with contrast dye and I had to stop drinking it because of my throat feeling funny. So that means I will likely not be able to do contrast dye for the gastric emptying study. Is there another test that csn be ordered?

## 2022-09-20 NOTE — TELEPHONE ENCOUNTER
Let patient know there is no contrast dye used for the stomach emptying scan.  They actually give her scrambled eggs to eat.  There is no alternative test for the gastric emptying study.

## 2022-09-21 ENCOUNTER — TELEPHONE (OUTPATIENT)
Dept: GASTROENTEROLOGY | Facility: CLINIC | Age: 37
End: 2022-09-21

## 2022-09-21 NOTE — TELEPHONE ENCOUNTER
----- Message from Adry Knight sent at 9/21/2022  3:20 PM EDT -----  Regarding: Test results  Just wanting to understand the results of my CT scan . Let me know when u have had the chance to look over it.

## 2022-09-22 ENCOUNTER — TELEPHONE (OUTPATIENT)
Dept: GASTROENTEROLOGY | Facility: CLINIC | Age: 37
End: 2022-09-22

## 2022-09-22 NOTE — TELEPHONE ENCOUNTER
September 22, 2022    Shirley Curtis PA-C  to [facility] 1 Pool        11:59 AM  I sent a results message to nursing pod 2 days ago on this patient regarding her CT scan results.  I went ahead just now and sent her a [employer] message back.  Just a reminder to please double check results queue to make sure that results have not already been sent either to nursing pod or via [employer].    **Message noted.  Eli Jean RN.

## 2022-09-22 NOTE — TELEPHONE ENCOUNTER
----- Message from Shirley Curtis PA-C sent at 9/20/2022  5:41 PM EDT -----  Please let patient know that her CT scan looks good.  No concerns.  Suspect symptoms are related to stool backup/constipation.  Is Trulance working for her?  Looks like we had to do a prior Auth yesterday and it still pending.  I gave her samples.

## 2022-09-22 NOTE — TELEPHONE ENCOUNTER
September 22, 2022    Shirley Curtis PA-C  to [facility] 1 Pool          11:59 AM  I sent a results message to nursing pod 2 days ago on this patient regarding her CT scan results.  I went ahead just now and sent her a [employer] message back.  Just a reminder to please double check results queue to make sure that results have not already been sent either to nursing pod or via [employer].     **Message noted.  Eli Mata RN.

## 2022-09-22 NOTE — TELEPHONE ENCOUNTER
----- Message from Adry Knight sent at 9/22/2022  4:30 PM EDT -----  Regarding: Results of CT scan  Trulance is definitely helping. The pharmacy said they sent a PA over on the 14th and 19th but the insurance still doesn't want to pay It and that my dr would need to talk to my insurance?? I am allergic to linzess :(

## 2022-09-23 NOTE — TELEPHONE ENCOUNTER
September 23, 2022    Shirley Curtis PA-C  to Me • [facility] Pool          7:20 AM  PA was originally submitted on the 19th.  Have not heard back regarding approval or not.  Patient's message suggests that probably was not approved.  If this is the case, can we get a denial letter to see what they want?  Also, can we get her some samples in the meantime?     Thank you to everyone, Shirley     **Call to pt.  Advise of above.  Samples at .      Message to Jeanie.  Eli Mata RN.

## 2022-09-23 NOTE — TELEPHONE ENCOUNTER
CMM cannot verify patient eligibility. I have tried the submit the PA 3 times using different forms and it will not go through. Called and spoke with pharmacist who gave me the same Rx information in the patient's chart.

## 2022-09-23 NOTE — TELEPHONE ENCOUNTER
Called patient to make sure we have th correct Rx info. Patient stated her PCP's office has also had a difficult time with her medication approval. Patient suggested I try to use [employer] for the PCN and see if that works.

## 2022-10-06 ENCOUNTER — HOSPITAL ENCOUNTER (OUTPATIENT)
Dept: NUCLEAR MEDICINE | Facility: HOSPITAL | Age: 37
Discharge: HOME | End: 2022-10-06

## 2022-10-06 ENCOUNTER — TELEPHONE (OUTPATIENT)
Dept: GASTROENTEROLOGY | Facility: CLINIC | Age: 37
End: 2022-10-06

## 2022-10-06 PROCEDURE — 78264 GASTRIC EMPTYING IMG STUDY: CPT

## 2022-10-06 PROCEDURE — 0 TECHNETIUM SULFUR COLLOID: Performed by: PHYSICIAN ASSISTANT

## 2022-10-06 PROCEDURE — A9541 TC99M SULFUR COLLOID: HCPCS | Performed by: PHYSICIAN ASSISTANT

## 2022-10-06 RX ADMIN — TECHNETIUM TC 99M SULFUR COLLOID 1 DOSE: KIT at 08:03

## 2022-10-06 NOTE — TELEPHONE ENCOUNTER
----- Message from Nafisa Galloway RN sent at 10/5/2022  3:35 PM EDT -----  Regarding: FW: Prior authorization     ----- Message -----  From: Adry Knight  Sent: 10/5/2022  11:06 AM EDT  To: [facility]  Subject: Prior authorization                              Hi I am still waiting on a prior authorization for trulance. The last I heard someone from your office called and said when they entered my insurance information I wasn't found. She said she was going to try again another way but I haven't heard again since.

## 2022-10-06 NOTE — TELEPHONE ENCOUNTER
Please see other telephone encounter from today that has been sent to Violet LANG to look into this PA for the patient

## 2022-10-06 NOTE — TELEPHONE ENCOUNTER
Encounter sent to David to see if she can look into this prior authorization for Trulance that Shania has been working on.

## 2022-10-06 NOTE — TELEPHONE ENCOUNTER
Pt stopped by to  samples and inquired again about PA for prescription. Would like a call back to see if there is anything she can do to streamline the process.   [phone]  Thanks!

## 2022-10-13 ENCOUNTER — OFFICE VISIT (OUTPATIENT)
Dept: GASTROENTEROLOGY | Facility: CLINIC | Age: 37
End: 2022-10-13

## 2022-10-13 ENCOUNTER — TELEPHONE (OUTPATIENT)
Dept: GASTROENTEROLOGY | Facility: CLINIC | Age: 37
End: 2022-10-13

## 2022-10-13 VITALS
WEIGHT: 193.6 LBS | DIASTOLIC BLOOD PRESSURE: 71 MMHG | HEIGHT: 63 IN | BODY MASS INDEX: 34.3 KG/M2 | SYSTOLIC BLOOD PRESSURE: 111 MMHG | TEMPERATURE: 96.6 F | HEART RATE: 87 BPM

## 2022-10-13 DIAGNOSIS — K21.9 GASTROESOPHAGEAL REFLUX DISEASE WITHOUT ESOPHAGITIS: ICD-10-CM

## 2022-10-13 DIAGNOSIS — K59.09 CHRONIC CONSTIPATION: ICD-10-CM

## 2022-10-13 DIAGNOSIS — K31.84 GASTROPARESIS: Primary | ICD-10-CM

## 2022-10-13 PROCEDURE — 99213 OFFICE O/P EST LOW 20 MIN: CPT | Performed by: PHYSICIAN ASSISTANT

## 2022-10-13 NOTE — TELEPHONE ENCOUNTER
----- Message from Shirley Curtis PA-C sent at 10/13/2022  9:49 AM EDT -----  Patient needs referral to [facility] motility clinic--order placed but I think form needs to be sent

## 2022-10-13 NOTE — PROGRESS NOTES
"Chief Complaint  Constipation    Subjective          History of Present Illness    Adry Knight is a  37 y.o. female presents for follow-up on chronic abdominal pain and constipation.  She is a patient Dr. Alas last seen by me on 8/30/2022.    She has a history of GERD and takes pantoprazole 40 mg daily.  She underwent GES recently which was positive for gastroparesis. She has done research on this and has started gastroparesis diet which is helping.  She has seen improvement in her abdominal pain and constipation with diet change.    She has a history of chronic constipation successfully treated with Trulance.  We are working on prior Auth through her insurance company.  She has tried Linzess and Amitiza which caused worsening abdominal pain.  She is allergic to gelatin so cannot take most capsules.  She has failed magnesium tablet and titrating doses of MiraLAX.  She takes enemas as needed.    10/6/2022 GES positive for delayed stomach emptying with 26% food retained at 4 hours.    9/20/2022 CT scan was unremarkable.    2/4/2020 EGD showed mild gastritis with path showing chemical gastritis.  Colonoscopy same time showed nonbleeding internal hemorrhoids, otherwise unremarkable.  Recall recommended in 5 years.    Objective   Vital Signs:   /71   Pulse 87   Temp 96.6 °F (35.9 °C)   Ht 160 cm (63\")   Wt 87.8 kg (193 lb 9.6 oz)   BMI 34.29 kg/m²       Physical Exam  Vitals reviewed.   Constitutional:       General: She is awake. She is not in acute distress.     Appearance: Normal appearance. She is well-developed and well-groomed.   HENT:      Head: Normocephalic.   Pulmonary:      Effort: Pulmonary effort is normal. No respiratory distress.   Skin:     Coloration: Skin is not pale.   Neurological:      Mental Status: She is alert and oriented to person, place, and time.      Gait: Gait is intact.   Psychiatric:         Mood and Affect: Mood and affect normal.         Speech: Speech normal.        "  Behavior: Behavior is cooperative.         Judgment: Judgment normal.          Result Review :             Assessment and Plan    Diagnoses and all orders for this visit:    1. Gastroparesis (Primary)  -     Ambulatory Referral to Gastroenterology    2. Gastroesophageal reflux disease without esophagitis    3. Chronic constipation    Continue trulance and gastroparesis diet for above problems. She declines nutrition referral.  Did give her handouts on gastroparesis diet.    Referral to motility clinic for further work up on source and other options to treat.       Follow Up   Return in about 6 months (around 4/13/2023) for Dr. Alas.    Alisa dictation used throughout this note.     Shirley Curtis PA-C

## 2022-10-14 NOTE — TELEPHONE ENCOUNTER
I called the # on the back of the card and it got to all the way to the prior auth line and it stated they has a busier call volume and disconnected me. I got onto [employer] [facility] and found a PA form, I filled it out and sent for signature.

## 2022-10-28 ENCOUNTER — TELEPHONE (OUTPATIENT)
Dept: GASTROENTEROLOGY | Facility: CLINIC | Age: 37
End: 2022-10-28

## 2022-10-28 NOTE — TELEPHONE ENCOUNTER
----- Message from Adry Knight sent at 10/27/2022  1:48 PM EDT -----  Regarding: Prior authorization   Contact: [phone]  Hi, I spoke with the insurance company today and got my name fixed so maybe now the prior authorization will go. Please let me know, thanks

## 2022-10-31 NOTE — TELEPHONE ENCOUNTER
PA Case: [id number], Status: Approved, Coverage Starts on: 10/31/2022 12:00:00 AM, Coverage Ends on: 10/31/2023 12:00:00 AM

## 2022-11-20 ENCOUNTER — PATIENT MESSAGE (OUTPATIENT)
Dept: FAMILY MEDICINE CLINIC | Facility: CLINIC | Age: 37
End: 2022-11-20

## 2022-11-20 RX ORDER — OSELTAMIVIR PHOSPHATE 75 MG/1
75 CAPSULE ORAL DAILY
Qty: 10 CAPSULE | Refills: 0 | Status: SHIPPED | OUTPATIENT
Start: 2022-11-20

## 2022-11-20 NOTE — TELEPHONE ENCOUNTER
From: Adry DYER Prophcortney  To: Cora Palmer PA-C  Sent: 11/20/2022 11:20 AM EST  Subject: Flu    Hey Cora, my son has had the flu for a week and I am starting to feel a little sick is there anyways u can call my in Tamiflu so I can try and nip it quickly.

## 2022-11-22 ENCOUNTER — APPOINTMENT (OUTPATIENT)
Dept: NUCLEAR MEDICINE | Facility: HOSPITAL | Age: 37
End: 2022-11-22

## 2023-01-15 RX ORDER — PLECANATIDE 3 MG/1
TABLET ORAL
Qty: 30 TABLET | Refills: 5 | Status: SHIPPED | OUTPATIENT
Start: 2023-01-15

## 2023-02-06 RX ORDER — RIZATRIPTAN BENZOATE 10 MG/1
TABLET ORAL
Qty: 12 TABLET | Refills: 0 | Status: SHIPPED | OUTPATIENT
Start: 2023-02-06

## 2023-03-09 ENCOUNTER — HOSPITAL ENCOUNTER (EMERGENCY)
Facility: HOSPITAL | Age: 38
Discharge: HOME | End: 2023-03-10
Attending: EMERGENCY MEDICINE | Admitting: EMERGENCY MEDICINE
Payer: COMMERCIAL

## 2023-03-09 DIAGNOSIS — R11.2 NAUSEA, VOMITING, AND DIARRHEA: ICD-10-CM

## 2023-03-09 DIAGNOSIS — R10.9 ABDOMINAL PAIN, UNSPECIFIED ABDOMINAL LOCATION: Primary | ICD-10-CM

## 2023-03-09 DIAGNOSIS — K31.84 GASTROPARESIS: ICD-10-CM

## 2023-03-09 DIAGNOSIS — R19.7 NAUSEA, VOMITING, AND DIARRHEA: ICD-10-CM

## 2023-03-09 LAB
ALBUMIN SERPL-MCNC: 4.5 G/DL (ref 3.5–5.2)
ALBUMIN/GLOB SERPL: 1.4 G/DL
ALP SERPL-CCNC: 156 U/L (ref 39–117)
ALT SERPL W P-5'-P-CCNC: 26 U/L (ref 1–33)
ANION GAP SERPL CALCULATED.3IONS-SCNC: 13 MMOL/L (ref 5–15)
AST SERPL-CCNC: 12 U/L (ref 1–32)
BASOPHILS # BLD AUTO: 0.01 10*3/MM3 (ref 0–0.2)
BASOPHILS NFR BLD AUTO: 0.1 % (ref 0–1.5)
BILIRUB SERPL-MCNC: 0.5 MG/DL (ref 0–1.2)
BILIRUB UR QL STRIP: NEGATIVE
BUN SERPL-MCNC: 17 MG/DL (ref 6–20)
BUN/CREAT SERPL: 18.3 (ref 7–25)
CALCIUM SPEC-SCNC: 9.5 MG/DL (ref 8.6–10.5)
CHLORIDE SERPL-SCNC: 100 MMOL/L (ref 98–107)
CLARITY UR: CLEAR
CO2 SERPL-SCNC: 24 MMOL/L (ref 22–29)
COLOR UR: YELLOW
CREAT SERPL-MCNC: 0.93 MG/DL (ref 0.57–1)
DEPRECATED RDW RBC AUTO: 38.5 FL (ref 37–54)
EGFRCR SERPLBLD CKD-EPI 2021: 82.4 ML/MIN/1.73
EOSINOPHIL # BLD AUTO: 0.11 10*3/MM3 (ref 0–0.4)
EOSINOPHIL NFR BLD AUTO: 1.3 % (ref 0.3–6.2)
ERYTHROCYTE [DISTWIDTH] IN BLOOD BY AUTOMATED COUNT: 12.3 % (ref 12.3–15.4)
GLOBULIN UR ELPH-MCNC: 3.2 GM/DL
GLUCOSE SERPL-MCNC: 95 MG/DL (ref 65–99)
GLUCOSE UR STRIP-MCNC: NEGATIVE MG/DL
HCT VFR BLD AUTO: 48.5 % (ref 34–46.6)
HGB BLD-MCNC: 16.5 G/DL (ref 12–15.9)
HGB UR QL STRIP.AUTO: NEGATIVE
HOLD SPECIMEN: NORMAL
HOLD SPECIMEN: NORMAL
IMM GRANULOCYTES # BLD AUTO: 0.02 10*3/MM3 (ref 0–0.05)
IMM GRANULOCYTES NFR BLD AUTO: 0.2 % (ref 0–0.5)
KETONES UR QL STRIP: ABNORMAL
LEUKOCYTE ESTERASE UR QL STRIP.AUTO: NEGATIVE
LIPASE SERPL-CCNC: 17 U/L (ref 13–60)
LYMPHOCYTES # BLD AUTO: 1.68 10*3/MM3 (ref 0.7–3.1)
LYMPHOCYTES NFR BLD AUTO: 20 % (ref 19.6–45.3)
MCH RBC QN AUTO: 29.8 PG (ref 26.6–33)
MCHC RBC AUTO-ENTMCNC: 34 G/DL (ref 31.5–35.7)
MCV RBC AUTO: 87.5 FL (ref 79–97)
MONOCYTES # BLD AUTO: 0.58 10*3/MM3 (ref 0.1–0.9)
MONOCYTES NFR BLD AUTO: 6.9 % (ref 5–12)
NEUTROPHILS NFR BLD AUTO: 5.99 10*3/MM3 (ref 1.7–7)
NEUTROPHILS NFR BLD AUTO: 71.5 % (ref 42.7–76)
NITRITE UR QL STRIP: NEGATIVE
NRBC BLD AUTO-RTO: 0 /100 WBC (ref 0–0.2)
PH UR STRIP.AUTO: 5.5 [PH] (ref 5–8)
PLATELET # BLD AUTO: 340 10*3/MM3 (ref 140–450)
PMV BLD AUTO: 10.7 FL (ref 6–12)
POTASSIUM SERPL-SCNC: 3.4 MMOL/L (ref 3.5–5.2)
PROT SERPL-MCNC: 7.7 G/DL (ref 6–8.5)
PROT UR QL STRIP: NEGATIVE
RBC # BLD AUTO: 5.54 10*6/MM3 (ref 3.77–5.28)
SODIUM SERPL-SCNC: 137 MMOL/L (ref 136–145)
SP GR UR STRIP: 1.02 (ref 1–1.03)
UROBILINOGEN UR QL STRIP: ABNORMAL
WBC NRBC COR # BLD: 8.39 10*3/MM3 (ref 3.4–10.8)
WHOLE BLOOD HOLD COAG: NORMAL
WHOLE BLOOD HOLD SPECIMEN: NORMAL

## 2023-03-09 PROCEDURE — 80053 COMPREHEN METABOLIC PANEL: CPT

## 2023-03-09 PROCEDURE — 99284 EMERGENCY DEPT VISIT MOD MDM: CPT

## 2023-03-09 PROCEDURE — 81003 URINALYSIS AUTO W/O SCOPE: CPT

## 2023-03-09 PROCEDURE — 36415 COLL VENOUS BLD VENIPUNCTURE: CPT

## 2023-03-09 PROCEDURE — 83690 ASSAY OF LIPASE: CPT

## 2023-03-09 PROCEDURE — 84703 CHORIONIC GONADOTROPIN ASSAY: CPT | Performed by: EMERGENCY MEDICINE

## 2023-03-09 PROCEDURE — 85025 COMPLETE CBC W/AUTO DIFF WBC: CPT

## 2023-03-09 RX ORDER — SODIUM CHLORIDE 0.9 % (FLUSH) 0.9 %
10 SYRINGE (ML) INJECTION AS NEEDED
Status: DISCONTINUED | OUTPATIENT
Start: 2023-03-09 | End: 2023-03-10 | Stop reason: HOSPADM

## 2023-03-10 ENCOUNTER — APPOINTMENT (OUTPATIENT)
Dept: CT IMAGING | Facility: HOSPITAL | Age: 38
End: 2023-03-10
Payer: COMMERCIAL

## 2023-03-10 VITALS
WEIGHT: 180 LBS | RESPIRATION RATE: 18 BRPM | HEIGHT: 63 IN | DIASTOLIC BLOOD PRESSURE: 75 MMHG | SYSTOLIC BLOOD PRESSURE: 109 MMHG | BODY MASS INDEX: 31.89 KG/M2 | HEART RATE: 66 BPM | OXYGEN SATURATION: 99 % | TEMPERATURE: 98.9 F

## 2023-03-10 LAB — HCG SERPL QL: NEGATIVE

## 2023-03-10 PROCEDURE — 74176 CT ABD & PELVIS W/O CONTRAST: CPT

## 2023-03-10 PROCEDURE — 96375 TX/PRO/DX INJ NEW DRUG ADDON: CPT

## 2023-03-10 PROCEDURE — 96374 THER/PROPH/DIAG INJ IV PUSH: CPT

## 2023-03-10 PROCEDURE — 25010000002 HYDROMORPHONE PER 4 MG: Performed by: EMERGENCY MEDICINE

## 2023-03-10 PROCEDURE — 25010000002 ONDANSETRON PER 1 MG: Performed by: EMERGENCY MEDICINE

## 2023-03-10 RX ORDER — ONDANSETRON 4 MG/1
4 TABLET, ORALLY DISINTEGRATING ORAL EVERY 8 HOURS PRN
Qty: 10 TABLET | Refills: 0 | Status: SHIPPED | OUTPATIENT
Start: 2023-03-10

## 2023-03-10 RX ORDER — METOCLOPRAMIDE 10 MG/1
10 TABLET ORAL EVERY 6 HOURS PRN
Qty: 20 TABLET | Refills: 0 | Status: SHIPPED | OUTPATIENT
Start: 2023-03-10

## 2023-03-10 RX ORDER — HYDROMORPHONE HYDROCHLORIDE 1 MG/ML
0.5 INJECTION, SOLUTION INTRAMUSCULAR; INTRAVENOUS; SUBCUTANEOUS ONCE
Status: COMPLETED | OUTPATIENT
Start: 2023-03-10 | End: 2023-03-10

## 2023-03-10 RX ORDER — ONDANSETRON 2 MG/ML
4 INJECTION INTRAMUSCULAR; INTRAVENOUS ONCE
Status: COMPLETED | OUTPATIENT
Start: 2023-03-10 | End: 2023-03-10

## 2023-03-10 RX ADMIN — SODIUM CHLORIDE 1000 ML: 9 INJECTION, SOLUTION INTRAVENOUS at 00:06

## 2023-03-10 RX ADMIN — HYDROMORPHONE HYDROCHLORIDE 0.5 MG: 1 INJECTION, SOLUTION INTRAMUSCULAR; INTRAVENOUS; SUBCUTANEOUS at 00:05

## 2023-03-10 RX ADMIN — ONDANSETRON 4 MG: 2 INJECTION INTRAMUSCULAR; INTRAVENOUS at 00:05

## 2023-03-10 NOTE — ED PROVIDER NOTES
MD ATTESTATION NOTE    The YORDAN and I have discussed this patient's history, physical exam, and treatment plan.  I have reviewed the documentation and personally had a face to face interaction with the patient. I affirm the documentation and agree with the treatment and plan.  The attached note describes my personal findings.      I provided a substantive portion of the care of the patient.  I personally performed the physical exam in its entirety, and below are my findings.  For this patient encounter, the patient wore surgical mask, I wore full protective PPE including N95 and eye protection.      Brief HPI: This patient is a 37-year-old female with a reported history of gastroparesis presenting to the emergency room today with abdominal discomfort that she states is worse in her left upper quadrant with radiation through to the back.  She does report associated nausea/vomiting/diarrhea.  She denies fevers, chest pain, or shortness of breath.      PHYSICAL EXAM  ED Triage Vitals   Temp Heart Rate Resp BP SpO2   03/09/23 2058 03/09/23 2058 03/09/23 2058 03/09/23 2111 03/09/23 2058   98.9 °F (37.2 °C) 87 16 120/86 98 %      Temp src Heart Rate Source Patient Position BP Location FiO2 (%)   03/09/23 2058 03/09/23 2058 03/09/23 2111 03/09/23 2111 --   Tympanic Monitor Sitting Right arm          GENERAL: In mild distress due to discomfort, nontoxic in appearance  HENT: nares patent  EYES: no scleral icterus  CV: regular rhythm, normal rate, no M/R/G  RESPIRATORY: normal effort, lungs clear bilaterally  ABDOMEN: soft, nontender, no rebound or guarding  MUSCULOSKELETAL: no deformity, no edema  NEURO: alert, moves all extremities, follows commands  PSYCH:  calm, cooperative  SKIN: warm, dry    Vital signs and nursing notes reviewed.        Plan: We will treat the patient's discomfort and nausea as we obtain labs as well as a CT scan of the abdomen and pelvis in the ED today.  Will monitor and reassess following.        Ángel Catalan MD  03/10/23 0378

## 2023-03-10 NOTE — DISCHARGE INSTRUCTIONS
Home, rest, medicine as directed, keep well-hydrated, advance bland diet as tolerated.  Home medicine as prescribed, follow up with PCP for recheck. Return to care with further concerns.

## 2023-03-10 NOTE — ED TRIAGE NOTES
Pt c/o left upper abd pain that started on Sunday. Reports n/v. Pt started tirzepatide Friday    This RN wore mask and goggles during time of contact     [FreeTextEntry1] : EKG:  JANA CHARLESL.

## 2023-03-10 NOTE — ED PROVIDER NOTES
EMERGENCY DEPARTMENT ENCOUNTER    Room Number:  08/08  Date of encounter:  3/10/2023  PCP: Cora Palmer PA-C  Patient Care Team:  Cora Palmer PA-C as PCP - General  Cora Palmer PA-C as PCP - Family Medicine  Marleni Alas MD as Consulting Physician (Gastroenterology)  Taty Strickland MD as Consulting Physician (Obstetrics and Gynecology)  Jan Hamm MD as Consulting Physician (Allergy and Immunology)   Independent Historians: Patient    HPI:  Chief Complaint: Abdominal  A complete HPI/ROS/PMH/PSH/SH/FH are unobtainable due to: N/A    Chronic or social conditions impacting patient care (social determinants of health): None    Context: Adry Knight is a 37 y.o. female with past medical history of gastroparesis, IBS-C, and prior cholecystectomy who arrives to the ED with complaint of severe stomach pain.  Patient states that her symptoms started on Sunday, the pain is located in the left upper quadrant, has been constant and radiates to the back.  The pain seems to be worse with movement, nothing makes it better.  Is been associated with decreased appetite, nausea, vomiting, and diarrhea.  Denies any fever, UTI symptoms, chest pain or shortness of breath.  Patient states that she started a new medicine, Mounjaro, on Friday.    Review of prior external notes (non-ED): None    Review of prior external test results outside of this encounter: None    PAST MEDICAL HISTORY  Active Ambulatory Problems     Diagnosis Date Noted   • Intractable migraine with aura without status migrainosus 11/11/2015   • Headache, migraine 11/11/2015   • Burning or prickling sensation 11/11/2015   • Anxiety 11/11/2015   • Depression 11/11/2015   • Low back pain without sciatica 01/04/2016   • Neck pain 01/04/2016   • Pain of left scapula 02/01/2016   • Chronic constipation 05/31/2019   • Gastroesophageal reflux disease without esophagitis 05/31/2019   • Intractable chronic migraine without aura 01/06/2020   •  Abdominal bloating 01/09/2020   • Generalized abdominal pain 01/09/2020   • AAT (alpha-1-antitrypsin) deficiency (HCC) 07/14/2020   • 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   • Close exposure to COVID-19 virus 09/14/2020   • COVID-19 11/14/2021   • BMI 31.0-31.9,adult 11/14/2021     Resolved Ambulatory Problems     Diagnosis Date Noted   • Biliary dyskinesia 10/23/2018     Past Medical History:   Diagnosis Date   • Allergic    • Dysphagia    • Gastroparesis    • GERD (gastroesophageal reflux disease)    • Headache    • Hernia    • Irritable bowel syndrome    • Lactose intolerance    • LFT elevation    • Low back pain    • Migraines    • Scoliosis    • Shingles    • Urinary tract infection    • Wears glasses        The patient has started, but not completed, their COVID-19 vaccination series.    PAST SURGICAL HISTORY  Past Surgical History:   Procedure Laterality Date   • CHOLECYSTECTOMY  10/23/18   • CHOLECYSTECTOMY WITH INTRAOPERATIVE CHOLANGIOGRAM N/A 10/24/2018    Procedure: LAPAROSCOPIC CHOLECYSTECTOMY WITH INTRAOPERATIVE CHOLANGIOGRAM, POSSIBLE OPEN;  Surgeon: Zuleima Johansen MD;  Location:  [facility] [place];  Service: General   • COLONOSCOPY N/A 2/4/2020    Procedure: COLONOSCOPY to cecum;  Surgeon: Marleni Alas MD;  Location: [facility] END[facility];  Service: Gastroenterology;  Laterality: N/A;  pre - abd pain  post - hemorrhoids   • ENDOSCOPY N/A 2/4/2020    Procedure: ESOPHAGOGASTRODUODENOSCOPY with cold bx;  Surgeon: Marleni Alas MD;  Location: [facility];  Service: Gastroenterology;  Laterality: N/A;  pre - gerd, abd pain  post - gastritis   • TONSILLECTOMY     • UPPER GASTROINTESTINAL ENDOSCOPY N/A 09/21/2015    Normal esophagus, Normal stomach, Normal duodenum, Dr. Marleni Alas   • VAGINAL DELIVERY N/A 10/08/2012    Dr. Brittney Wesley         FAMILY HISTORY  Family History   Problem Relation Age of Onset   • Depression Mother    • Irritable bowel syndrome Mother    • Alcohol abuse Mother    •  Asthma Mother    • Anxiety disorder Mother    • Arthritis Mother    • Cancer Sister    • Celiac disease Sister    • Cancer Paternal Grandmother    • Celiac disease Sister    • Gallbladder disease Sister    • Irritable bowel syndrome Sister    • Irritable bowel syndrome Maternal Grandmother    • Alcohol abuse Maternal Grandmother    • Depression Maternal Grandmother    • Mental illness Maternal Grandmother    • Alcohol abuse Brother    • Asthma Brother    • Malig Hyperthermia Neg Hx          SOCIAL HISTORY  Social History     Socioeconomic History   • Marital status:    Tobacco Use   • Smoking status: Never   • Smokeless tobacco: Never   Vaping Use   • Vaping Use: Never used   Substance and Sexual Activity   • Alcohol use: No   • Drug use: No   • Sexual activity: Yes     Partners: Male     Birth control/protection: Condom         ALLERGIES  Chocolate, Contrast dye (echo or unknown ct/mr), Beef-derived products, Gelatin, and Rice allergy skin test        REVIEW OF SYSTEMS  Review of Systems     All systems reviewed and negative except for those discussed in HPI.       PHYSICAL EXAM    I have reviewed the triage vital signs and nursing notes.    ED Triage Vitals   Temp Heart Rate Resp BP SpO2   03/09/23 2058 03/09/23 2058 03/09/23 2058 03/09/23 2111 03/09/23 2058   98.9 °F (37.2 °C) 87 16 120/86 98 %      Temp src Heart Rate Source Patient Position BP Location FiO2 (%)   03/09/23 2058 03/09/23 2058 03/09/23 2111 03/09/23 2111 --   Tympanic Monitor Sitting Right arm        Physical Exam    GENERAL: alert and oriented x4, not distressed  HENT: normocephalic, atraumatic, dry mucous membranes  EYES: no scleral icterus, PERRL, EOMI  CV: regular rhythm, regular rate, normal distal perfusion  RESPIRATORY: normal effort, CTAB  ABDOMEN: soft, mild diffuse tenderness without rebound or guarding, normal bowel sounds  MUSCULOSKELETAL: no deformity  NEURO: alert, moves all extremities, no focal neuro deficits, follows  commands  SKIN: warm, dry, no rash   Psych: Appropriate mood and affect      Nursing notes and vital signs reviewed      LAB RESULTS  Recent Results (from the past 24 hour(s))   Comprehensive Metabolic Panel    Collection Time: 03/09/23  9:15 PM    Specimen: Arm, Right; Blood   Result Value Ref Range    Glucose 95 65 - 99 mg/dL    BUN 17 6 - 20 mg/dL    Creatinine 0.93 0.57 - 1.00 mg/dL    Sodium 137 136 - 145 mmol/L    Potassium 3.4 (L) 3.5 - 5.2 mmol/L    Chloride 100 98 - 107 mmol/L    CO2 24.0 22.0 - 29.0 mmol/L    Calcium 9.5 8.6 - 10.5 mg/dL    Total Protein 7.7 6.0 - 8.5 g/dL    Albumin 4.5 3.5 - 5.2 g/dL    ALT (SGPT) 26 1 - 33 U/L    AST (SGOT) 12 1 - 32 U/L    Alkaline Phosphatase 156 (H) 39 - 117 U/L    Total Bilirubin 0.5 0.0 - 1.2 mg/dL    Globulin 3.2 gm/dL    A/G Ratio 1.4 g/dL    BUN/Creatinine Ratio 18.3 7.0 - 25.0    Anion Gap 13.0 5.0 - 15.0 mmol/L    eGFR 82.4 >60.0 mL/min/1.73   Lipase    Collection Time: 03/09/23  9:15 PM    Specimen: Arm, Right; Blood   Result Value Ref Range    Lipase 17 13 - 60 U/L   Green Top (Gel)    Collection Time: 03/09/23  9:15 PM   Result Value Ref Range    Extra Tube Hold for add-ons.    Lavender Top    Collection Time: 03/09/23  9:15 PM   Result Value Ref Range    Extra Tube hold for add-on    Gold Top - SST    Collection Time: 03/09/23  9:15 PM   Result Value Ref Range    Extra Tube Hold for add-ons.    Light Blue Top    Collection Time: 03/09/23  9:15 PM   Result Value Ref Range    Extra Tube Hold for add-ons.    CBC Auto Differential    Collection Time: 03/09/23  9:15 PM    Specimen: Arm, Right; Blood   Result Value Ref Range    WBC 8.39 3.40 - 10.80 10*3/mm3    RBC 5.54 (H) 3.77 - 5.28 10*6/mm3    Hemoglobin 16.5 (H) 12.0 - 15.9 g/dL    Hematocrit 48.5 (H) 34.0 - 46.6 %    MCV 87.5 79.0 - 97.0 fL    MCH 29.8 26.6 - 33.0 pg    MCHC 34.0 31.5 - 35.7 g/dL    RDW 12.3 12.3 - 15.4 %    RDW-SD 38.5 37.0 - 54.0 fl    MPV 10.7 6.0 - 12.0 fL    Platelets 340 140 - 450  10*3/mm3    Neutrophil % 71.5 42.7 - 76.0 %    Lymphocyte % 20.0 19.6 - 45.3 %    Monocyte % 6.9 5.0 - 12.0 %    Eosinophil % 1.3 0.3 - 6.2 %    Basophil % 0.1 0.0 - 1.5 %    Immature Grans % 0.2 0.0 - 0.5 %    Neutrophils, Absolute 5.99 1.70 - 7.00 10*3/mm3    Lymphocytes, Absolute 1.68 0.70 - 3.10 10*3/mm3    Monocytes, Absolute 0.58 0.10 - 0.90 10*3/mm3    Eosinophils, Absolute 0.11 0.00 - 0.40 10*3/mm3    Basophils, Absolute 0.01 0.00 - 0.20 10*3/mm3    Immature Grans, Absolute 0.02 0.00 - 0.05 10*3/mm3    nRBC 0.0 0.0 - 0.2 /100 WBC   hCG, Serum, Qualitative    Collection Time: 03/09/23  9:15 PM    Specimen: Arm, Right; Blood   Result Value Ref Range    HCG Qualitative Negative Negative   Urinalysis With Microscopic If Indicated (No Culture) - Urine, Clean Catch    Collection Time: 03/09/23 10:08 PM    Specimen: Urine, Clean Catch   Result Value Ref Range    Color, UA Yellow Yellow, Straw    Appearance, UA Clear Clear    pH, UA 5.5 5.0 - 8.0    Specific Gravity, UA 1.025 1.005 - 1.030    Glucose, UA Negative Negative    Ketones, UA 40 mg/dL (2+) (A) Negative    Bilirubin, UA Negative Negative    Blood, UA Negative Negative    Protein, UA Negative Negative    Leuk Esterase, UA Negative Negative    Nitrite, UA Negative Negative    Urobilinogen, UA 0.2 E.U./dL 0.2 - 1.0 E.U./dL       Ordered the above labs and independently reviewed and interpreted the results by me.        RADIOLOGY  CT Abdomen Pelvis Without Contrast    Result Date: 3/10/2023  ET OF THE ABDOMEN AND PELVIS WITHOUT CONTRAST  HISTORY: Left lower quadrant abdominal pain  COMPARISON: 09/20/2022  TECHNIQUE: Axial CT imaging was obtained through the abdomen and pelvis. IV contrast was administered.  FINDINGS: Images through the lung bases are clear. No suspicious hepatic lesions are seen. Gallbladder is absent. The stomach, duodenum, adrenal glands, spleen, and pancreas all appear normal. No hydronephrosis is seen on either side. Punctate  nonobstructing stone is noted within the left kidney, measuring 3 mm. No distal ureteral or bladder stones are seen. Urinary bladder is relatively decompressed. Left ovarian cyst is suspected, measuring approximately 1.5 cm. There is no bowel obstruction. The appendix is normal. The patient has liquid stool within the proximal and transverse colon, as well as some patulous loops of small bowel. Appearance suggests nonspecific enterocolitis. No acute osseous abnormalities are seen. There are shotty mesenteric lymph nodes. These may be reactive.      Liquid stool identified within the ascending and transverse colon, as well as patulous loops of bowel particularly within the lower abdomen. Appearance is favored to represent nonspecific enterocolitis.  Radiation dose reduction techniques were utilized, including automated exposure control and exposure modulation based on body size.  This report was finalized on 3/10/2023 1:07 AM by Dr. Macey Awad M.D.        I ordered the above noted radiological studies.  These were independently interpreted and reviewed by me.  See dictation for official radiology interpretation.      PROCEDURES    Procedures      MEDICATIONS GIVEN IN ER    Medications   sodium chloride 0.9 % flush 10 mL (has no administration in time range)   sodium chloride 0.9 % bolus 1,000 mL (1,000 mL Intravenous New Bag 3/10/23 0006)   ondansetron (ZOFRAN) injection 4 mg (4 mg Intravenous Given 3/10/23 0005)   HYDROmorphone (DILAUDID) injection 0.5 mg (0.5 mg Intravenous Given 3/10/23 0005)         PROGRESS, DATA ANALYSIS, CONSULTS, AND MEDICAL DECISION MAKING    All labs have been independently reviewed by me.  All radiology studies have been reviewed by me and discussed with radiologist dictating the report.   EKG's independently viewed and interpreted by me.  Discussion below represents my analysis of pertinent findings related to patient's condition, differential diagnosis, treatment plan and final  disposition.    DDx:  Includes, but is not limited to pancreatitis, gastritis, hepatitis, GERD, gastroenteritis, colitis, gastroparesis, IBS, interstitial cystitis      ED Course as of 03/10/23 0211   Fri Mar 10, 2023   0055 HCG Qualitative: Negative [KASSANDRA]   0205 Patient rechecked, resting in bed, no acute distress.  Discussed results, diagnosis, and treatment plan.  Patient expressed understanding and agrees with plan. [KASSANDRA]      ED Course User Index  [KASSANDRA] Yahir Healy PA       MDM: Patient's evaluation is unremarkable, patient is tolerating p.o. and improved after IV fluids and antiemetics.  Will discharge with short-term PCP follow-up.    PPE:  The patient wore a mask and I wore a mask and all appropriate PPE throughout the entire patient encounter.      AS OF 02:11 EST VITALS:    BP - 124/79  HR - 70  TEMP - 98.9 °F (37.2 °C) (Tympanic)  O2 SATS - 98%      DIAGNOSIS  Final diagnoses:   Abdominal pain, unspecified abdominal location   Nausea, vomiting, and diarrhea   Gastroparesis         DISPOSITION  DISCHARGE    Patient discharged in stable condition.    Reviewed implications of results, diagnosis, meds, responsibility to follow up, warning signs and symptoms of possible worsening, potential complications and reasons to return to ER.    Patient/Family voiced understanding of above instructions.    Discussed plan for discharge, as there is no emergent indication for admission. Patient referred to primary care provider for BP management due to today's BP. Pt/family is agreeable and understands need for follow up and repeat testing.  Pt is aware that discharge does not mean that nothing is wrong but it indicates no emergency is present that requires admission and they must continue care with follow-up as given below or physician of their choice.     FOLLOW-UP  Cora Palmer, PARenC  [address] [address] [address]  [phone]    Schedule an appointment as soon as possible for a visit             Medication List      New Prescriptions    metoclopramide 10 MG tablet  Commonly known as: REGLAN  Take 1 tablet by mouth Every 6 (Six) Hours As Needed (nausea).     ondansetron ODT 4 MG disintegrating tablet  Commonly known as: ZOFRAN-ODT  Place 1 tablet on the tongue Every 8 (Eight) Hours As Needed for Nausea or Vomiting.           Where to Get Your Medications      These medications were sent to [address] [facility] [phone] [facility] [phone]   [address] [place] [address] [place] [address]    Phone: [phone]   · metoclopramide 10 MG tablet  · ondansetron ODT 4 MG disintegrating tablet           Note Disclaimer: At [facility], we believe that sharing information builds trust and better relationships. You are receiving this note because you recently visited [facility]. It is possible you will see health information before a provider has talked with you about it. This kind of information can be easy to misunderstand. To help you fully understand what it means for your health, we urge you to discuss this note with your provider.     Yahir Healy PA  03/10/23 0211

## 2023-04-03 RX ORDER — ATOGEPANT 60 MG/1
60 TABLET ORAL DAILY
Qty: 90 TABLET | Refills: 3 | Status: SHIPPED | OUTPATIENT
Start: 2023-04-03

## 2023-04-12 ENCOUNTER — PRIOR AUTHORIZATION (OUTPATIENT)
Dept: FAMILY MEDICINE CLINIC | Facility: CLINIC | Age: 38
End: 2023-04-12
Payer: COMMERCIAL

## 2023-05-01 RX ORDER — RIZATRIPTAN BENZOATE 10 MG/1
10 TABLET ORAL ONCE AS NEEDED
Qty: 12 TABLET | Refills: 5 | Status: SHIPPED | OUTPATIENT
Start: 2023-05-01

## 2023-05-15 RX ORDER — FLUOXETINE HYDROCHLORIDE 20 MG/1
20 CAPSULE ORAL DAILY
Qty: 90 CAPSULE | Refills: 0 | Status: SHIPPED | OUTPATIENT
Start: 2023-05-15

## 2023-05-15 RX ORDER — FLUOXETINE 10 MG/1
CAPSULE ORAL
Qty: 30 CAPSULE | Refills: 0 | Status: SHIPPED | OUTPATIENT
Start: 2023-05-15

## 2023-06-08 RX ORDER — TOPIRAMATE 25 MG/1
TABLET ORAL
Qty: 42 TABLET | Refills: 0 | Status: SHIPPED | OUTPATIENT
Start: 2023-06-08

## 2023-06-13 ENCOUNTER — OFFICE VISIT (OUTPATIENT)
Dept: FAMILY MEDICINE CLINIC | Facility: CLINIC | Age: 38
End: 2023-06-13
Payer: COMMERCIAL

## 2023-06-13 VITALS
HEART RATE: 80 BPM | TEMPERATURE: 98.3 F | OXYGEN SATURATION: 97 % | WEIGHT: 172.6 LBS | HEIGHT: 63 IN | DIASTOLIC BLOOD PRESSURE: 79 MMHG | SYSTOLIC BLOOD PRESSURE: 123 MMHG | RESPIRATION RATE: 16 BRPM | BODY MASS INDEX: 30.58 KG/M2

## 2023-06-13 DIAGNOSIS — F32.3 CURRENT SEVERE EPISODE OF MAJOR DEPRESSIVE DISORDER WITH PSYCHOTIC FEATURES, UNSPECIFIED WHETHER RECURRENT: ICD-10-CM

## 2023-06-13 DIAGNOSIS — F41.1 GENERALIZED ANXIETY DISORDER: Primary | ICD-10-CM

## 2023-06-13 PROCEDURE — 93000 ELECTROCARDIOGRAM COMPLETE: CPT | Performed by: PHYSICIAN ASSISTANT

## 2023-06-13 PROCEDURE — 99213 OFFICE O/P EST LOW 20 MIN: CPT | Performed by: PHYSICIAN ASSISTANT

## 2023-06-13 RX ORDER — HYDROXYZINE HYDROCHLORIDE 10 MG/1
TABLET, FILM COATED ORAL
Qty: 45 TABLET | Refills: 5 | Status: SHIPPED | OUTPATIENT
Start: 2023-06-13

## 2023-06-13 RX ORDER — SYRINGE WITH NEEDLE, 1 ML 25GX5/8"
SYRINGE, EMPTY DISPOSABLE MISCELLANEOUS
COMMUNITY
Start: 2023-05-18

## 2023-06-13 RX ORDER — TIRZEPATIDE 5 MG/.5ML
5 INJECTION, SOLUTION SUBCUTANEOUS
COMMUNITY
Start: 2023-05-25

## 2023-06-13 RX ORDER — FLUOXETINE HYDROCHLORIDE 40 MG/1
1 CAPSULE ORAL DAILY
COMMUNITY
Start: 2023-01-15

## 2023-06-13 RX ORDER — BUPROPION HYDROCHLORIDE 150 MG/1
1 TABLET ORAL DAILY
COMMUNITY
Start: 2023-01-15 | End: 2023-06-13

## 2023-06-13 RX ORDER — PRUCALOPRIDE 2 MG/1
1 TABLET, FILM COATED ORAL DAILY
COMMUNITY
Start: 2023-05-01

## 2023-06-13 RX ORDER — CYANOCOBALAMIN 1000 UG/ML
1000 INJECTION, SOLUTION INTRAMUSCULAR; SUBCUTANEOUS
COMMUNITY

## 2023-06-13 NOTE — PROGRESS NOTES
Procedure     ECG 12 Lead    Date/Time: 6/13/2023 11:38 AM  Performed by: Cora Palmer PA-C  Authorized by: Cora Palmer PA-C   Comparison: not compared with previous ECG   Previous ECG: no previous ECG available  Rhythm: sinus rhythm  Rate: normal  BPM: 67  ST Segments: ST segments normal  T Waves: T waves normal  QRS axis: normal  Other: no other findings    Clinical impression: normal ECG  Comments: G done to start Vraylar antipsychotic to check QT interval note this is normal  EKG Interpretation Report    Heart rate:    67 beats/min, GA interval:  138 msec, QRS duration:  90 msec  QTu:410 msec, QTc:  433 msec           Answers submitted by the patient for this visit:  Other (Submitted on 6/8/2023)  Please describe your symptoms.: Extreme depression and anxiety  Have you had these symptoms before?: Yes  How long have you been having these symptoms?: 1-2 weeks  Please list any medications you are currently taking for this condition.: Prozac  Primary Reason for Visit (Submitted on 6/8/2023)  What is the primary reason for your visit?: Other

## 2023-06-13 NOTE — PROGRESS NOTES
Subjective   Adry Knight is a 37 y.o. female.     History of Present Illness   Adry Knight female 37 y.o., There were no vitals taken for this visit.  who presents today for new evaluation and treatment of Depression, Insomnia, and Anxiety.  She reports depressed mood, crying spells, difficulty falling asleep, early AM awakening, fatigue, impaired concentration, suicidal ideation, depressed mood and anxiety, excessive worry, unable to relax, panic feeling, sleep disturbance, decressed appetite, and voices at night when trying to sleep.  No suicidal plan. . Onset of symptoms was approximately 3 weeks ago. She denies current suicidal and homicidal ideation. Risk factors are family history of anxiety and or depression and lifestyle of multiple roles.  Previous treatment includes current Rx and several meds---Prozac best . She complains of the following medication side effects:none. The patient has previously been in counseling..  Concerned that the Wellbutrin may be increasing her anxiety and will put that on hold  I did talk with her on Friday and restarted her Wellbutrin for treatment of depression  She had already stopped taking the Topamax and does not need to taper off and was on this for migraine prevention  Has history of gastroparesis and has been to our GI Dr. Alas and  [facility].  She is on Motegrity with GI.  Reglan is on hold for last week----concern could make depression worse    She is on Mounjaro with weight loss clinic----had labs---thyroid done---started about 3 mos ago    The following portions of the patient's history were reviewed and updated as appropriate: allergies, current medications, past family history, past medical history, past social history, past surgical history, and problem list.    Review of Systems   Constitutional:  Positive for fatigue. Negative for diaphoresis.   HENT:  Negative for nosebleeds and trouble swallowing.    Eyes:  Negative for  blurred vision and visual disturbance.   Respiratory:  Negative for choking.    Gastrointestinal:  Negative for blood in stool.   Allergic/Immunologic: Negative for immunocompromised state.   Neurological:  Negative for facial asymmetry and speech difficulty.   Psychiatric/Behavioral:  Positive for decreased concentration, dysphoric mood, hallucinations, sleep disturbance, suicidal ideas, depressed mood and stress. Negative for self-injury. The patient is nervous/anxious.      Objective   Physical Exam  Vitals and nursing note reviewed.   Constitutional:       General: She is not in acute distress.     Appearance: She is well-developed. She is not ill-appearing or toxic-appearing.   HENT:      Head: Normocephalic.      Right Ear: External ear normal.      Left Ear: External ear normal.      Nose: Nose normal.      Mouth/Throat:      Pharynx: Oropharynx is clear.   Eyes:      General: No scleral icterus.     Conjunctiva/sclera: Conjunctivae normal.      Pupils: Pupils are equal, round, and reactive to light.   Neck:      Thyroid: No thyromegaly.   Cardiovascular:      Rate and Rhythm: Normal rate and regular rhythm.      Heart sounds: Normal heart sounds. No murmur heard.  Pulmonary:      Effort: Pulmonary effort is normal.   Musculoskeletal:         General: No deformity. Normal range of motion.      Cervical back: Normal range of motion and neck supple.   Skin:     General: Skin is warm and dry.      Findings: No rash.   Neurological:      General: No focal deficit present.      Mental Status: She is alert and oriented to person, place, and time. Mental status is at baseline.   Psychiatric:      Comments: Tearful, depressed         Assessment & Plan   Diagnoses and all orders for this visit:    1. Generalized anxiety disorder (Primary)    2. Current severe episode of major depressive disorder with psychotic features, unspecified whether recurrent    Other orders  -     Cariprazine HCl (Vraylar) 1.5 MG capsule  capsule; Take 1 capsule by mouth Daily.  Dispense: 28 capsule; Refill: 0  -     hydrOXYzine (ATARAX) 10 MG tablet; 1-2 tabs PO Q 6 hours prn anxiety  Dispense: 45 tablet; Refill: 5      Go up on Prozac to 40mg  Add hydroxyzine as needed for anxiety and see if this works as well as the lorazepam  Concerned that the Wellbutrin may be making her more anxious and we will put this on hold for now and then can restart after anxiety improves  Very concerned with her suicidal ideation but no plan we will start her on Vraylar today.... Must go to ER if develop suicidal plan  Text me daily update  Will get EKG to check QT with starting Vrayler           Answers for HPI/ROS submitted by the patient on 6/8/2023  Please describe your symptoms.: Extreme depression and anxiety  Have you had these symptoms before?: Yes  How long have you been having these symptoms?: 1-2 weeks  Please list any medications you are currently taking for this condition.: Prozac  What is the primary reason for your visit?: Other

## 2023-06-14 RX ORDER — ONDANSETRON 4 MG/1
4 TABLET, ORALLY DISINTEGRATING ORAL EVERY 8 HOURS PRN
Qty: 30 TABLET | Refills: 11 | Status: SHIPPED | OUTPATIENT
Start: 2023-06-14

## 2023-07-25 ENCOUNTER — PRIOR AUTHORIZATION (OUTPATIENT)
Dept: FAMILY MEDICINE CLINIC | Facility: CLINIC | Age: 38
End: 2023-07-25
Payer: COMMERCIAL

## 2023-08-02 ENCOUNTER — PATIENT MESSAGE (OUTPATIENT)
Dept: FAMILY MEDICINE CLINIC | Facility: CLINIC | Age: 38
End: 2023-08-02
Payer: COMMERCIAL

## 2023-08-06 RX ORDER — FLUOXETINE HYDROCHLORIDE 20 MG/1
CAPSULE ORAL
Qty: 90 CAPSULE | Refills: 0 | OUTPATIENT
Start: 2023-08-06

## 2023-08-07 RX ORDER — VILAZODONE HYDROCHLORIDE 10 MG/1
10 TABLET ORAL DAILY
Qty: 30 TABLET | Refills: 1 | Status: SHIPPED | OUTPATIENT
Start: 2023-08-07 | End: 2023-08-08 | Stop reason: SDUPTHER

## 2023-08-08 RX ORDER — TOPIRAMATE 25 MG/1
TABLET ORAL
Qty: 120 TABLET | Refills: 1 | Status: SHIPPED | OUTPATIENT
Start: 2023-08-08 | End: 2023-08-08

## 2023-08-08 RX ORDER — FLUOXETINE HYDROCHLORIDE 20 MG/1
20 CAPSULE ORAL DAILY
Qty: 30 CAPSULE | Refills: 2 | Status: SHIPPED | OUTPATIENT
Start: 2023-08-08

## 2023-08-08 RX ORDER — TOPIRAMATE 25 MG/1
TABLET ORAL
Qty: 42 TABLET | Refills: 0 | Status: SHIPPED | OUTPATIENT
Start: 2023-08-08

## 2023-08-08 RX ORDER — TOPIRAMATE 25 MG/1
TABLET ORAL
Qty: 42 TABLET | Refills: 0 | Status: SHIPPED | OUTPATIENT
Start: 2023-08-08 | End: 2023-08-08 | Stop reason: SDUPTHER

## 2023-09-12 RX ORDER — TOPIRAMATE 100 MG/1
TABLET, FILM COATED ORAL
Qty: 90 TABLET | Refills: 0 | Status: SHIPPED | OUTPATIENT
Start: 2023-09-12

## 2023-11-01 ENCOUNTER — TELEPHONE (OUTPATIENT)
Dept: GASTROENTEROLOGY | Facility: CLINIC | Age: 38
End: 2023-11-01
Payer: COMMERCIAL

## 2023-11-07 DIAGNOSIS — R35.0 URINARY FREQUENCY: Primary | ICD-10-CM

## 2023-11-10 ENCOUNTER — HOSPITAL ENCOUNTER (EMERGENCY)
Facility: HOSPITAL | Age: 38
Discharge: HOME | End: 2023-11-10
Attending: EMERGENCY MEDICINE
Payer: COMMERCIAL

## 2023-11-10 VITALS
SYSTOLIC BLOOD PRESSURE: 122 MMHG | BODY MASS INDEX: 30.12 KG/M2 | DIASTOLIC BLOOD PRESSURE: 88 MMHG | OXYGEN SATURATION: 100 % | RESPIRATION RATE: 16 BRPM | TEMPERATURE: 97.9 F | HEART RATE: 84 BPM | HEIGHT: 63 IN | WEIGHT: 170 LBS

## 2023-11-10 DIAGNOSIS — R31.9 URINARY TRACT INFECTION WITH HEMATURIA, SITE UNSPECIFIED: Primary | ICD-10-CM

## 2023-11-10 DIAGNOSIS — N32.89 BLADDER SPASM: ICD-10-CM

## 2023-11-10 DIAGNOSIS — N39.0 URINARY TRACT INFECTION WITH HEMATURIA, SITE UNSPECIFIED: Primary | ICD-10-CM

## 2023-11-10 LAB
ALBUMIN SERPL-MCNC: 4.1 G/DL (ref 3.5–5.2)
ALBUMIN/GLOB SERPL: 1.6 G/DL
ALP SERPL-CCNC: 84 U/L (ref 39–117)
ALT SERPL W P-5'-P-CCNC: 74 U/L (ref 1–33)
ANION GAP SERPL CALCULATED.3IONS-SCNC: 9.5 MMOL/L (ref 5–15)
AST SERPL-CCNC: 32 U/L (ref 1–32)
BACTERIA UR QL AUTO: ABNORMAL /HPF
BASOPHILS # BLD AUTO: 0.04 10*3/MM3 (ref 0–0.2)
BASOPHILS NFR BLD AUTO: 0.5 % (ref 0–1.5)
BILIRUB SERPL-MCNC: 0.3 MG/DL (ref 0–1.2)
BILIRUB UR QL STRIP: ABNORMAL
BILIRUB UR QL STRIP: ABNORMAL
BUN SERPL-MCNC: 14 MG/DL (ref 6–20)
BUN/CREAT SERPL: 17.5 (ref 7–25)
CALCIUM SPEC-SCNC: 9.2 MG/DL (ref 8.6–10.5)
CHLORIDE SERPL-SCNC: 103 MMOL/L (ref 98–107)
CLARITY UR: CLEAR
CLARITY UR: CLEAR
CO2 SERPL-SCNC: 24.5 MMOL/L (ref 22–29)
COLOR UR: ABNORMAL
COLOR UR: ABNORMAL
CREAT SERPL-MCNC: 0.8 MG/DL (ref 0.57–1)
DEPRECATED RDW RBC AUTO: 44.6 FL (ref 37–54)
EGFRCR SERPLBLD CKD-EPI 2021: 98.1 ML/MIN/1.73
EOSINOPHIL # BLD AUTO: 0.06 10*3/MM3 (ref 0–0.4)
EOSINOPHIL NFR BLD AUTO: 0.7 % (ref 0.3–6.2)
ERYTHROCYTE [DISTWIDTH] IN BLOOD BY AUTOMATED COUNT: 13 % (ref 12.3–15.4)
GLOBULIN UR ELPH-MCNC: 2.5 GM/DL
GLUCOSE SERPL-MCNC: 96 MG/DL (ref 65–99)
GLUCOSE UR STRIP-MCNC: ABNORMAL MG/DL
GLUCOSE UR STRIP-MCNC: ABNORMAL MG/DL
HCG SERPL QL: NEGATIVE
HCT VFR BLD AUTO: 43.5 % (ref 34–46.6)
HGB BLD-MCNC: 14 G/DL (ref 12–15.9)
HGB UR QL STRIP.AUTO: ABNORMAL
HGB UR QL STRIP.AUTO: ABNORMAL
HYALINE CASTS UR QL AUTO: ABNORMAL /LPF
IMM GRANULOCYTES # BLD AUTO: 0.01 10*3/MM3 (ref 0–0.05)
IMM GRANULOCYTES NFR BLD AUTO: 0.1 % (ref 0–0.5)
KETONES UR QL STRIP: NEGATIVE
KETONES UR QL STRIP: NEGATIVE
LEUKOCYTE ESTERASE UR QL STRIP.AUTO: NEGATIVE
LEUKOCYTE ESTERASE UR QL STRIP.AUTO: NEGATIVE
LYMPHOCYTES # BLD AUTO: 2.13 10*3/MM3 (ref 0.7–3.1)
LYMPHOCYTES NFR BLD AUTO: 24.5 % (ref 19.6–45.3)
MCH RBC QN AUTO: 29.7 PG (ref 26.6–33)
MCHC RBC AUTO-ENTMCNC: 32.2 G/DL (ref 31.5–35.7)
MCV RBC AUTO: 92.4 FL (ref 79–97)
MONOCYTES # BLD AUTO: 0.39 10*3/MM3 (ref 0.1–0.9)
MONOCYTES NFR BLD AUTO: 4.5 % (ref 5–12)
NEUTROPHILS NFR BLD AUTO: 6.05 10*3/MM3 (ref 1.7–7)
NEUTROPHILS NFR BLD AUTO: 69.7 % (ref 42.7–76)
NITRITE UR QL STRIP: POSITIVE
NITRITE UR QL STRIP: POSITIVE
PH UR STRIP.AUTO: <=5 [PH] (ref 5–8)
PH UR STRIP.AUTO: <=5 [PH] (ref 5–8)
PLATELET # BLD AUTO: 264 10*3/MM3 (ref 140–450)
PMV BLD AUTO: 10.2 FL (ref 6–12)
POTASSIUM SERPL-SCNC: 3.3 MMOL/L (ref 3.5–5.2)
PROT SERPL-MCNC: 6.6 G/DL (ref 6–8.5)
PROT UR QL STRIP: ABNORMAL
PROT UR QL STRIP: ABNORMAL
RBC # BLD AUTO: 4.71 10*6/MM3 (ref 3.77–5.28)
RBC # UR STRIP: ABNORMAL /HPF
REF LAB TEST METHOD: ABNORMAL
SODIUM SERPL-SCNC: 137 MMOL/L (ref 136–145)
SP GR UR STRIP: 1.02 (ref 1–1.03)
SP GR UR STRIP: 1.02 (ref 1–1.03)
SQUAMOUS #/AREA URNS HPF: ABNORMAL /HPF
TRANS CELLS #/AREA URNS HPF: ABNORMAL /HPF
UROBILINOGEN UR QL STRIP: ABNORMAL
UROBILINOGEN UR QL STRIP: ABNORMAL
WBC # UR STRIP: ABNORMAL /HPF
WBC NRBC COR # BLD: 8.68 10*3/MM3 (ref 3.4–10.8)

## 2023-11-10 PROCEDURE — 85025 COMPLETE CBC W/AUTO DIFF WBC: CPT | Performed by: PHYSICIAN ASSISTANT

## 2023-11-10 PROCEDURE — 81001 URINALYSIS AUTO W/SCOPE: CPT | Performed by: PHYSICIAN ASSISTANT

## 2023-11-10 PROCEDURE — 80053 COMPREHEN METABOLIC PANEL: CPT | Performed by: PHYSICIAN ASSISTANT

## 2023-11-10 PROCEDURE — 84703 CHORIONIC GONADOTROPIN ASSAY: CPT | Performed by: PHYSICIAN ASSISTANT

## 2023-11-10 PROCEDURE — 99282 EMERGENCY DEPT VISIT SF MDM: CPT

## 2023-11-10 PROCEDURE — 87086 URINE CULTURE/COLONY COUNT: CPT | Performed by: PHYSICIAN ASSISTANT

## 2023-11-10 RX ORDER — CEPHALEXIN 500 MG/1
500 CAPSULE ORAL 3 TIMES DAILY
Qty: 21 CAPSULE | Refills: 0 | Status: SHIPPED | OUTPATIENT
Start: 2023-11-10 | End: 2023-11-17

## 2023-11-10 RX ORDER — PHENAZOPYRIDINE HYDROCHLORIDE 200 MG/1
200 TABLET, FILM COATED ORAL 3 TIMES DAILY PRN
Qty: 5 TABLET | Refills: 0 | Status: SHIPPED | OUTPATIENT
Start: 2023-11-10 | End: 2023-11-12

## 2023-11-10 NOTE — FSED PROVIDER NOTE
Subjective   History of Present Illness    Patient is complaining of dysuria and urinary frequency which started 3 days ago.  She reports that she has an ongoing refill prescription of antibiotics for her recurrent UTIs.  She started Macrobid yesterday.  She reports that her current symptoms feel slightly different than her previous UTIs, she reports that over-the-counter Azo usually alleviates the discomfort but has had persistent dysuria.  In addition, she is complaining of bilateral low back pain.    Review of Systems   Constitutional:  Negative for activity change and appetite change.   Eyes:  Negative for pain.   Respiratory:  Negative for shortness of breath.    Gastrointestinal:  Negative for nausea and vomiting.   Genitourinary:  Positive for dysuria, frequency and urgency.   Musculoskeletal:  Negative for arthralgias.   Skin:  Negative for color change.   Neurological:  Negative for dizziness.   All other systems reviewed and are negative.      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    Hernia     Irritable bowel syndrome     Lactose intolerance     LFT elevation     Low back pain     Migraines     Scoliosis     Shingles     Urinary tract infection     Wears glasses        Allergies   Allergen Reactions    Chocolate Anaphylaxis and GI Intolerance    Contrast Dye (Echo Or Unknown Ct/Mr) Angioedema     TONGUE NUMBNESS    Beef-Derived Products GI Intolerance    Gelatin GI Intolerance    Rice Allergy Skin Test GI Intolerance       Past Surgical History:   Procedure Laterality Date    CHOLECYSTECTOMY  10/23/18    CHOLECYSTECTOMY WITH INTRAOPERATIVE CHOLANGIOGRAM N/A 10/24/2018    Procedure: LAPAROSCOPIC  CHOLECYSTECTOMY WITH INTRAOPERATIVE CHOLANGIOGRAM, POSSIBLE OPEN;  Surgeon: Zuleima Johansen MD;  Location:  [facility];  Service: General    COLONOSCOPY N/A 2/4/2020    Procedure: COLONOSCOPY to cecum;  Surgeon: Marleni Alas MD;  Location:  [facility];  Service: Gastroenterology;  Laterality: N/A;  pre - abd pain  post - hemorrhoids    ENDOSCOPY N/A 2/4/2020    Procedure: ESOPHAGOGASTRODUODENOSCOPY with cold bx;  Surgeon: Marleni Alas MD;  Location:  [facility];  Service: Gastroenterology;  Laterality: N/A;  pre - gerd, abd pain  post - gastritis    TONSILLECTOMY      UPPER GASTROINTESTINAL ENDOSCOPY N/A 09/21/2015    Normal esophagus, Normal stomach, Normal duodenum, Dr. Marleni Alas    VAGINAL DELIVERY N/A 10/08/2012    Dr. Brittney Wesley       Family History   Problem Relation Age of Onset    Depression Mother     Irritable bowel syndrome Mother     Alcohol abuse Mother     Asthma Mother     Anxiety disorder Mother     Arthritis Mother     Cancer Sister     Celiac disease Sister     Cancer Paternal Grandmother     Celiac disease Sister     Gallbladder disease Sister     Irritable bowel syndrome Sister     Irritable bowel syndrome Maternal Grandmother     Alcohol abuse Maternal Grandmother     Depression Maternal Grandmother     Mental illness Maternal Grandmother     Alcohol abuse Brother     Asthma Brother     Malig Hyperthermia Neg Hx        Social History     Socioeconomic History    Marital status:    Tobacco Use    Smoking status: Never    Smokeless tobacco: Never   Vaping Use    Vaping Use: Never used   Substance and Sexual Activity    Alcohol use: No    Drug use: No    Sexual activity: Yes     Partners: Male     Birth control/protection: Condom           Objective   Physical Exam  Vitals and nursing note reviewed.   Constitutional:       Appearance: Normal appearance. She is normal weight.   HENT:      Head: Normocephalic and atraumatic.      Nose: Nose normal.       Mouth/Throat:      Mouth: Mucous membranes are moist.   Eyes:      Pupils: Pupils are equal, round, and reactive to light.   Cardiovascular:      Rate and Rhythm: Normal rate and regular rhythm.      Pulses: Normal pulses.      Heart sounds: Normal heart sounds.   Pulmonary:      Effort: Pulmonary effort is normal.      Breath sounds: Normal breath sounds.   Musculoskeletal:         General: Normal range of motion.      Cervical back: Normal range of motion.      Right lower leg: No edema.      Left lower leg: No edema.   Skin:     General: Skin is warm.   Neurological:      General: No focal deficit present.      Mental Status: She is alert.   Psychiatric:         Behavior: Behavior is cooperative.         Procedures           ED Course  ED Course as of 11/11/23 1034   Fri Nov 10, 2023   1755 Potassium(!): 3.3 [SS]      ED Course User Index  [SS] Sarah Thompson PA-C                                           Medical Decision Making  Problems Addressed:  Bladder spasm: complicated acute illness or injury  Urinary tract infection with hematuria, site unspecified: complicated acute illness or injury    Amount and/or Complexity of Data Reviewed  Labs: ordered. Decision-making details documented in ED Course.    Risk  Prescription drug management.        Final diagnoses:   Urinary tract infection with hematuria, site unspecified   Bladder spasm       ED Disposition  ED Disposition       ED Disposition   Discharge    Condition   Stable    Comment   --               Cora Palmer PA-C  [address] [address]  [phone]               Medication List        New Prescriptions      cephalexin 500 MG capsule  Commonly known as: KEFLEX  Take 1 capsule by mouth 3 (Three) Times a Day for 7 days.     phenazopyridine 200 MG tablet  Commonly known as: PYRIDIUM  Take 1 tablet by mouth 3 (Three) Times a Day As Needed for Bladder Spasms for up to 2 days.            Stop      nitrofurantoin  (macrocrystal-monohydrate) 100 MG capsule  Commonly known as: MACROBID               Where to Get Your Medications        These medications were sent to [id number] - [facility], [place] - [address] - [phone]  - [phone]   [address] [facility] [place] [address]      Phone: [phone]   cephalexin 500 MG capsule  phenazopyridine 200 MG tablet

## 2023-11-10 NOTE — DISCHARGE INSTRUCTIONS
Light activity for the next 2 to 3 days.  Stop the Macrobid and start the Keflex.  Expect a call from us in 2 to 3 days regarding your urine culture results.  Do not take the Pyridium longer than 2 days.  If you develop any worsening or concerning symptoms, return to the ER.

## 2023-11-11 LAB — BACTERIA SPEC AEROBE CULT: ABNORMAL

## 2023-11-12 DIAGNOSIS — E78.2 HYPERLIPIDEMIA, MIXED: ICD-10-CM

## 2023-11-12 DIAGNOSIS — E53.8 LOW SERUM VITAMIN B12: ICD-10-CM

## 2023-11-12 DIAGNOSIS — E55.9 VITAMIN D DEFICIENCY: ICD-10-CM

## 2023-11-12 DIAGNOSIS — F33.42 RECURRENT MAJOR DEPRESSIVE DISORDER, IN FULL REMISSION: ICD-10-CM

## 2023-11-12 DIAGNOSIS — F41.1 GENERALIZED ANXIETY DISORDER: ICD-10-CM

## 2023-11-12 DIAGNOSIS — R79.89 ELEVATED LFTS: Primary | ICD-10-CM

## 2023-11-30 LAB
25(OH)D3+25(OH)D2 SERPL-MCNC: 39.7 NG/ML (ref 30–100)
ALBUMIN SERPL-MCNC: 4.6 G/DL (ref 3.5–5.2)
ALBUMIN/GLOB SERPL: 2 G/DL
ALP SERPL-CCNC: 80 U/L (ref 39–117)
ALT SERPL-CCNC: 46 U/L (ref 1–33)
APPEARANCE UR: ABNORMAL
AST SERPL-CCNC: 27 U/L (ref 1–32)
BACTERIA #/AREA URNS HPF: ABNORMAL /HPF
BASOPHILS # BLD AUTO: 0.08 10*3/MM3 (ref 0–0.2)
BASOPHILS NFR BLD AUTO: 1.2 % (ref 0–1.5)
BILIRUB SERPL-MCNC: 0.6 MG/DL (ref 0–1.2)
BILIRUB UR QL STRIP: NEGATIVE
BUN SERPL-MCNC: 13 MG/DL (ref 6–20)
BUN/CREAT SERPL: 16 (ref 7–25)
CALCIUM SERPL-MCNC: 9.6 MG/DL (ref 8.6–10.5)
CASTS URNS MICRO: ABNORMAL
CHLORIDE SERPL-SCNC: 104 MMOL/L (ref 98–107)
CHOLEST SERPL-MCNC: 200 MG/DL (ref 0–200)
CO2 SERPL-SCNC: 25.9 MMOL/L (ref 22–29)
COLOR UR: YELLOW
CREAT SERPL-MCNC: 0.81 MG/DL (ref 0.57–1)
EGFRCR SERPLBLD CKD-EPI 2021: 96.6 ML/MIN/1.73
EOSINOPHIL # BLD AUTO: 0.08 10*3/MM3 (ref 0–0.4)
EOSINOPHIL NFR BLD AUTO: 1.2 % (ref 0.3–6.2)
EPI CELLS #/AREA URNS HPF: ABNORMAL /HPF
ERYTHROCYTE [DISTWIDTH] IN BLOOD BY AUTOMATED COUNT: 13.1 % (ref 12.3–15.4)
FOLATE SERPL-MCNC: 4.89 NG/ML (ref 4.78–24.2)
GLOBULIN SER CALC-MCNC: 2.3 GM/DL
GLUCOSE SERPL-MCNC: 79 MG/DL (ref 65–99)
GLUCOSE UR QL STRIP: NEGATIVE
HBA1C MFR BLD: 5 % (ref 4.8–5.6)
HCT VFR BLD AUTO: 44.4 % (ref 34–46.6)
HDLC SERPL-MCNC: 51 MG/DL (ref 40–60)
HGB BLD-MCNC: 14.8 G/DL (ref 12–15.9)
HGB UR QL STRIP: NEGATIVE
IMM GRANULOCYTES # BLD AUTO: 0.01 10*3/MM3 (ref 0–0.05)
IMM GRANULOCYTES NFR BLD AUTO: 0.2 % (ref 0–0.5)
KETONES UR QL STRIP: NEGATIVE
LDLC SERPL CALC-MCNC: 135 MG/DL (ref 0–100)
LEUKOCYTE ESTERASE UR QL STRIP: ABNORMAL
LYMPHOCYTES # BLD AUTO: 1.66 10*3/MM3 (ref 0.7–3.1)
LYMPHOCYTES NFR BLD AUTO: 25.7 % (ref 19.6–45.3)
MCH RBC QN AUTO: 30.9 PG (ref 26.6–33)
MCHC RBC AUTO-ENTMCNC: 33.3 G/DL (ref 31.5–35.7)
MCV RBC AUTO: 92.7 FL (ref 79–97)
MONOCYTES # BLD AUTO: 0.35 10*3/MM3 (ref 0.1–0.9)
MONOCYTES NFR BLD AUTO: 5.4 % (ref 5–12)
NEUTROPHILS # BLD AUTO: 4.27 10*3/MM3 (ref 1.7–7)
NEUTROPHILS NFR BLD AUTO: 66.3 % (ref 42.7–76)
NITRITE UR QL STRIP: NEGATIVE
NRBC BLD AUTO-RTO: 0 /100 WBC (ref 0–0.2)
PH UR STRIP: 8 [PH] (ref 5–8)
PLATELET # BLD AUTO: 292 10*3/MM3 (ref 140–450)
POTASSIUM SERPL-SCNC: 4.1 MMOL/L (ref 3.5–5.2)
PROT SERPL-MCNC: 6.9 G/DL (ref 6–8.5)
PROT UR QL STRIP: NEGATIVE
RBC # BLD AUTO: 4.79 10*6/MM3 (ref 3.77–5.28)
RBC #/AREA URNS HPF: ABNORMAL /HPF
SODIUM SERPL-SCNC: 139 MMOL/L (ref 136–145)
SP GR UR STRIP: 1.02 (ref 1–1.03)
T4 FREE SERPL-MCNC: 1.23 NG/DL (ref 0.93–1.7)
TRIGL SERPL-MCNC: 76 MG/DL (ref 0–150)
TSH SERPL DL<=0.005 MIU/L-ACNC: 2.15 UIU/ML (ref 0.27–4.2)
UROBILINOGEN UR STRIP-MCNC: ABNORMAL MG/DL
VIT B12 SERPL-MCNC: 546 PG/ML (ref 211–946)
VLDLC SERPL CALC-MCNC: 14 MG/DL (ref 5–40)
WBC # BLD AUTO: 6.45 10*3/MM3 (ref 3.4–10.8)
WBC #/AREA URNS HPF: ABNORMAL /HPF

## 2023-12-01 LAB
HAV IGM SERPL QL IA: NEGATIVE
HBV CORE IGM SERPL QL IA: NEGATIVE
HBV SURFACE AG SERPL QL IA: NEGATIVE
HCV AB SERPL QL IA: NORMAL
HCV IGG SERPL QL IA: NON REACTIVE
WRITTEN AUTHORIZATION: NORMAL

## 2023-12-03 LAB
APPEARANCE UR: ABNORMAL
BACTERIA #/AREA URNS HPF: ABNORMAL /HPF
BACTERIA UR CULT: NORMAL
BACTERIA UR CULT: NORMAL
BILIRUB UR QL STRIP: NEGATIVE
CASTS URNS QL MICRO: ABNORMAL /LPF
COLOR UR: YELLOW
CRYSTALS URNS MICRO: ABNORMAL
EPI CELLS #/AREA URNS HPF: >10 /HPF (ref 0–10)
GLUCOSE UR QL STRIP: NEGATIVE
HGB UR QL STRIP: NEGATIVE
KETONES UR QL STRIP: NEGATIVE
LEUKOCYTE ESTERASE UR QL STRIP: ABNORMAL
MICRO URNS: ABNORMAL
NITRITE UR QL STRIP: NEGATIVE
PH UR STRIP: 8 [PH] (ref 5–7.5)
PROT UR QL STRIP: NEGATIVE
RBC #/AREA URNS HPF: ABNORMAL /HPF (ref 0–2)
SP GR UR STRIP: 1.02 (ref 1–1.03)
UNIDENT CRYS URNS QL MICRO: PRESENT /LPF
URINALYSIS REFLEX: ABNORMAL
UROBILINOGEN UR STRIP-MCNC: 0.2 MG/DL (ref 0.2–1)
WBC #/AREA URNS HPF: ABNORMAL /HPF (ref 0–5)

## 2023-12-07 ENCOUNTER — PATIENT MESSAGE (OUTPATIENT)
Dept: FAMILY MEDICINE CLINIC | Facility: CLINIC | Age: 38
End: 2023-12-07
Payer: COMMERCIAL

## 2023-12-07 RX ORDER — AZITHROMYCIN 250 MG/1
TABLET, FILM COATED ORAL
Qty: 6 TABLET | Refills: 1 | Status: SHIPPED | OUTPATIENT
Start: 2023-12-07

## 2023-12-07 NOTE — TELEPHONE ENCOUNTER
From: Adry Knight  To: Cora Palmer  Sent: 12/7/2023 8:14 AM EST  Subject: Pneumonia     Hi Cora, I woke up startng to feel bad this morning. Since jana has walking pneumonia could you call me in a zpak as well.

## 2024-01-23 RX ORDER — TOPIRAMATE 100 MG/1
TABLET, FILM COATED ORAL
Qty: 90 TABLET | Refills: 0 | Status: SHIPPED | OUTPATIENT
Start: 2024-01-23

## 2024-01-23 NOTE — TELEPHONE ENCOUNTER
Rx Refill Note  Requested Prescriptions     Pending Prescriptions Disp Refills    topiramate (TOPAMAX) 100 MG tablet [Pharmacy Med Name: Topiramate 100 MG Oral Tablet] 90 tablet 0     Sig: TAKE 1 TABLET BY MOUTH ONCE DAILY FOR MIGRAINE HEADACHE      Last office visit with prescribing clinician: 6/13/2023   Last telemedicine visit with prescribing clinician: Visit date not found   Next office visit with prescribing clinician: Visit date not found                         Would you like a call back once the refill request has been completed: [] Yes [] No    If the office needs to give you a call back, can they leave a voicemail: [] Yes [] No    Lise Perez MA  01/23/24, 14:01 EST

## 2024-03-13 ENCOUNTER — PATIENT MESSAGE (OUTPATIENT)
Dept: FAMILY MEDICINE CLINIC | Facility: CLINIC | Age: 39
End: 2024-03-13
Payer: COMMERCIAL

## 2024-03-18 RX ORDER — TOPIRAMATE 100 MG/1
TABLET, FILM COATED ORAL
Qty: 90 TABLET | Refills: 0 | Status: SHIPPED | OUTPATIENT
Start: 2024-03-18

## 2024-03-22 RX ORDER — LEVOFLOXACIN 250 MG/1
250 TABLET, FILM COATED ORAL DAILY
Qty: 5 TABLET | Refills: 0 | Status: SHIPPED | OUTPATIENT
Start: 2024-03-22

## 2024-03-22 NOTE — TELEPHONE ENCOUNTER
From: Adry Knight  Sent: 3/21/2024 8:06 PM EDT  To: [facility] [facility]  Subject: Uti    I'm not sure what you would like to do from here but I cannot take this antibiotic for another day.. I feel more sick everyday that I take it.. I was so naseaous, tired and just felt like I could pass out all day while trying to work. I have 3 more days of antibiotic left.

## 2024-04-08 ENCOUNTER — OFFICE VISIT (OUTPATIENT)
Dept: FAMILY MEDICINE CLINIC | Facility: CLINIC | Age: 39
End: 2024-04-08
Payer: COMMERCIAL

## 2024-04-08 VITALS
WEIGHT: 183 LBS | BODY MASS INDEX: 32.43 KG/M2 | HEART RATE: 72 BPM | OXYGEN SATURATION: 99 % | HEIGHT: 63 IN | SYSTOLIC BLOOD PRESSURE: 114 MMHG | DIASTOLIC BLOOD PRESSURE: 84 MMHG

## 2024-04-08 DIAGNOSIS — G43.719 INTRACTABLE CHRONIC MIGRAINE WITHOUT AURA AND WITHOUT STATUS MIGRAINOSUS: ICD-10-CM

## 2024-04-08 DIAGNOSIS — R82.90 ABNORMAL URINALYSIS: Primary | ICD-10-CM

## 2024-04-08 DIAGNOSIS — K59.09 CHRONIC CONSTIPATION: ICD-10-CM

## 2024-04-08 DIAGNOSIS — F33.42 RECURRENT MAJOR DEPRESSIVE DISORDER, IN FULL REMISSION: ICD-10-CM

## 2024-04-08 DIAGNOSIS — Z14.8 HEMOCHROMATOSIS CARRIER: ICD-10-CM

## 2024-04-08 DIAGNOSIS — R79.89 LFT ELEVATION: ICD-10-CM

## 2024-04-08 DIAGNOSIS — F41.9 ANXIETY: ICD-10-CM

## 2024-04-08 PROCEDURE — 99214 OFFICE O/P EST MOD 30 MIN: CPT | Performed by: PHYSICIAN ASSISTANT

## 2024-04-08 RX ORDER — TOPIRAMATE 100 MG/1
100 TABLET, FILM COATED ORAL DAILY
Qty: 90 TABLET | Refills: 3 | Status: SHIPPED | OUTPATIENT
Start: 2024-04-08

## 2024-04-08 RX ORDER — FLUOXETINE HYDROCHLORIDE 40 MG/1
40 CAPSULE ORAL DAILY
Qty: 90 CAPSULE | Refills: 3 | Status: SHIPPED | OUTPATIENT
Start: 2024-04-08

## 2024-04-08 RX ORDER — RIZATRIPTAN BENZOATE 10 MG/1
10 TABLET ORAL ONCE AS NEEDED
Qty: 12 TABLET | Refills: 11 | Status: SHIPPED | OUTPATIENT
Start: 2024-04-08

## 2024-04-08 NOTE — PROGRESS NOTES
"Subjective   Adry Knight is a 38 y.o. female.     Anxiety  Symptoms include nausea.           Since the last visit, she has overall felt fairly well.  She has GERD controlled on PPI Rx, Vitamin D deficiency and labs are at goal >30 ng/mL, Migraine headaches and responding well to PRN triptan or CGPR inhibitor, and Migraine headaches and well controlled on suppression medication.  she has been compliant with current medications have reviewed them.  The patient denies medication side effects.  Will refill medications. /84   Pulse 72   Ht 160 cm (63\")   Wt 83 kg (183 lb)   SpO2 99%   BMI 32.42 kg/m² .  BMI is >= 30 and <35. (Class 1 Obesity). The following options were offered after discussion;: exercise counseling/recommendations  She went to urgent care for UTI 3/11 and 3/22/2024 the culture was positive the cephalexin was changed to Septra DS and she had extreme GI upset and could not tolerate.... She contacted me and I sent her Levaquin and symptoms resolved  Her point-of-care urine did show blood I will follow-up with urine micro  On Linzess for constipation--sees GI  Has been to [facility]  Sees Vinay QUIROS for ADD only  Plan to follow-up on the elevated LFTs also noting she is a hemochromatosis carrier I did a lab for this after she found out family history I am concerned that she did not show fatty liver on prior CT abdomen and pelvis and need her to also see hematology for further workup and refer to GI.  Adry Knight female 38 y.o., /84   Pulse 72   Ht 160 cm (63\")   Wt 83 kg (183 lb)   SpO2 99%   BMI 32.42 kg/m²   who presents today for follow up of Depression and Anxiety.  She reports medication is working well, patient desires to continue on Rx, and needs refill. Onset of symptoms was approximately several years ago. She denies current suicidal and homicidal ideation. Risk factors are family history of anxiety and or depression and lifestyle of " multiple roles.  Previous treatment includes current Rx. She complains of the following medication side effects:none. The patient is currently in counseling..    Results for orders placed or performed during the hospital encounter of 03/22/24   Beta Strep Culture, Throat - Swab, Throat    Specimen: Throat; Swab   Result Value Ref Range    Beta Strep Gp A Culture Negative    POC Rapid Strep A    Specimen: Swab   Result Value Ref Range    Rapid Strep A Screen Negative     Internal Control Passed     Lot Number #[id number]     Expiration Date 02/27/25 11/30/2023  7:55 AM EST       Urine studies are now in normal range.  No further work-up is needed.  Liver enzyme ALT remains elevated.... We will add acute hepatitis panel for protocol testing.  Advised patient follow-up with GI specialist.... Concern for fatty liver disease although her last CT scan in March did not indicate fatty liver changes.  Needs further work-up for elevated.  Folic acid in lower range of normal and add over-the-counter folic acid any dose 400 mcg daily or higher.  Mild elevation of your LDL cholesterol work on diet and exercise.  Hemoglobin A1c average glucose in normal range.  Other labs in acceptable range.  Patient needs to call GI specialist for appointment         The following portions of the patient's history were reviewed and updated as appropriate: allergies, current medications, past family history, past medical history, past social history, past surgical history, and problem list.    Review of Systems   Constitutional:  Negative for chills.   Gastrointestinal:  Positive for nausea.   Genitourinary:  Positive for dysuria and frequency. Negative for hematuria.   Neurological:  Positive for headache.       Objective   Physical Exam  Vitals and nursing note reviewed.   Constitutional:       General: She is not in acute distress.     Appearance: She is well-developed. She is not ill-appearing or toxic-appearing.   HENT:      Head:  Normocephalic.      Right Ear: External ear normal.      Left Ear: External ear normal.      Nose: Nose normal.      Mouth/Throat:      Pharynx: Oropharynx is clear.   Eyes:      General: No scleral icterus.     Conjunctiva/sclera: Conjunctivae normal.      Pupils: Pupils are equal, round, and reactive to light.   Neck:      Thyroid: No thyromegaly.   Cardiovascular:      Rate and Rhythm: Normal rate and regular rhythm.      Heart sounds: Normal heart sounds. No murmur heard.  Pulmonary:      Effort: Pulmonary effort is normal. No respiratory distress.      Breath sounds: Normal breath sounds. No rales.   Musculoskeletal:         General: No deformity. Normal range of motion.      Cervical back: Normal range of motion and neck supple.   Skin:     General: Skin is warm and dry.      Findings: No rash.   Neurological:      General: No focal deficit present.      Mental Status: She is alert and oriented to person, place, and time. Mental status is at baseline.   Psychiatric:         Mood and Affect: Mood normal.         Behavior: Behavior normal.         Thought Content: Thought content normal.         Judgment: Judgment normal.           Assessment & Plan   Diagnoses and all orders for this visit:    1. Abnormal urinalysis (Primary)  -     Urinalysis With Microscopic - Urine, Clean Catch    2. Chronic constipation    3. Intractable chronic migraine without aura and without status migrainosus    4. Anxiety    5. Recurrent major depressive disorder, in full remission    6. Hemochromatosis carrier  -     Ambulatory Referral to Hematology / Oncology  -     Ambulatory Referral to Gastroenterology  -     US Liver  -     Hepatic Function Panel (6) ([facility])    7. LFT elevation  -     Ambulatory Referral to Gastroenterology  -     US Liver  -     Hepatic Function Panel (6) ([facility])    Other orders  -     rizatriptan (MAXALT) 10 MG tablet; Take 1 tablet by mouth 1 (One) Time As Needed for Migraine for up to 1 dose. May repeat  in 2 hours if needed  Dispense: 12 tablet; Refill: 11  -     topiramate (TOPAMAX) 100 MG tablet; Take 1 tablet by mouth Daily. For migraine suppression  Dispense: 90 tablet; Refill: 3  -     FLUoxetine (PROzac) 40 MG capsule; Take 1 capsule by mouth Daily. For stress  Dispense: 90 capsule; Refill: 3        Could not tolerate Septra DS  Ordering urine micro due to blood present on point-of-care urine at urgent care  Continues on Linzess for your chronic constipation and does help  Monitor his diet closely due to history of gastroparesis  Sees [facility] forwarded for the ADD and remains on stimulant and does work  Plan, Adry DYER Eugene, was seen today.  she was seen for Vitamin D deficiency and will update labs , Migraine headaches and will continue with their PRN triptan or CGRP inhibitor, and Migraine headaches and well controlled on their suppressive medication.  Zofran works as needed for nausea with migraine  Will order liver ultrasound I am concerned that she is a hemochromatosis carrier and no prior note of fatty liver on CT will get a dedicated liver ultrasound... Place another referral to hematology for that workup and have her see GI... Ask for appointment with Shirley Curtis PA-C  Prozac 40 mg daily is working well for anxiety and depression  Declines Tdap  Sees GYN         Answers submitted by the patient for this visit:  Primary Reason for Visit (Submitted on 4/1/2024)  What is the primary reason for your visit?: Painful Urination

## 2024-05-01 RX ORDER — SYRINGE WITH NEEDLE, 1 ML 25GX5/8"
SYRINGE, EMPTY DISPOSABLE MISCELLANEOUS
Qty: 30 EACH | Refills: 1 | Status: SHIPPED | OUTPATIENT
Start: 2024-05-01

## 2024-05-01 RX ORDER — CYANOCOBALAMIN 1000 UG/ML
1000 INJECTION, SOLUTION INTRAMUSCULAR; SUBCUTANEOUS ONCE
Qty: 10 ML | Refills: 5 | Status: SHIPPED | OUTPATIENT
Start: 2024-05-01 | End: 2024-05-01

## 2024-06-03 ENCOUNTER — OFFICE VISIT (OUTPATIENT)
Dept: GASTROENTEROLOGY | Facility: CLINIC | Age: 39
End: 2024-06-03
Payer: COMMERCIAL

## 2024-06-03 VITALS
HEART RATE: 80 BPM | SYSTOLIC BLOOD PRESSURE: 119 MMHG | TEMPERATURE: 97.8 F | WEIGHT: 186.4 LBS | BODY MASS INDEX: 31.82 KG/M2 | HEIGHT: 64 IN | DIASTOLIC BLOOD PRESSURE: 85 MMHG

## 2024-06-03 DIAGNOSIS — K31.84 GASTROPARESIS: ICD-10-CM

## 2024-06-03 DIAGNOSIS — K21.9 GASTROESOPHAGEAL REFLUX DISEASE WITHOUT ESOPHAGITIS: ICD-10-CM

## 2024-06-03 DIAGNOSIS — K59.09 CHRONIC CONSTIPATION: Primary | ICD-10-CM

## 2024-06-03 DIAGNOSIS — R79.89 ELEVATED LFTS: ICD-10-CM

## 2024-06-03 DIAGNOSIS — K59.04 CHRONIC IDIOPATHIC CONSTIPATION: ICD-10-CM

## 2024-06-03 PROCEDURE — 99214 OFFICE O/P EST MOD 30 MIN: CPT

## 2024-06-03 NOTE — Clinical Note
38-year-old female with history of gastroparesis and elevated LFTs.  She was noted to be a hemochromatosis carrier on labs back in 2020 and was also noted to have low alpha-1 antitrypsin with MZ phenotype identified.  Most recent LFTs that show ALT mildly elevated at 46 and AST/T. bili within normal limits.  Iron and ferritin recently within normal limits.  She does have a liver ultrasound pending.  Do think we need to do any further workup? W/ her current numbers I wanted to see if a liver biopsy was necessary or if we can just watch since she is just a carrier?

## 2024-06-03 NOTE — PROGRESS NOTES
"Chief Complaint  Abdominal Pain and Constipation    Subjective          History of Present Illness    Adry Knight is a  38 y.o. female presents for follow-up.  She is a patient of Dr. Alas and is new to me.  Last seen in the office 10/13/2022 by Shirley Curtis PA-C.  She has a history of chronic abdominal pain, GERD, and constipation.    She has been noted to have intermittently elevated ALT dating back to 2016.  Most recently ALT noted to be 46 in November 2023.  AST and T. bili are within normal limits.  Patient was noted to have low alpha-1 antitrypsin back in 2020 with a value of 92.  Phenotype MZ was identified.  She was also told that she was a carrier of hemochromatosis gene.    She reports that she does have a family history of cirrhosis in her paternal uncle.  She states overall she has been doing okay.  With regards to gastroparesis she does remain nauseous most of the time.  She does try to eat small meals throughout the day and does tolerate this.  She denies any vomiting.  She does report that water typically makes her sick and states that she can only tolerate Coke.  She has tried carbonated water and flavored water but reports that this makes her feel more nauseous as well.  She reports that she is still having issues with constipation despite Linzess 72 and is hopeful to try higher dose.  She denies any black or bloody stool or unintentional weight loss.    Most recent iron studies and ferritin within normal limits.    She has seen the motility clinic-Dr. Augustine.  Last office visit was February 2023.    Objective   Vital Signs:   /85   Pulse 80   Temp 97.8 °F (36.6 °C) (Temporal)   Ht 161.5 cm (63.6\")   Wt 84.6 kg (186 lb 6.4 oz)   BMI 32.40 kg/m²       Physical Exam  Constitutional:       General: She is not in acute distress.     Appearance: Normal appearance.   Eyes:      General: No scleral icterus.  Cardiovascular:      Rate and Rhythm: Normal rate.   Pulmonary:      " Effort: Pulmonary effort is normal.   Abdominal:      General: Abdomen is flat. Bowel sounds are normal. There is no distension.      Tenderness: There is no abdominal tenderness. There is no guarding.   Skin:     Coloration: Skin is not jaundiced.   Neurological:      General: No focal deficit present.      Mental Status: She is alert and oriented to person, place, and time.   Psychiatric:         Mood and Affect: Mood normal.         Behavior: Behavior normal.          Result Review :   The following data was reviewed by: Olivia Mendosa PA-C on 06/03/2024:  CMP          11/10/2023    16:42 11/29/2023    09:44   CMP   Glucose 96  79    BUN 14  13    Creatinine 0.80  0.81    EGFR 98.1     Sodium 137  139    Potassium 3.3  4.1    Chloride 103  104    Calcium 9.2  9.6    Total Protein  6.9    Total Protein 6.6     Albumin 4.1  4.6    Globulin  2.3    Globulin 2.5     Total Bilirubin 0.3  0.6    Alkaline Phosphatase 84  80    AST (SGOT) 32  27    ALT (SGPT) 74  46    Albumin/Globulin Ratio 1.6     BUN/Creatinine Ratio 17.5  16.0    Anion Gap 9.5       CBC          11/10/2023    16:42 11/29/2023    09:44   CBC   WBC 8.68  6.45    RBC 4.71  4.79    Hemoglobin 14.0  14.8    Hematocrit 43.5  44.4    MCV 92.4  92.7    MCH 29.7  30.9    MCHC 32.2  33.3    RDW 13.0  13.1    Platelets 264  292              Assessment:   Diagnoses and all orders for this visit:    1. Chronic constipation (Primary)    2. Chronic idiopathic constipation  -     linaclotide (Linzess) 145 MCG capsule capsule; Take 1 capsule by mouth Every Morning Before Breakfast.  Dispense: 90 capsule; Refill: 3    3. Gastroesophageal reflux disease without esophagitis    4. Gastroparesis    5. Elevated LFTs          Plan:   -Will increase Linzess to 145  -Liver ultrasound ordered and pending-will await results. Pt following w/ hematology as well.   -Advise she continue on low residue diet for gastroparesis -could consider referral to nutritionist.  She does  tolerate smoothies well so discussed this is an option to get hydration and nutrition.      Follow Up   No follow-ups on file.    Dragon dictation used throughout this note.         Olivia Mendosa PA-C  [facility]  [address] [place] [address]  Office: [phone]

## 2024-06-28 NOTE — PROGRESS NOTES
Follow-up ([facility])        REASON FOR CONSULTATION:   Hemochromatosis carrier                           REQUESTING PHYSICIAN: Cora Palmer PA-C  RECORDS OBTAINED:  Records of the patients history including those from the electronic medical record were reviewed and summarized in detail.    HISTORY OF PRESENT ILLNESS:  The patient is a 38 y.o. year old female  Who was found to be hemochromatosis carrier.  Referred to our clinic for further evaluation management.  She reports her sister who is a NICU nurse at [employer] [employer] also is hemochromatosis carrier.  Patient reports she was tested due to her elevated liver enzymes.  No other family history of hemochromatosis.  She reports her paternal uncle  of liver cancer recently.      Past Medical History:   Diagnosis Date    AAT (alpha-1-antitrypsin) deficiency 2020    Allergic     Anxiety     Biliary dyskinesia     BMI 31.0-31.9,adult 2021    COVID-19 2021    COVID-19     Depression     Dysphagia     Gastroparesis     GERD (gastroesophageal reflux disease)     Headache     Hemochromatosis carrier 2020    Hernia     Irritable bowel syndrome     Lactose intolerance     LFT elevation     Low back pain     Migraines     Scoliosis     Shingles     Urinary tract infection     Wears glasses      Past Surgical History:   Procedure Laterality Date    CHOLECYSTECTOMY  10/23/2018    CHOLECYSTECTOMY WITH INTRAOPERATIVE CHOLANGIOGRAM N/A 10/24/2018    Procedure: LAPAROSCOPIC CHOLECYSTECTOMY WITH INTRAOPERATIVE CHOLANGIOGRAM, POSSIBLE OPEN;  Surgeon: Zuleima Johansen MD;  [place] [facility] [place] [facility] [place]: General    COLONOSCOPY N/A 2020    Procedure: COLONOSCOPY to cecum;  Surgeon: Marleni Alas MD;  Location: [facility] ENDOSCOPY;  Service: Gastroenterology;  Laterality: N/A;  pre - abd pain  post - hemorrhoids    COLONOSCOPY  2020    ENDOSCOPY N/A 2020    Procedure: ESOPHAGOGASTRODUODENOSCOPY with cold bx;  Surgeon: Evette  "Marleni NIETO MD;  Location: [facility] ENDOSCOPY;  Service: Gastroenterology;  Laterality: N/A;  pre - gerd, abd pain  post - gastritis    TONSILLECTOMY      UPPER GASTROINTESTINAL ENDOSCOPY N/A 09/21/2015    Normal esophagus, Normal stomach, Normal duodenum, Dr. Marleni Alas    VAGINAL DELIVERY N/A 10/08/2012    Dr. Brittney Wesley       MEDICATIONS    Current Outpatient Medications:     amphetamine-dextroamphetamine XR (ADDERALL XR) 20 MG 24 hr capsule, Take 1 capsule by mouth Daily, Disp: , Rfl:     cyanocobalamin 1000 MCG/ML injection, INJECT 1 ML (CC) INTO THE APPROPRIATE MUSCLE AS DIRECTED BY PRESCRIBER ONCE EVERY 14 DAYS., Disp: , Rfl:     FLUoxetine (PROzac) 40 MG capsule, Take 1 capsule by mouth Daily. For stress, Disp: 90 capsule, Rfl: 3    ibuprofen (ADVIL,MOTRIN) 200 MG tablet, Take 1 tablet by mouth Every 6 (Six) Hours As Needed for Mild Pain., Disp: , Rfl:     linaclotide (Linzess) 145 MCG capsule capsule, Take 1 capsule by mouth Every Morning Before Breakfast., Disp: 90 capsule, Rfl: 3    LORazepam (Ativan) 0.5 MG tablet, Take 1 tablet by mouth Every 8 (Eight) Hours As Needed for Anxiety., Disp: 45 tablet, Rfl: 0    ondansetron ODT (ZOFRAN-ODT) 4 MG disintegrating tablet, Place 1 tablet on the tongue Every 8 (Eight) Hours As Needed for Nausea or Vomiting., Disp: 30 tablet, Rfl: 11    rizatriptan (MAXALT) 10 MG tablet, Take 1 tablet by mouth 1 (One) Time As Needed for Migraine for up to 1 dose. May repeat in 2 hours if needed, Disp: 12 tablet, Rfl: 11    Syringe/Needle, Disp, (B-D 3CC LUER-YVONNE SYR 23GX1\") 23G X 1\" 3 ML misc, 1 mL of B12 IM every 14 days, Disp: 30 each, Rfl: 1    topiramate (TOPAMAX) 100 MG tablet, Take 1 tablet by mouth Daily. For migraine suppression, Disp: 90 tablet, Rfl: 3    traZODone (DESYREL) 50 MG tablet, Take 1-2 tablets by mouth At Night As Needed. for sleep, Disp: , Rfl:     adZENys XR-ODT 15.7 MG Tablet Extended Release Dispersible, DISSOLVE 1 TABLET BY MOUTH DAILY., Disp: , Rfl:     " "levocetirizine (XYZAL) 5 MG tablet, Take 1 tablet by mouth Every Evening for 30 days., Disp: 30 tablet, Rfl: 0    ALLERGIES:     Allergies   Allergen Reactions    Chocolate Anaphylaxis and GI Intolerance    Contrast Dye (Echo Or Unknown Ct/Mr) Angioedema     TONGUE NUMBNESS    Sulfa Antibiotics Nausea And Vomiting    Beef-Derived Products GI Intolerance    Gelatin GI Intolerance    Rice Allergy Skin Test GI Intolerance       SOCIAL HISTORY:       Social History     Socioeconomic History    Marital status:    Tobacco Use    Smoking status: Never    Smokeless tobacco: Never   Vaping Use    Vaping status: Never Used   Substance and Sexual Activity    Alcohol use: No    Drug use: No    Sexual activity: Yes     Partners: Male     Birth control/protection: Condom         FAMILY HISTORY:  Family History   Problem Relation Age of Onset    Depression Mother     Irritable bowel syndrome Mother     Alcohol abuse Mother     Asthma Mother     Anxiety disorder Mother     Arthritis Mother     Cancer Sister     Celiac disease Sister     Cancer Paternal Grandmother     Celiac disease Sister     Gallbladder disease Sister     Irritable bowel syndrome Sister     Irritable bowel syndrome Maternal Grandmother     Alcohol abuse Maternal Grandmother     Depression Maternal Grandmother     Mental illness Maternal Grandmother     Alcohol abuse Brother     Asthma Brother     Malig Hyperthermia Neg Hx        REVIEW OF SYSTEMS:  Review of Systems   Constitutional: Negative.    Respiratory: Negative.     Cardiovascular: Negative.    Gastrointestinal: Negative.    Genitourinary: Negative.    Neurological:  Positive for headaches (Having migraine during clinic visit).   Hematological: Negative.               Vitals:    07/01/24 1342   BP: 138/94   Pulse: 77   Resp: 16   Temp: 98.1 °F (36.7 °C)   TempSrc: Oral   SpO2: 99%   Weight: 84.7 kg (186 lb 12.8 oz)   Height: 161.5 cm (63.58\")   PainSc: 0-No pain         7/1/2024     1:47 PM "   Current Status   ECOG score 0        PHYSICAL EXAM:    CONSTITUTIONAL:  Vital signs reviewed.  No distress, looks comfortable.  RESPIRATORY:  Normal respiratory effort.  Lungs clear to auscultation bilaterally.  CARDIOVASCULAR:  Normal S1, S2.  No murmurs rubs or gallops.  No significant lower extremity edema.  GASTROINTESTINAL: Abdomen appears unremarkable.  Nontender.   SKIN:  Warm.  No rashes.  PSYCHIATRIC:  Normal judgment and insight.  Normal mood and affect.     RECENT LABS:        WBC   Date Value Ref Range Status   07/01/2024 6.67 3.40 - 10.80 10*3/mm3 Final   11/29/2023 6.45 3.40 - 10.80 10*3/mm3 Final   11/10/2023 8.68 3.40 - 10.80 10*3/mm3 Final   03/09/2023 8.39 3.40 - 10.80 10*3/mm3 Final   08/26/2022 7.5 3.4 - 10.8 x10E3/uL Final   12/07/2021 6.7 3.4 - 10.8 x10E3/uL Final   07/16/2020 7.52 3.40 - 10.80 10*3/mm3 Final   01/13/2020 9.37 3.40 - 10.80 10*3/mm3 Final   10/23/2019 6.34 3.40 - 10.80 10*3/mm3 Final   01/25/2019 6.42 4.50 - 10.70 10*3/mm3 Final   11/27/2017 7.40 4.50 - 10.70 10*3/mm3 Final   02/20/2017 6.53 4.50 - 10.70 10*3/mm3 Final   06/03/2016 6.73 4.50 - 10.70 10*3/mm3 Final   03/02/2015 7.69 4.50 - 10.70 K/Cumm Final     Hemoglobin   Date Value Ref Range Status   07/01/2024 15.3 12.0 - 15.9 g/dL Final   11/29/2023 14.8 12.0 - 15.9 g/dL Final   11/10/2023 14.0 12.0 - 15.9 g/dL Final   03/09/2023 16.5 (H) 12.0 - 15.9 g/dL Final   08/26/2022 13.5 11.1 - 15.9 g/dL Final   12/07/2021 15.0 11.1 - 15.9 g/dL Final   07/16/2020 15.2 12.0 - 15.9 g/dL Final   01/13/2020 14.6 12.0 - 15.9 g/dL Final   10/23/2019 13.9 12.0 - 15.9 g/dL Final   01/25/2019 14.4 11.9 - 15.5 g/dL Final   11/27/2017 13.8 11.9 - 15.5 g/dL Final   02/20/2017 14.4 11.9 - 15.5 g/dL Final   06/03/2016 13.2 11.9 - 15.5 g/dL Final   03/02/2015 14.7 11.9 - 15.5 g/dL Final     Platelets   Date Value Ref Range Status   07/01/2024 230 140 - 450 10*3/mm3 Final   11/29/2023 292 140 - 450 10*3/mm3 Final   11/10/2023 264 140 - 450  10*3/mm3 Final   03/09/2023 340 140 - 450 10*3/mm3 Final   08/26/2022 229 150 - 450 x10E3/uL Final   12/07/2021 261 150 - 450 x10E3/uL Final   07/16/2020 235 140 - 450 10*3/mm3 Final   01/13/2020 219 140 - 450 10*3/mm3 Final   10/23/2019 213 140 - 450 10*3/mm3 Final   01/25/2019 238 140 - 500 10*3/mm3 Final   11/27/2017 273 140 - 500 10*3/mm3 Final   02/20/2017 239 140 - 500 10*3/mm3 Final   06/03/2016 233 140 - 500 10*3/mm3 Final   03/02/2015 246 140 - 500 K/Cumm Final     Hemochromatosis Gene Comment   Comment: Result: Two mutations (H63D and S65C) identified  Interpretation:   This patient's sample was analyzed for the hereditary hemochromatosis   (HH) mutations C282Y, H63D, and S65C.  One copy of H63D and one copy   of S65C were identified.  Results for C282Y were negative.  The   mutations analyzed by [employer] are most common in the    population.  The diagnosis of HH should not rely on DNA testing alone   because not all people with H63D/S65C develop clinical symptoms of HH.   Diagnosis of HH should include clinical findings and other test   results, such as transferrin-iron saturation and/or serum ferritin   and/or liver biopsy.  HH is inherited in a recessive manner.  All   offspring from this patient will be carriers, and other family members   are at increased risk.  Genetic counseling and HH molecular testing   are recommended for at-risk family members.     Assessment:  *Hereditary hemochromatosis, H63D/S65C  -Reviewed diagnosis of HH with pt, including HFE gene physiology, genetics, and significance of her mutation (increased intestinal iron absorption). Discussed potential sequela of increased iron, such as deposition on organs resulting in organ damage. Reviewed dietary implications-does not necessarily need to change diet, or avoid iron specifically. However, recommended against any additional iron supplements/vitamins. Also counseled pt on importance of avoiding alcohol and additive effects  of EtOH on ferritin/iron stores    Plan  -Will obtain ferritin today  -Return to clinic in 6 months with CBC, CMP, ferritin

## 2024-07-01 ENCOUNTER — CONSULT (OUTPATIENT)
Dept: ONCOLOGY | Facility: CLINIC | Age: 39
End: 2024-07-01
Payer: COMMERCIAL

## 2024-07-01 ENCOUNTER — LAB (OUTPATIENT)
Dept: LAB | Facility: HOSPITAL | Age: 39
End: 2024-07-01
Payer: COMMERCIAL

## 2024-07-01 VITALS
WEIGHT: 186.8 LBS | RESPIRATION RATE: 16 BRPM | HEIGHT: 64 IN | BODY MASS INDEX: 31.89 KG/M2 | HEART RATE: 77 BPM | DIASTOLIC BLOOD PRESSURE: 94 MMHG | OXYGEN SATURATION: 99 % | SYSTOLIC BLOOD PRESSURE: 138 MMHG | TEMPERATURE: 98.1 F

## 2024-07-01 DIAGNOSIS — Z14.8 HEMOCHROMATOSIS CARRIER: Primary | ICD-10-CM

## 2024-07-01 LAB
BASOPHILS # BLD AUTO: 0.06 10*3/MM3 (ref 0–0.2)
BASOPHILS NFR BLD AUTO: 0.9 % (ref 0–1.5)
DEPRECATED RDW RBC AUTO: 41.5 FL (ref 37–54)
EOSINOPHIL # BLD AUTO: 0.06 10*3/MM3 (ref 0–0.4)
EOSINOPHIL NFR BLD AUTO: 0.9 % (ref 0.3–6.2)
ERYTHROCYTE [DISTWIDTH] IN BLOOD BY AUTOMATED COUNT: 12.7 % (ref 12.3–15.4)
FERRITIN SERPL-MCNC: 53.6 NG/ML (ref 13–150)
HCT VFR BLD AUTO: 45.6 % (ref 34–46.6)
HGB BLD-MCNC: 15.3 G/DL (ref 12–15.9)
IMM GRANULOCYTES # BLD AUTO: 0.03 10*3/MM3 (ref 0–0.05)
IMM GRANULOCYTES NFR BLD AUTO: 0.4 % (ref 0–0.5)
LYMPHOCYTES # BLD AUTO: 1.98 10*3/MM3 (ref 0.7–3.1)
LYMPHOCYTES NFR BLD AUTO: 29.7 % (ref 19.6–45.3)
MCH RBC QN AUTO: 29.8 PG (ref 26.6–33)
MCHC RBC AUTO-ENTMCNC: 33.6 G/DL (ref 31.5–35.7)
MCV RBC AUTO: 88.9 FL (ref 79–97)
MONOCYTES # BLD AUTO: 0.49 10*3/MM3 (ref 0.1–0.9)
MONOCYTES NFR BLD AUTO: 7.3 % (ref 5–12)
NEUTROPHILS NFR BLD AUTO: 4.05 10*3/MM3 (ref 1.7–7)
NEUTROPHILS NFR BLD AUTO: 60.8 % (ref 42.7–76)
NRBC BLD AUTO-RTO: 0 /100 WBC (ref 0–0.2)
PLATELET # BLD AUTO: 230 10*3/MM3 (ref 140–450)
PMV BLD AUTO: 10.7 FL (ref 6–12)
RBC # BLD AUTO: 5.13 10*6/MM3 (ref 3.77–5.28)
WBC NRBC COR # BLD AUTO: 6.67 10*3/MM3 (ref 3.4–10.8)

## 2024-07-01 PROCEDURE — 85025 COMPLETE CBC W/AUTO DIFF WBC: CPT

## 2024-07-01 PROCEDURE — 36415 COLL VENOUS BLD VENIPUNCTURE: CPT

## 2024-07-01 PROCEDURE — 82728 ASSAY OF FERRITIN: CPT

## 2024-07-01 PROCEDURE — 99203 OFFICE O/P NEW LOW 30 MIN: CPT | Performed by: STUDENT IN AN ORGANIZED HEALTH CARE EDUCATION/TRAINING PROGRAM

## 2024-07-01 RX ORDER — TRAZODONE HYDROCHLORIDE 50 MG/1
50-100 TABLET ORAL NIGHTLY PRN
COMMUNITY
Start: 2024-06-11

## 2024-07-01 RX ORDER — DEXTROAMPHETAMINE SACCHARATE, AMPHETAMINE ASPARTATE MONOHYDRATE, DEXTROAMPHETAMINE SULFATE AND AMPHETAMINE SULFATE 5; 5; 5; 5 MG/1; MG/1; MG/1; MG/1
1 CAPSULE, EXTENDED RELEASE ORAL DAILY
COMMUNITY
Start: 2024-06-20

## 2024-07-01 RX ORDER — CYANOCOBALAMIN 1000 UG/ML
INJECTION, SOLUTION INTRAMUSCULAR; SUBCUTANEOUS
COMMUNITY
Start: 2024-06-26

## 2024-07-15 ENCOUNTER — HOSPITAL ENCOUNTER (OUTPATIENT)
Facility: HOSPITAL | Age: 39
Discharge: HOME | End: 2024-07-15
Payer: COMMERCIAL

## 2024-07-22 ENCOUNTER — HOSPITAL ENCOUNTER (OUTPATIENT)
Facility: HOSPITAL | Age: 39
Discharge: HOME | End: 2024-07-22
Admitting: PHYSICIAN ASSISTANT
Payer: COMMERCIAL

## 2024-07-22 PROCEDURE — 76705 ECHO EXAM OF ABDOMEN: CPT

## 2024-08-02 ENCOUNTER — HOSPITAL ENCOUNTER (EMERGENCY)
Facility: HOSPITAL | Age: 39
Discharge: HOME | End: 2024-08-02
Attending: EMERGENCY MEDICINE
Payer: COMMERCIAL

## 2024-08-02 VITALS
TEMPERATURE: 97.9 F | RESPIRATION RATE: 16 BRPM | OXYGEN SATURATION: 95 % | HEIGHT: 63 IN | SYSTOLIC BLOOD PRESSURE: 127 MMHG | BODY MASS INDEX: 31.01 KG/M2 | WEIGHT: 175 LBS | HEART RATE: 71 BPM | DIASTOLIC BLOOD PRESSURE: 89 MMHG

## 2024-08-02 DIAGNOSIS — K21.9 GASTROESOPHAGEAL REFLUX DISEASE, UNSPECIFIED WHETHER ESOPHAGITIS PRESENT: ICD-10-CM

## 2024-08-02 DIAGNOSIS — J02.9 SORE THROAT: Primary | ICD-10-CM

## 2024-08-02 LAB
ALBUMIN SERPL-MCNC: 4.2 G/DL (ref 3.5–5.2)
ALBUMIN/GLOB SERPL: 1.7 G/DL
ALP SERPL-CCNC: 78 U/L (ref 39–117)
ALT SERPL W P-5'-P-CCNC: 31 U/L (ref 1–33)
ANION GAP SERPL CALCULATED.3IONS-SCNC: 4.8 MMOL/L (ref 5–15)
AST SERPL-CCNC: 20 U/L (ref 1–32)
BASOPHILS # BLD AUTO: 0.04 10*3/MM3 (ref 0–0.2)
BASOPHILS NFR BLD AUTO: 0.8 % (ref 0–1.5)
BILIRUB SERPL-MCNC: 0.5 MG/DL (ref 0–1.2)
BUN SERPL-MCNC: 11 MG/DL (ref 6–20)
BUN/CREAT SERPL: 12.5 (ref 7–25)
CALCIUM SPEC-SCNC: 8.8 MG/DL (ref 8.6–10.5)
CHLORIDE SERPL-SCNC: 107 MMOL/L (ref 98–107)
CO2 SERPL-SCNC: 26.2 MMOL/L (ref 22–29)
CREAT SERPL-MCNC: 0.88 MG/DL (ref 0.57–1)
DEPRECATED RDW RBC AUTO: 42.5 FL (ref 37–54)
EGFRCR SERPLBLD CKD-EPI 2021: 87.5 ML/MIN/1.73
EOSINOPHIL # BLD AUTO: 0.09 10*3/MM3 (ref 0–0.4)
EOSINOPHIL NFR BLD AUTO: 1.8 % (ref 0.3–6.2)
ERYTHROCYTE [DISTWIDTH] IN BLOOD BY AUTOMATED COUNT: 12.5 % (ref 12.3–15.4)
GLOBULIN UR ELPH-MCNC: 2.5 GM/DL
GLUCOSE SERPL-MCNC: 96 MG/DL (ref 65–99)
HCG SERPL QL: NEGATIVE
HCT VFR BLD AUTO: 44.8 % (ref 34–46.6)
HGB BLD-MCNC: 14.7 G/DL (ref 12–15.9)
IMM GRANULOCYTES # BLD AUTO: 0 10*3/MM3 (ref 0–0.05)
IMM GRANULOCYTES NFR BLD AUTO: 0 % (ref 0–0.5)
LYMPHOCYTES # BLD AUTO: 1.45 10*3/MM3 (ref 0.7–3.1)
LYMPHOCYTES NFR BLD AUTO: 28.7 % (ref 19.6–45.3)
MCH RBC QN AUTO: 30.1 PG (ref 26.6–33)
MCHC RBC AUTO-ENTMCNC: 32.8 G/DL (ref 31.5–35.7)
MCV RBC AUTO: 91.8 FL (ref 79–97)
MONOCYTES # BLD AUTO: 0.32 10*3/MM3 (ref 0.1–0.9)
MONOCYTES NFR BLD AUTO: 6.3 % (ref 5–12)
NEUTROPHILS NFR BLD AUTO: 3.15 10*3/MM3 (ref 1.7–7)
NEUTROPHILS NFR BLD AUTO: 62.4 % (ref 42.7–76)
PLATELET # BLD AUTO: 241 10*3/MM3 (ref 140–450)
PMV BLD AUTO: 10.1 FL (ref 6–12)
POTASSIUM SERPL-SCNC: 3.7 MMOL/L (ref 3.5–5.2)
PROT SERPL-MCNC: 6.7 G/DL (ref 6–8.5)
RBC # BLD AUTO: 4.88 10*6/MM3 (ref 3.77–5.28)
SODIUM SERPL-SCNC: 138 MMOL/L (ref 136–145)
STREP A PCR: NOT DETECTED
WBC NRBC COR # BLD AUTO: 5.05 10*3/MM3 (ref 3.4–10.8)

## 2024-08-02 PROCEDURE — 85025 COMPLETE CBC W/AUTO DIFF WBC: CPT | Performed by: NURSE PRACTITIONER

## 2024-08-02 PROCEDURE — 80053 COMPREHEN METABOLIC PANEL: CPT | Performed by: NURSE PRACTITIONER

## 2024-08-02 PROCEDURE — 84703 CHORIONIC GONADOTROPIN ASSAY: CPT | Performed by: NURSE PRACTITIONER

## 2024-08-02 PROCEDURE — 36415 COLL VENOUS BLD VENIPUNCTURE: CPT

## 2024-08-02 PROCEDURE — 99284 EMERGENCY DEPT VISIT MOD MDM: CPT | Performed by: NURSE PRACTITIONER

## 2024-08-02 PROCEDURE — 99283 EMERGENCY DEPT VISIT LOW MDM: CPT

## 2024-08-02 PROCEDURE — 87651 STREP A DNA AMP PROBE: CPT | Performed by: EMERGENCY MEDICINE

## 2024-08-02 PROCEDURE — G0463 HOSPITAL OUTPT CLINIC VISIT: HCPCS | Performed by: NURSE PRACTITIONER

## 2024-08-02 RX ORDER — SODIUM CHLORIDE 0.9 % (FLUSH) 0.9 %
10 SYRINGE (ML) INJECTION AS NEEDED
Status: DISCONTINUED | OUTPATIENT
Start: 2024-08-02 | End: 2024-08-02 | Stop reason: HOSPADM

## 2024-08-02 RX ORDER — DIPHENHYDRAMINE HYDROCHLORIDE 50 MG/ML
25 INJECTION INTRAMUSCULAR; INTRAVENOUS
Status: DISCONTINUED | OUTPATIENT
Start: 2024-08-02 | End: 2024-08-02

## 2024-08-02 RX ORDER — METHYLPREDNISOLONE SODIUM SUCCINATE 125 MG/2ML
125 INJECTION, POWDER, LYOPHILIZED, FOR SOLUTION INTRAMUSCULAR; INTRAVENOUS ONCE
Status: DISCONTINUED | OUTPATIENT
Start: 2024-08-02 | End: 2024-08-02

## 2024-08-02 RX ORDER — SUCRALFATE ORAL 1 G/10ML
1 SUSPENSION ORAL 3 TIMES DAILY
Qty: 900 ML | Refills: 0 | Status: SHIPPED | OUTPATIENT
Start: 2024-08-02 | End: 2024-09-01

## 2024-08-02 RX ORDER — PANTOPRAZOLE SODIUM 40 MG/1
40 TABLET, DELAYED RELEASE ORAL DAILY
Qty: 30 TABLET | Refills: 0 | Status: SHIPPED | OUTPATIENT
Start: 2024-08-02

## 2024-08-02 NOTE — DISCHARGE INSTRUCTIONS
Resume Protonix and start Carafate    I recommend taking Adderal as capsule and stop opening them up.      Return Precautions    Although you are being discharged from the ED today, I encourage you to return for worsening symptoms.  Things can, and do, change such that treatment at home with medication may not be adequate.      Specifically, return for any of the following:    Chest pain, shortness of breath, pain or nausea and vomiting not controlled by medications provided.    Please make a follow up with your Primary Care Provider for a blood pressure recheck.

## 2024-08-02 NOTE — FSED PROVIDER NOTE
EMERGENCY DEPARTMENT ENCOUNTER    Room Number:  06/06  Date seen:  8/2/2024  Time seen: 10:17 EDT  PCP: Cora Palmer, SMITA  Historian: Patient    Discussed/obtained information from independent historians: Not applicable    HPI:  Chief complaint: Sore throat  A complete HPI/ROS/PMH/PSH/SH/FH are unobtainable due to: Not applicable  Context:Adry Knight is a 38 y.o. female with history of migraine headaches, gastroparesis, GERD who presents to the ED with c/o several weeks of moderate right-sided sore throat, painful swallowing.  She reports that she finally had a day where she was available to come seek medical treatment.  She reports having increasing problems swallowing her saliva and reports that she usually sticks to a very soft diet due to her severe gastroparesis.  She is able to swallow fluids and denies any fever, chills, altered voice.  No known ill contacts with strep throat.  Has history of a tonsillectomy.    External (non-ED) record review: I reviewed recent oncology office visit for hemochromatosis carrier.  I also reviewed a gastroenterology office visit with Dr. Augustine on 2/28/2023.    Chronic or social conditions impacting care: Not applicable    ALLERGIES  Chocolate, Contrast dye (echo or unknown ct/mr), Sulfa antibiotics, Beef-derived products, Gelatin, and Rice allergy skin test    PAST MEDICAL HISTORY  Active Ambulatory Problems     Diagnosis Date Noted    Intractable migraine with aura without status migrainosus 11/11/2015    Headache, migraine 11/11/2015    Burning or prickling sensation 11/11/2015    Anxiety 11/11/2015    Depression 11/11/2015    Low back pain without sciatica 01/04/2016    Neck pain 01/04/2016    Pain of left scapula 02/01/2016    Chronic constipation 05/31/2019    Gastroesophageal reflux disease without esophagitis 05/31/2019    Intractable chronic migraine without aura 01/06/2020    Abdominal bloating 01/09/2020    Generalized abdominal pain 01/09/2020     AAT (alpha-1-antitrypsin) deficiency 07/14/2020    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    Close exposure to COVID-19 virus 09/14/2020    COVID-19 11/14/2021    BMI 31.0-31.9,adult 11/14/2021     Resolved Ambulatory Problems     Diagnosis Date Noted    Biliary dyskinesia 10/23/2018     Past Medical History:   Diagnosis Date    Allergic     Dysphagia     Gastroparesis     GERD (gastroesophageal reflux disease)     Headache     Hernia     Irritable bowel syndrome     Lactose intolerance     LFT elevation     Low back pain     Migraines     Scoliosis     Shingles     Urinary tract infection     Wears glasses        PAST SURGICAL HISTORY  Past Surgical History:   Procedure Laterality Date    CHOLECYSTECTOMY  10/23/2018    CHOLECYSTECTOMY WITH INTRAOPERATIVE CHOLANGIOGRAM N/A 10/24/2018    Procedure: LAPAROSCOPIC CHOLECYSTECTOMY WITH INTRAOPERATIVE CHOLANGIOGRAM, POSSIBLE OPEN;  Surgeon: Zuleima Johansen MD;  [place]  [facility] [place] [facility] [place]: [place]    COLONOSCOPY N/A 02/04/2020    Procedure: COLONOSCOPY to cecum;  Surgeon: Marleni Alas MD;  Location:  [facility] ENDOSCOPY;  Service: Gastroenterology;  Laterality: N/A;  pre - abd pain  post - hemorrhoids    COLONOSCOPY  2/4/2020    ENDOSCOPY N/A 02/04/2020    Procedure: ESOPHAGOGASTRODUODENOSCOPY with cold bx;  Surgeon: Marleni Alas MD;  Location:  [facility] ENDOSCOPY;  Service: Gastroenterology;  Laterality: N/A;  pre - gerd, abd pain  post - gastritis    TONSILLECTOMY      UPPER GASTROINTESTINAL ENDOSCOPY N/A 09/21/2015    Normal esophagus, Normal stomach, Normal duodenum, Dr. Marleni Alas    VAGINAL DELIVERY N/A 10/08/2012    Dr. Brittney Wesley       FAMILY HISTORY  Family History   Problem Relation Age of Onset    Depression Mother     Irritable bowel syndrome Mother     Alcohol abuse Mother     Asthma Mother     Anxiety disorder Mother     Arthritis Mother     Cancer Sister     Celiac disease Sister     Cancer Paternal Grandmother     Celiac disease Sister      Gallbladder disease Sister     Irritable bowel syndrome Sister     Irritable bowel syndrome Maternal Grandmother     Alcohol abuse Maternal Grandmother     Depression Maternal Grandmother     Mental illness Maternal Grandmother     Alcohol abuse Brother     Asthma Brother     Malig Hyperthermia Neg Hx        SOCIAL HISTORY  Social History     Socioeconomic History    Marital status:    Tobacco Use    Smoking status: Never    Smokeless tobacco: Never   Vaping Use    Vaping status: Never Used   Substance and Sexual Activity    Alcohol use: No    Drug use: No    Sexual activity: Yes     Partners: Male     Birth control/protection: Condom       REVIEW OF SYSTEMS  Review of Systems    All systems reviewed and negative except for those discussed in HPI.     PHYSICAL EXAM    I have reviewed the triage vital signs and nursing notes.  Vitals:    08/02/24 1116   BP:    Pulse: 71   Resp:    Temp:    SpO2: 95%     Physical Exam  HENT:      Mouth/Throat:      Lips: Pink.      Mouth: Mucous membranes are moist.      Tongue: No lesions.      Pharynx: Oropharynx is clear. Uvula midline. No pharyngeal swelling, oropharyngeal exudate, posterior oropharyngeal erythema or uvula swelling.      Tonsils: No tonsillar exudate or tonsillar abscesses.        Comments: Pt with h/o tonsillectomy.  The right tonsillar area has a pale appearance and I wonder if it is some type of ulcer?      Voice is normal  No trismus  No drooling.         GENERAL: not distressed.  Well dressed and groomed.   HENT: nares patent; see above  EYES: no scleral icterus  NECK: no ROM limitations  CV: regular rhythm, regular rate  RESPIRATORY: normal effort  ABDOMEN: soft  : deferred  MUSCULOSKELETAL: no deformity  NEURO: alert, moves all extremities, follows commands  SKIN: warm, dry    LAB RESULTS  Recent Results (from the past 24 hour(s))   Rapid Strep A Screen - Swab, Throat    Collection Time: 08/02/24 10:14 AM    Specimen: Throat; Swab   Result Value  Ref Range    STREP A PCR Not Detected Not Detected   Comprehensive Metabolic Panel    Collection Time: 08/02/24 10:33 AM    Specimen: Blood   Result Value Ref Range    Glucose 96 65 - 99 mg/dL    BUN 11 6 - 20 mg/dL    Creatinine 0.88 0.57 - 1.00 mg/dL    Sodium 138 136 - 145 mmol/L    Potassium 3.7 3.5 - 5.2 mmol/L    Chloride 107 98 - 107 mmol/L    CO2 26.2 22.0 - 29.0 mmol/L    Calcium 8.8 8.6 - 10.5 mg/dL    Total Protein 6.7 6.0 - 8.5 g/dL    Albumin 4.2 3.5 - 5.2 g/dL    ALT (SGPT) 31 1 - 33 U/L    AST (SGOT) 20 1 - 32 U/L    Alkaline Phosphatase 78 39 - 117 U/L    Total Bilirubin 0.5 0.0 - 1.2 mg/dL    Globulin 2.5 gm/dL    A/G Ratio 1.7 g/dL    BUN/Creatinine Ratio 12.5 7.0 - 25.0    Anion Gap 4.8 (L) 5.0 - 15.0 mmol/L    eGFR 87.5 >60.0 mL/min/1.73   CBC Auto Differential    Collection Time: 08/02/24 10:33 AM    Specimen: Blood   Result Value Ref Range    WBC 5.05 3.40 - 10.80 10*3/mm3    RBC 4.88 3.77 - 5.28 10*6/mm3    Hemoglobin 14.7 12.0 - 15.9 g/dL    Hematocrit 44.8 34.0 - 46.6 %    MCV 91.8 79.0 - 97.0 fL    MCH 30.1 26.6 - 33.0 pg    MCHC 32.8 31.5 - 35.7 g/dL    RDW 12.5 12.3 - 15.4 %    RDW-SD 42.5 37.0 - 54.0 fl    MPV 10.1 6.0 - 12.0 fL    Platelets 241 140 - 450 10*3/mm3    Neutrophil % 62.4 42.7 - 76.0 %    Lymphocyte % 28.7 19.6 - 45.3 %    Monocyte % 6.3 5.0 - 12.0 %    Eosinophil % 1.8 0.3 - 6.2 %    Basophil % 0.8 0.0 - 1.5 %    Immature Grans % 0.0 0.0 - 0.5 %    Neutrophils, Absolute 3.15 1.70 - 7.00 10*3/mm3    Lymphocytes, Absolute 1.45 0.70 - 3.10 10*3/mm3    Monocytes, Absolute 0.32 0.10 - 0.90 10*3/mm3    Eosinophils, Absolute 0.09 0.00 - 0.40 10*3/mm3    Basophils, Absolute 0.04 0.00 - 0.20 10*3/mm3    Immature Grans, Absolute 0.00 0.00 - 0.05 10*3/mm3   hCG, Serum, Qualitative    Collection Time: 08/02/24 10:33 AM    Specimen: Blood   Result Value Ref Range    HCG Qualitative Negative Negative       Ordered the above labs and independently interpreted results.  My findings will be  discussed in the ED course or medical decision making section below      PROGRESS, DATA ANALYSIS, CONSULTS AND MEDICAL DECISION MAKING    Please note that this section constitutes my independent interpretation of clinical data including lab results, radiology, EKG's.  This constitutes my independent professional opinion regarding differential diagnosis and management of this patient.  It may include any factors such as history from outside sources, review of external records, social determinants of health, management of medications, response to those treatments, and discussions with other providers.    ED Course as of 08/02/24 1143   Fri Aug 02, 2024   1058 HCG Qualitative: Negative [EW]   1058 WBC: 5.05 [EW]   1107 MDM/dispo:  I updated the patient on labs.  I recommended admission to OBS unit so she could receive the appropriate regimen (which is longer) for premedication related to her contrast allergy (contrast allergy causes airway issues which is concerning).  She declines admission.  This is reasonable, she has no history of immunocompromise.  Nontoxic appearance.  Patient euvolemic with no trismus.  No airway compromise.  No change in voice, exudates, enlarged lymph nodes.  Patient is able to tolerate p.o. but does have severe gastroparesis which I feel is complicating things with GERD.  She has significant throat clearing and is not on Protonix.  Her symptoms are worse since she started opening up her Adderal capsules and swallowing them with applesauce.  Given history and exam I have low suspicion for this presentation being caused by peritonsillar abscess, retropharyngeal abscess, Ludwigs angina, epiglottitis or bacterial tracheitis, or EBV.  We had shared decision making and she will call Dr. Brizuela with ENT for follow up next week but in meanwhile she will stop opening up the Adderal capsules, resume Protonix and I will RX Carafate.    [EW]      ED Course User Index  [EW] Yu Lynn APRN      Orders placed during this visit:  Orders Placed This Encounter   Procedures    Rapid Strep A Screen - Swab, Throat    Comprehensive Metabolic Panel    CBC Auto Differential    hCG, Serum, Qualitative    Blood Draw With IV Start    Insert peripheral IV    CBC & Differential    ED Acknowledgement Form Needed;            Medical Decision Making  Problems Addressed:  Gastroesophageal reflux disease, unspecified whether esophagitis present: complicated acute illness or injury  Sore throat: complicated acute illness or injury    Amount and/or Complexity of Data Reviewed  Labs: ordered. Decision-making details documented in ED Course.  Radiology: ordered.    Risk  Prescription drug management.    See ED course.  Again, no drooling, trismus, vocal changes.         DIAGNOSIS  Final diagnoses:   Sore throat   Gastroesophageal reflux disease, unspecified whether esophagitis present          Medication List        New Prescriptions      pantoprazole 40 MG EC tablet  Commonly known as: PROTONIX  Take 1 tablet by mouth Daily.     sucralfate 1 GM/10ML suspension  Commonly known as: CARAFATE  Take 10 mL by mouth 3 times a day for 30 days.               Where to Get Your Medications        These medications were sent to [address] [facility] - [phone]  - [phone]   [address] [place] [address]      Phone: [phone]   pantoprazole 40 MG EC tablet  sucralfate 1 GM/10ML suspension         FOLLOW-UP  Long PineKhanh MD  [address] [id number] [phone]    Schedule an appointment as soon as possible for a visit   to be seen early next week; call for appointment        Latest Documented Vital Signs:  As of 11:43 EDT  BP- 127/89 HR- 71 Temp- 97.9 °F (36.6 °C) O2 sat- 95%    Appropriate PPE utilized throughout this patient encounter to include mask, if indicated, per current protocol. Hand hygiene was performed  before donning PPE and after removal when leaving the room.    Please note that portions of this were completed with a voice recognition program.     Note Disclaimer: At [facility], we believe that sharing information builds trust and better relationships. You are receiving this note because you are receiving care at [facility] or recently visited. It is possible you will see health information before a provider has talked with you about it. This kind of information can be easy to misunderstand. To help you fully understand what it means for your health, we urge you to discuss this note with your provider.

## 2024-09-15 DIAGNOSIS — G43.711 INTRACTABLE CHRONIC MIGRAINE WITHOUT AURA AND WITH STATUS MIGRAINOSUS: ICD-10-CM

## 2024-09-15 DIAGNOSIS — K59.09 CHRONIC CONSTIPATION: ICD-10-CM

## 2024-09-15 DIAGNOSIS — Z14.8 HEMOCHROMATOSIS CARRIER: ICD-10-CM

## 2024-09-15 DIAGNOSIS — K21.9 GASTROESOPHAGEAL REFLUX DISEASE WITHOUT ESOPHAGITIS: ICD-10-CM

## 2024-09-15 DIAGNOSIS — E55.9 VITAMIN D DEFICIENCY: Primary | ICD-10-CM

## 2024-09-15 DIAGNOSIS — E53.8 LOW SERUM VITAMIN B12: ICD-10-CM

## 2024-10-17 LAB
25(OH)D3+25(OH)D2 SERPL-MCNC: 32.3 NG/ML (ref 30–100)
ALBUMIN SERPL-MCNC: 4.2 G/DL (ref 3.5–5.2)
ALBUMIN/GLOB SERPL: 1.6 G/DL
ALP SERPL-CCNC: 91 U/L (ref 39–117)
ALT SERPL-CCNC: 27 U/L (ref 1–33)
AST SERPL-CCNC: 19 U/L (ref 1–32)
BILIRUB SERPL-MCNC: 0.5 MG/DL (ref 0–1.2)
BUN SERPL-MCNC: 15 MG/DL (ref 6–20)
BUN/CREAT SERPL: 19.2 (ref 7–25)
CALCIUM SERPL-MCNC: 9.1 MG/DL (ref 8.6–10.5)
CHLORIDE SERPL-SCNC: 103 MMOL/L (ref 98–107)
CHOLEST SERPL-MCNC: 159 MG/DL (ref 0–200)
CO2 SERPL-SCNC: 26.4 MMOL/L (ref 22–29)
CREAT SERPL-MCNC: 0.78 MG/DL (ref 0.57–1)
EGFRCR SERPLBLD CKD-EPI 2021: 100.5 ML/MIN/1.73
FOLATE SERPL-MCNC: 7.32 NG/ML (ref 4.78–24.2)
GLOBULIN SER CALC-MCNC: 2.6 GM/DL
GLUCOSE SERPL-MCNC: 96 MG/DL (ref 65–99)
HBA1C MFR BLD: 5.2 % (ref 4.8–5.6)
HDLC SERPL-MCNC: 42 MG/DL (ref 40–60)
LDLC SERPL CALC-MCNC: 105 MG/DL (ref 0–100)
MAGNESIUM SERPL-MCNC: 2.3 MG/DL (ref 1.6–2.6)
POTASSIUM SERPL-SCNC: 4.3 MMOL/L (ref 3.5–5.2)
PROT SERPL-MCNC: 6.8 G/DL (ref 6–8.5)
SODIUM SERPL-SCNC: 140 MMOL/L (ref 136–145)
T3FREE SERPL-MCNC: 2.9 PG/ML (ref 2–4.4)
T4 FREE SERPL-MCNC: 1.17 NG/DL (ref 0.92–1.68)
THYROGLOB AB SERPL-ACNC: <1 IU/ML (ref 0–0.9)
THYROPEROXIDASE AB SERPL-ACNC: 10 IU/ML (ref 0–34)
TRIGL SERPL-MCNC: 57 MG/DL (ref 0–150)
TSH SERPL DL<=0.005 MIU/L-ACNC: 1.57 UIU/ML (ref 0.27–4.2)
VIT B12 SERPL-MCNC: 443 PG/ML (ref 211–946)
VLDLC SERPL CALC-MCNC: 12 MG/DL (ref 5–40)

## 2024-10-23 RX ORDER — NITROFURANTOIN 25; 75 MG/1; MG/1
100 CAPSULE ORAL EVERY 12 HOURS SCHEDULED
Qty: 14 CAPSULE | Refills: 0 | Status: SHIPPED | OUTPATIENT
Start: 2024-10-23

## 2025-01-27 NOTE — PROGRESS NOTES
Follow-up (Pt had  flu last week and now has Covid./)    02/10/2025, interval history:  This was completed as televisit.  Patient unfortunately had flu last week, and now has COVID.  She denies any other new concerns or complaints.    HISTORY OF PRESENT ILLNESS:  The patient is a 39 y.o. year old female  Who was found to be hemochromatosis carrier.  Referred to our clinic for further evaluation management.  She reports her sister who is a NICU nurse at [employer] also is hemochromatosis carrier.  Patient reports she was tested due to her elevated liver enzymes.  No other family history of hemochromatosis.  She reports her paternal uncle  of liver cancer recently.          MEDICATIONS    Current Outpatient Medications:     amoxicillin-clavulanate (AUGMENTIN) 600-42.9 MG/5ML suspension, Take 6.7 mL by mouth Daily for 7 days., Disp: 46.9 mL, Rfl: 0    amphetamine-dextroamphetamine XR (ADDERALL XR) 20 MG 24 hr capsule, Take 1 capsule by mouth Daily, Disp: , Rfl:     cyanocobalamin 1000 MCG/ML injection, INJECT 1 ML (CC) INTO THE APPROPRIATE MUSCLE AS DIRECTED BY PRESCRIBER ONCE EVERY 14 DAYS., Disp: , Rfl:     FLUoxetine (PROzac) 40 MG capsule, Take 1 capsule by mouth Daily. For stress, Disp: 90 capsule, Rfl: 3    ibuprofen (ADVIL,MOTRIN) 200 MG tablet, Take 1 tablet by mouth Every 6 (Six) Hours As Needed for Mild Pain., Disp: , Rfl:     LORazepam (Ativan) 0.5 MG tablet, Take 1 tablet by mouth Every 8 (Eight) Hours As Needed for Anxiety., Disp: 45 tablet, Rfl: 0    nitrofurantoin, macrocrystal-monohydrate, (MACROBID) 100 MG capsule, Take 1 capsule by mouth Every 12 (Twelve) Hours. For UTI, Disp: 14 capsule, Rfl: 0    ondansetron ODT (ZOFRAN-ODT) 4 MG disintegrating tablet, Place 1 tablet on the tongue Every 8 (Eight) Hours As Needed for Nausea or Vomiting., Disp: 30 tablet, Rfl: 11    pantoprazole (PROTONIX) 40 MG EC tablet, Take 1 tablet by mouth Daily., Disp: 30 tablet, Rfl: 0    rizatriptan (MAXALT) 10 MG  "tablet, Take 1 tablet by mouth 1 (One) Time As Needed for Migraine for up to 1 dose. May repeat in 2 hours if needed, Disp: 12 tablet, Rfl: 11    Syringe/Needle, Disp, (B-D 3CC LUER-YVONNE SYR 23GX1\") 23G X 1\" 3 ML misc, 1 mL of B12 IM every 14 days, Disp: 30 each, Rfl: 1    topiramate (TOPAMAX) 100 MG tablet, Take 1 tablet by mouth Daily. For migraine suppression, Disp: 90 tablet, Rfl: 3    traZODone (DESYREL) 50 MG tablet, Take 1-2 tablets by mouth At Night As Needed. for sleep, Disp: , Rfl:     adZENys XR-ODT 15.7 MG Tablet Extended Release Dispersible, DISSOLVE 1 TABLET BY MOUTH DAILY., Disp: , Rfl:     levocetirizine (XYZAL) 5 MG tablet, Take 1 tablet by mouth Every Evening for 30 days., Disp: 30 tablet, Rfl: 0    linaclotide (Linzess) 145 MCG capsule capsule, Take 1 capsule by mouth Every Morning Before Breakfast., Disp: 90 capsule, Rfl: 3    ALLERGIES:     Allergies   Allergen Reactions    Chocolate Anaphylaxis and GI Intolerance    Contrast Dye (Echo Or Unknown Ct/Mr) Angioedema     TONGUE NUMBNESS    Sulfa Antibiotics Nausea And Vomiting    Beef-Derived Drug Products GI Intolerance    Gelatin GI Intolerance    Rice Allergy Skin Test GI Intolerance         I have reviewed Past Medical, Surgical History, Social History, Meds and Allergies.     REVIEW OF SYSTEMS:  See interval History             Vitals:    02/10/25 1350   PainSc: 0-No pain           2/10/2025     1:52 PM   Current Status   ECOG score 0     Physical exam  Not done as this was completed as televisit     RECENT LABS:        WBC   Date Value Ref Range Status   08/02/2024 5.05 3.40 - 10.80 10*3/mm3 Final   07/01/2024 6.67 3.40 - 10.80 10*3/mm3 Final   11/29/2023 6.45 3.40 - 10.80 10*3/mm3 Final   11/10/2023 8.68 3.40 - 10.80 10*3/mm3 Final   03/09/2023 8.39 3.40 - 10.80 10*3/mm3 Final   08/26/2022 7.5 3.4 - 10.8 x10E3/uL Final   12/07/2021 6.7 3.4 - 10.8 x10E3/uL Final   07/16/2020 7.52 3.40 - 10.80 10*3/mm3 Final   01/13/2020 9.37 3.40 - 10.80 " 10*3/mm3 Final   10/23/2019 6.34 3.40 - 10.80 10*3/mm3 Final   01/25/2019 6.42 4.50 - 10.70 10*3/mm3 Final   11/27/2017 7.40 4.50 - 10.70 10*3/mm3 Final   02/20/2017 6.53 4.50 - 10.70 10*3/mm3 Final   06/03/2016 6.73 4.50 - 10.70 10*3/mm3 Final   03/02/2015 7.69 4.50 - 10.70 K/Cumm Final     Hemoglobin   Date Value Ref Range Status   08/02/2024 14.7 12.0 - 15.9 g/dL Final   07/01/2024 15.3 12.0 - 15.9 g/dL Final   11/29/2023 14.8 12.0 - 15.9 g/dL Final   11/10/2023 14.0 12.0 - 15.9 g/dL Final   03/09/2023 16.5 (H) 12.0 - 15.9 g/dL Final   08/26/2022 13.5 11.1 - 15.9 g/dL Final   12/07/2021 15.0 11.1 - 15.9 g/dL Final   07/16/2020 15.2 12.0 - 15.9 g/dL Final   01/13/2020 14.6 12.0 - 15.9 g/dL Final   10/23/2019 13.9 12.0 - 15.9 g/dL Final   01/25/2019 14.4 11.9 - 15.5 g/dL Final   11/27/2017 13.8 11.9 - 15.5 g/dL Final   02/20/2017 14.4 11.9 - 15.5 g/dL Final   06/03/2016 13.2 11.9 - 15.5 g/dL Final   03/02/2015 14.7 11.9 - 15.5 g/dL Final     Platelets   Date Value Ref Range Status   08/02/2024 241 140 - 450 10*3/mm3 Final   07/01/2024 230 140 - 450 10*3/mm3 Final   11/29/2023 292 140 - 450 10*3/mm3 Final   11/10/2023 264 140 - 450 10*3/mm3 Final   03/09/2023 340 140 - 450 10*3/mm3 Final   08/26/2022 229 150 - 450 x10E3/uL Final   12/07/2021 261 150 - 450 x10E3/uL Final   07/16/2020 235 140 - 450 10*3/mm3 Final   01/13/2020 219 140 - 450 10*3/mm3 Final   10/23/2019 213 140 - 450 10*3/mm3 Final   01/25/2019 238 140 - 500 10*3/mm3 Final   11/27/2017 273 140 - 500 10*3/mm3 Final   02/20/2017 239 140 - 500 10*3/mm3 Final   06/03/2016 233 140 - 500 10*3/mm3 Final   03/02/2015 246 140 - 500 K/Cumm Final     Hemochromatosis Gene Comment   Comment: Result: Two mutations (H63D and S65C) identified  Interpretation:   This patient's sample was analyzed for the hereditary hemochromatosis   (HH) mutations C282Y, H63D, and S65C.  One copy of H63D and one copy   of S65C were identified.  Results for C282Y were negative.  The    mutations analyzed by [employer] are most common in the    population.  The diagnosis of HH should not rely on DNA testing alone   because not all people with H63D/S65C develop clinical symptoms of HH.   Diagnosis of HH should include clinical findings and other test   results, such as transferrin-iron saturation and/or serum ferritin   and/or liver biopsy.  HH is inherited in a recessive manner.  All   offspring from this patient will be carriers, and other family members   are at increased risk.  Genetic counseling and HH molecular testing   are recommended for at-risk family members.     Assessment:  *Hereditary hemochromatosis, H63D/S65C  7/1/2024: Reviewed diagnosis of HH with pt, including HFE gene physiology, genetics, and significance of her mutation (increased intestinal iron absorption). Discussed potential sequela of increased iron, such as deposition on organs resulting in organ damage. Reviewed dietary implications-does not necessarily need to change diet, or avoid iron specifically. However, recommended against any additional iron supplements/vitamins. Also counseled pt on importance of avoiding alcohol and additive effects of EtOH on ferritin/iron stores  2/10/2025: We do not have labs for our review.  [facility]  We will await the labs    Plan  -Return to clinic for televisit in 1 year with CBC, CMP, ferritin.  Patient prefers labs to be drawn at [facility]

## 2025-02-07 ENCOUNTER — TELEPHONE (OUTPATIENT)
Dept: ONCOLOGY | Facility: CLINIC | Age: 40
End: 2025-02-07
Payer: COMMERCIAL

## 2025-02-07 NOTE — TELEPHONE ENCOUNTER
Caller: Adry Knight    Relationship: Self    Best call back number: [phone]    What is the best time to reach you: ANYTIME    Who are you requesting to speak with (clinical staff, provider,  specific staff member): CLINICAL    What was the call regarding: PATIENT WANTS TO KNOW IF DR CARDENAS WOULD BE ABLE TO FAX HER LAB ORDERS TO [facility] ([phone]) AND THEN HER DO THE 2/10 F/U APPT AS A VIRTUAL VISIT. PATIENT CAN GET LABS TODAY IF NEEDED.    PLEASE ADVISE ASAP.

## 2025-02-07 NOTE — TELEPHONE ENCOUNTER
I called the patient, let know per Dr. Gomez that the appt can be change to telehealth per Dr. Gomez. Message sent to scheduling to change and lab orders have been faxed to [facility] and the number provided.

## 2025-02-10 ENCOUNTER — TELEPHONE (OUTPATIENT)
Dept: ONCOLOGY | Facility: CLINIC | Age: 40
End: 2025-02-10
Payer: COMMERCIAL

## 2025-02-10 ENCOUNTER — TELEMEDICINE (OUTPATIENT)
Dept: ONCOLOGY | Facility: CLINIC | Age: 40
End: 2025-02-10
Payer: COMMERCIAL

## 2025-02-10 DIAGNOSIS — Z14.8 HEMOCHROMATOSIS CARRIER: Primary | ICD-10-CM

## 2025-02-10 NOTE — TELEPHONE ENCOUNTER
----- Message from Ada Gomez sent at 2/10/2025  2:57 PM EST -----  Reviewed her labs.  Ferritin 61.  Will continue to monitor.  Mild leukocytosis likely from her recent COVID and flu infections.  No change in management

## 2025-02-15 ENCOUNTER — PATIENT MESSAGE (OUTPATIENT)
Dept: FAMILY MEDICINE CLINIC | Facility: CLINIC | Age: 40
End: 2025-02-15
Payer: COMMERCIAL

## 2025-02-15 RX ORDER — OSELTAMIVIR PHOSPHATE 75 MG/1
75 CAPSULE ORAL 2 TIMES DAILY
Qty: 10 CAPSULE | Refills: 0 | Status: SHIPPED | OUTPATIENT
Start: 2025-02-15

## 2025-02-19 ENCOUNTER — HOSPITAL ENCOUNTER (EMERGENCY)
Facility: HOSPITAL | Age: 40
Discharge: HOME | End: 2025-02-19
Attending: EMERGENCY MEDICINE | Admitting: EMERGENCY MEDICINE
Payer: COMMERCIAL

## 2025-02-19 ENCOUNTER — APPOINTMENT (OUTPATIENT)
Dept: GENERAL RADIOLOGY | Facility: HOSPITAL | Age: 40
End: 2025-02-19
Payer: COMMERCIAL

## 2025-02-19 VITALS
HEIGHT: 63 IN | WEIGHT: 200 LBS | OXYGEN SATURATION: 100 % | BODY MASS INDEX: 35.44 KG/M2 | HEART RATE: 83 BPM | SYSTOLIC BLOOD PRESSURE: 125 MMHG | TEMPERATURE: 98.3 F | RESPIRATION RATE: 18 BRPM | DIASTOLIC BLOOD PRESSURE: 86 MMHG

## 2025-02-19 DIAGNOSIS — R07.89 CHEST WALL PAIN: Primary | ICD-10-CM

## 2025-02-19 LAB
ALBUMIN SERPL-MCNC: 3.5 G/DL (ref 3.5–5.2)
ALBUMIN/GLOB SERPL: 1.1 G/DL
ALP SERPL-CCNC: 83 U/L (ref 39–117)
ALT SERPL W P-5'-P-CCNC: 38 U/L (ref 1–33)
ANION GAP SERPL CALCULATED.3IONS-SCNC: 7.3 MMOL/L (ref 5–15)
AST SERPL-CCNC: 18 U/L (ref 1–32)
BASOPHILS # BLD AUTO: 0.05 10*3/MM3 (ref 0–0.2)
BASOPHILS NFR BLD AUTO: 0.6 % (ref 0–1.5)
BILIRUB SERPL-MCNC: 0.2 MG/DL (ref 0–1.2)
BUN SERPL-MCNC: 11 MG/DL (ref 6–20)
BUN/CREAT SERPL: 15.3 (ref 7–25)
CALCIUM SPEC-SCNC: 9.2 MG/DL (ref 8.6–10.5)
CHLORIDE SERPL-SCNC: 106 MMOL/L (ref 98–107)
CO2 SERPL-SCNC: 26.7 MMOL/L (ref 22–29)
CREAT SERPL-MCNC: 0.72 MG/DL (ref 0.57–1)
D DIMER PPP FEU-MCNC: <0.27 MCGFEU/ML (ref 0–0.5)
DEPRECATED RDW RBC AUTO: 42.8 FL (ref 37–54)
EGFRCR SERPLBLD CKD-EPI 2021: 110.6 ML/MIN/1.73
EOSINOPHIL # BLD AUTO: 0.08 10*3/MM3 (ref 0–0.4)
EOSINOPHIL NFR BLD AUTO: 1 % (ref 0.3–6.2)
ERYTHROCYTE [DISTWIDTH] IN BLOOD BY AUTOMATED COUNT: 13 % (ref 12.3–15.4)
GEN 5 1HR TROPONIN T REFLEX: <6 NG/L
GLOBULIN UR ELPH-MCNC: 3.2 GM/DL
GLUCOSE SERPL-MCNC: 87 MG/DL (ref 65–99)
HCT VFR BLD AUTO: 42.5 % (ref 34–46.6)
HGB BLD-MCNC: 14.1 G/DL (ref 12–15.9)
HOLD SPECIMEN: NORMAL
HOLD SPECIMEN: NORMAL
IMM GRANULOCYTES # BLD AUTO: 0.03 10*3/MM3 (ref 0–0.05)
IMM GRANULOCYTES NFR BLD AUTO: 0.4 % (ref 0–0.5)
LYMPHOCYTES # BLD AUTO: 1.65 10*3/MM3 (ref 0.7–3.1)
LYMPHOCYTES NFR BLD AUTO: 21.1 % (ref 19.6–45.3)
MCH RBC QN AUTO: 29.7 PG (ref 26.6–33)
MCHC RBC AUTO-ENTMCNC: 33.2 G/DL (ref 31.5–35.7)
MCV RBC AUTO: 89.5 FL (ref 79–97)
MONOCYTES # BLD AUTO: 0.72 10*3/MM3 (ref 0.1–0.9)
MONOCYTES NFR BLD AUTO: 9.2 % (ref 5–12)
NEUTROPHILS NFR BLD AUTO: 5.3 10*3/MM3 (ref 1.7–7)
NEUTROPHILS NFR BLD AUTO: 67.7 % (ref 42.7–76)
NRBC BLD AUTO-RTO: 0 /100 WBC (ref 0–0.2)
PLATELET # BLD AUTO: 312 10*3/MM3 (ref 140–450)
PMV BLD AUTO: 10 FL (ref 6–12)
POTASSIUM SERPL-SCNC: 3.8 MMOL/L (ref 3.5–5.2)
PROT SERPL-MCNC: 6.7 G/DL (ref 6–8.5)
QT INTERVAL: 401 MS
QTC INTERVAL: 439 MS
RBC # BLD AUTO: 4.75 10*6/MM3 (ref 3.77–5.28)
SODIUM SERPL-SCNC: 140 MMOL/L (ref 136–145)
TROPONIN T NUMERIC DELTA: NORMAL
TROPONIN T SERPL HS-MCNC: <6 NG/L
WBC NRBC COR # BLD AUTO: 7.83 10*3/MM3 (ref 3.4–10.8)
WHOLE BLOOD HOLD COAG: NORMAL
WHOLE BLOOD HOLD SPECIMEN: NORMAL

## 2025-02-19 PROCEDURE — 80053 COMPREHEN METABOLIC PANEL: CPT

## 2025-02-19 PROCEDURE — 71045 X-RAY EXAM CHEST 1 VIEW: CPT

## 2025-02-19 PROCEDURE — 93005 ELECTROCARDIOGRAM TRACING: CPT

## 2025-02-19 PROCEDURE — 84484 ASSAY OF TROPONIN QUANT: CPT | Performed by: EMERGENCY MEDICINE

## 2025-02-19 PROCEDURE — 84484 ASSAY OF TROPONIN QUANT: CPT

## 2025-02-19 PROCEDURE — 93005 ELECTROCARDIOGRAM TRACING: CPT | Performed by: EMERGENCY MEDICINE

## 2025-02-19 PROCEDURE — 85379 FIBRIN DEGRADATION QUANT: CPT | Performed by: PHYSICIAN ASSISTANT

## 2025-02-19 PROCEDURE — 99284 EMERGENCY DEPT VISIT MOD MDM: CPT

## 2025-02-19 PROCEDURE — 85025 COMPLETE CBC W/AUTO DIFF WBC: CPT

## 2025-02-19 PROCEDURE — 93010 ELECTROCARDIOGRAM REPORT: CPT | Performed by: STUDENT IN AN ORGANIZED HEALTH CARE EDUCATION/TRAINING PROGRAM

## 2025-02-19 PROCEDURE — 36415 COLL VENOUS BLD VENIPUNCTURE: CPT

## 2025-02-19 RX ORDER — SODIUM CHLORIDE 0.9 % (FLUSH) 0.9 %
10 SYRINGE (ML) INJECTION AS NEEDED
Status: DISCONTINUED | OUTPATIENT
Start: 2025-02-19 | End: 2025-02-19 | Stop reason: HOSPADM

## 2025-02-19 RX ORDER — ASPIRIN 325 MG
325 TABLET ORAL ONCE
Status: DISCONTINUED | OUTPATIENT
Start: 2025-02-19 | End: 2025-02-19 | Stop reason: HOSPADM

## 2025-02-19 NOTE — ED PROVIDER NOTES
EMERGENCY DEPARTMENT ENCOUNTER  Room Number:  03/03  PCP: Cora Palmer PA-C  Independent Historians: Patient      HPI:  Chief Complaint: had concerns including Chest Pain and Shortness of Breath.     A complete HPI/ROS/PMH/PSH/SH/FH are unobtainable due to: None    Chronic or social conditions impacting patient care (Social Determinants of Health): None      Context: The patient is a 39 y.o. female with a medical history of migraines, anxiety, depression, GERD, gastroparesis who presents to the ED c/o acute chest pain and shortness of breath.  She reports a dull pain in an area of her left mid chest that has been intermittent for the last 2 days.  States it seems to be worse when she bends over and then stands up or walks around, nothing makes it better.  It is currently present.  She also reports intermittently for going short of breath.  Chest pain is not pleuritic and not specifically exertional.  She states she had an abnormal EKG at urgent care yesterday and that her lung sounds were diminished though her chest x-ray was reportedly okay.  She had COVID-19 about 10 days ago but is no longer feeling any upper respiratory symptoms.  No history of hypertension hyperlipidemia diabetes or smoking, no known family history. The patient denies any trips or travel, immobilization, or surgeries/trauma in the last 4 weeks, as well as any  hemoptysis, hx of VTE, calf pain, or unilateral leg swelling.  She did take exogenous estrogen and progesterone for about 3 days 2 weeks ago and then ended up discontinuing it.        Review of prior external notes (non-ED) -and- Review of prior external test results outside of this encounter:  Labs performed 10/16/2024 and creatinine was 0.78, sodium 140, potassium 4.3        PAST MEDICAL HISTORY  Active Ambulatory Problems     Diagnosis Date Noted    Intractable migraine with aura without status migrainosus 11/11/2015    Headache, migraine 11/11/2015    Burning or prickling sensation  11/11/2015    Anxiety 11/11/2015    Depression 11/11/2015    Low back pain without sciatica 01/04/2016    Neck pain 01/04/2016    Pain of left scapula 02/01/2016    Chronic constipation 05/31/2019    Gastroesophageal reflux disease without esophagitis 05/31/2019    Intractable chronic migraine without aura 01/06/2020    Abdominal bloating 01/09/2020    Generalized abdominal pain 01/09/2020    AAT (alpha-1-antitrypsin) deficiency 07/14/2020    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    Close exposure to COVID-19 virus 09/14/2020    COVID-19 11/14/2021    BMI 31.0-31.9,adult 11/14/2021     Resolved Ambulatory Problems     Diagnosis Date Noted    Biliary dyskinesia 10/23/2018     Past Medical History:   Diagnosis Date    Allergic     Dysphagia     Gastroparesis     GERD (gastroesophageal reflux disease)     Headache     Hernia     Irritable bowel syndrome     Lactose intolerance     LFT elevation     Low back pain     Migraines     Scoliosis     Shingles     Urinary tract infection     Wears glasses          PAST SURGICAL HISTORY  Past Surgical History:   Procedure Laterality Date    CHOLECYSTECTOMY  10/23/2018    CHOLECYSTECTOMY WITH INTRAOPERATIVE CHOLANGIOGRAM N/A 10/24/2018    Procedure: LAPAROSCOPIC CHOLECYSTECTOMY WITH INTRAOPERATIVE CHOLANGIOGRAM, POSSIBLE OPEN;  Surgeon: Zuleima Johansen MD;  Location: [facility] OR [facility];  Service: General    COLONOSCOPY N/A 02/04/2020    Procedure: COLONOSCOPY to cecum;  Surgeon: Marleni Alas MD;  Location: [facility] ENDOSCOPY;  Service: Gastroenterology;  Laterality: N/A;  pre - abd pain  post - hemorrhoids    COLONOSCOPY  2/4/2020    ENDOSCOPY N/A 02/04/2020    Procedure: ESOPHAGOGASTRODUODENOSCOPY with cold bx;  Surgeon: Marleni Alas MD;  Location: [facility] ENDOSCOPY;  Service: Gastroenterology;  Laterality: N/A;  pre - gerd, abd pain  post - gastritis    TONSILLECTOMY      UPPER GASTROINTESTINAL ENDOSCOPY N/A 09/21/2015    Normal esophagus, Normal stomach, Normal duodenum,  Dr. Marleni Alas    VAGINAL DELIVERY N/A 10/08/2012    Dr. Brittney Wesley         FAMILY HISTORY  Family History   Problem Relation Age of Onset    Depression Mother     Irritable bowel syndrome Mother     Alcohol abuse Mother     Asthma Mother     Anxiety disorder Mother     Arthritis Mother     Cancer Sister     Celiac disease Sister     Cancer Paternal Grandmother     Celiac disease Sister     Gallbladder disease Sister     Irritable bowel syndrome Sister     Irritable bowel syndrome Maternal Grandmother     Alcohol abuse Maternal Grandmother     Depression Maternal Grandmother     Mental illness Maternal Grandmother     Alcohol abuse Brother     Asthma Brother     Malig Hyperthermia Neg Hx          SOCIAL HISTORY  Social History     Socioeconomic History    Marital status:    Tobacco Use    Smoking status: Never    Smokeless tobacco: Never   Vaping Use    Vaping status: Never Used   Substance and Sexual Activity    Alcohol use: No    Drug use: No    Sexual activity: Yes     Partners: Male     Birth control/protection: Condom         ALLERGIES  Chocolate, Contrast dye (echo or unknown ct/mr), Sulfa antibiotics, Beef-derived drug products, Gelatin, and Rice allergy skin test      REVIEW OF SYSTEMS  Review of Systems  Included in HPI  All systems reviewed and negative except for those discussed in HPI.      PHYSICAL EXAM    I have reviewed the triage vital signs and nursing notes.    ED Triage Vitals   Temp Heart Rate Resp BP SpO2   02/19/25 1217 02/19/25 1217 02/19/25 1217 02/19/25 1222 02/19/25 1217   98.3 °F (36.8 °C) 83 18 117/80 100 %      Temp src Heart Rate Source Patient Position BP Location FiO2 (%)   -- -- -- -- --              Physical Exam  GENERAL: alert, no acute distress  SKIN: Warm, dry  HENT: Normocephalic, atraumatic  EYES: no scleral icterus  CV: regular rhythm, regular rate, lower extremities are without swelling, no calf tenderness, negative Homans' sign bilaterally  RESPIRATORY: normal  effort, lungs clear, chest pain reproduced with palpation of left chest wall, no step-offs or crepitus  ABDOMEN: nondistended soft nontender  MUSCULOSKELETAL: no deformity  NEURO: alert, moves all extremities, follows commands            LAB RESULTS  Recent Results (from the past 24 hours)   ECG 12 Lead Chest Pain    Collection Time: 02/18/25  3:32 PM   Result Value Ref Range    QT Interval 387 ms    QTC Interval 461 ms   COVID-19 and FLU A/B PCR, 1 HR TAT - Swab, Nasopharynx    Collection Time: 02/18/25  4:47 PM    Specimen: Nasopharynx; Swab   Result Value Ref Range    COVID19 Not Detected Not Detected - Ref. Range    Influenza A PCR Not Detected Not Detected    Influenza B PCR Not Detected Not Detected   ECG 12 Lead ED Triage Standing Order; Chest Pain    Collection Time: 02/19/25 12:19 PM   Result Value Ref Range    QT Interval 401 ms    QTC Interval 439 ms   Comprehensive Metabolic Panel    Collection Time: 02/19/25 12:22 PM    Specimen: Arm, Right; Blood   Result Value Ref Range    Glucose 87 65 - 99 mg/dL    BUN 11 6 - 20 mg/dL    Creatinine 0.72 0.57 - 1.00 mg/dL    Sodium 140 136 - 145 mmol/L    Potassium 3.8 3.5 - 5.2 mmol/L    Chloride 106 98 - 107 mmol/L    CO2 26.7 22.0 - 29.0 mmol/L    Calcium 9.2 8.6 - 10.5 mg/dL    Total Protein 6.7 6.0 - 8.5 g/dL    Albumin 3.5 3.5 - 5.2 g/dL    ALT (SGPT) 38 (H) 1 - 33 U/L    AST (SGOT) 18 1 - 32 U/L    Alkaline Phosphatase 83 39 - 117 U/L    Total Bilirubin 0.2 0.0 - 1.2 mg/dL    Globulin 3.2 gm/dL    A/G Ratio 1.1 g/dL    BUN/Creatinine Ratio 15.3 7.0 - 25.0    Anion Gap 7.3 5.0 - 15.0 mmol/L    eGFR 110.6 >60.0 mL/min/1.73   High Sensitivity Troponin T    Collection Time: 02/19/25 12:22 PM    Specimen: Arm, Right; Blood   Result Value Ref Range    HS Troponin T <6 <14 ng/L   Green Top (Gel)    Collection Time: 02/19/25 12:22 PM   Result Value Ref Range    Extra Tube Hold for add-ons.    Lavender Top    Collection Time: 02/19/25 12:22 PM   Result Value Ref Range     Extra Tube hold for add-on    Gold Top - SST    Collection Time: 02/19/25 12:22 PM   Result Value Ref Range    Extra Tube Hold for add-ons.    Light Blue Top    Collection Time: 02/19/25 12:22 PM   Result Value Ref Range    Extra Tube Hold for add-ons.    CBC Auto Differential    Collection Time: 02/19/25 12:22 PM    Specimen: Arm, Right; Blood   Result Value Ref Range    WBC 7.83 3.40 - 10.80 10*3/mm3    RBC 4.75 3.77 - 5.28 10*6/mm3    Hemoglobin 14.1 12.0 - 15.9 g/dL    Hematocrit 42.5 34.0 - 46.6 %    MCV 89.5 79.0 - 97.0 fL    MCH 29.7 26.6 - 33.0 pg    MCHC 33.2 31.5 - 35.7 g/dL    RDW 13.0 12.3 - 15.4 %    RDW-SD 42.8 37.0 - 54.0 fl    MPV 10.0 6.0 - 12.0 fL    Platelets 312 140 - 450 10*3/mm3    Neutrophil % 67.7 42.7 - 76.0 %    Lymphocyte % 21.1 19.6 - 45.3 %    Monocyte % 9.2 5.0 - 12.0 %    Eosinophil % 1.0 0.3 - 6.2 %    Basophil % 0.6 0.0 - 1.5 %    Immature Grans % 0.4 0.0 - 0.5 %    Neutrophils, Absolute 5.30 1.70 - 7.00 10*3/mm3    Lymphocytes, Absolute 1.65 0.70 - 3.10 10*3/mm3    Monocytes, Absolute 0.72 0.10 - 0.90 10*3/mm3    Eosinophils, Absolute 0.08 0.00 - 0.40 10*3/mm3    Basophils, Absolute 0.05 0.00 - 0.20 10*3/mm3    Immature Grans, Absolute 0.03 0.00 - 0.05 10*3/mm3    nRBC 0.0 0.0 - 0.2 /100 WBC   D-dimer, Quantitative    Collection Time: 02/19/25 12:22 PM    Specimen: Arm, Right; Blood   Result Value Ref Range    D-Dimer, Quantitative <0.27 0.00 - 0.50 MCGFEU/mL   High Sensitivity Troponin T 1Hr    Collection Time: 02/19/25  1:32 PM    Specimen: Arm, Right; Blood   Result Value Ref Range    HS Troponin T <6 <14 ng/L    Troponin T Numeric Delta           RADIOLOGY  XR Chest 1 View    Result Date: 2/19/2025  XR CHEST 1 VW-  HISTORY: Female who is 39 years-old, chest pain  TECHNIQUE: A frontal view of the chest  COMPARISON: 2/18/2025  FINDINGS: Heart, mediastinum and pulmonary vasculature are unremarkable. No focal pulmonary consolidation, pleural effusion, or pneumothorax. No acute  osseous process.      No evidence for acute pulmonary process. Follow-up as clinical indications persist.  This report was finalized on 2/19/2025 1:21 PM by Dr. Shad Casiano M.D on Workstation: [id number]      XR Chest 2 View    Result Date: 2/18/2025  XR CHEST 2 VW-2/18/2025  HISTORY: Chest pain.  Heart size is within normal limits. Lungs appear free of acute infiltrates. Bones and soft tissues are unremarkable.      1. No acute process.   This report was finalized on 2/18/2025 4:58 PM by Dr. Chucky Cabral M.D on Workstation: [id number]         MEDICATIONS GIVEN IN ER  Medications   sodium chloride 0.9 % flush 10 mL (has no administration in time range)   aspirin tablet 325 mg (has no administration in time range)         ORDERS PLACED DURING THIS VISIT:  Orders Placed This Encounter   Procedures    XR Chest 1 View    [place] Draw    Comprehensive Metabolic Panel    High Sensitivity Troponin T    CBC Auto Differential    High Sensitivity Troponin T 1Hr    D-dimer, Quantitative    NPO Diet NPO Type: Strict NPO    Undress & Gown    Continuous Pulse Oximetry    Oxygen Therapy- Nasal Cannula; Titrate 1-6 LPM Per SpO2; 90 - 95%    ECG 12 Lead ED Triage Standing Order; Chest Pain    ECG 12 Lead ED Triage Standing Order; Chest Pain    Insert Peripheral IV    CBC & Differential    Green Top (Gel)    Lavender Top    Gold Top - SST    Light Blue Top         OUTPATIENT MEDICATION MANAGEMENT:  Current Facility-Administered Medications Ordered in Epic   Medication Dose Route Frequency Provider Last Rate Last Admin    aspirin tablet 325 mg  325 mg Oral Once Tanya Butler MD        sodium chloride 0.9 % flush 10 mL  10 mL Intravenous PRN Tanya Butler MD         Current Outpatient Medications Ordered in Epic   Medication Sig Dispense Refill    adZENys XR-ODT 15.7 MG Tablet Extended Release Dispersible DISSOLVE 1 TABLET BY MOUTH DAILY.      amphetamine-dextroamphetamine XR (ADDERALL XR) 20 MG 24 hr  "capsule Take 1 capsule by mouth Daily      cyanocobalamin 1000 MCG/ML injection INJECT 1 ML (CC) INTO THE APPROPRIATE MUSCLE AS DIRECTED BY PRESCRIBER ONCE EVERY 14 DAYS.      FLUoxetine (PROzac) 40 MG capsule Take 1 capsule by mouth Daily. For stress 90 capsule 3    ibuprofen (ADVIL,MOTRIN) 200 MG tablet Take 3 tablets by mouth Every 6 (Six) Hours As Needed for Mild Pain. 20 tablet 0    levocetirizine (XYZAL) 5 MG tablet Take 1 tablet by mouth Every Evening for 30 days. 30 tablet 0    linaclotide (Linzess) 145 MCG capsule capsule Take 1 capsule by mouth Every Morning Before Breakfast. 90 capsule 3    LORazepam (Ativan) 0.5 MG tablet Take 1 tablet by mouth Every 8 (Eight) Hours As Needed for Anxiety. 45 tablet 0    nitrofurantoin, macrocrystal-monohydrate, (MACROBID) 100 MG capsule Take 1 capsule by mouth Every 12 (Twelve) Hours. For UTI 14 capsule 0    ondansetron ODT (ZOFRAN-ODT) 4 MG disintegrating tablet Place 1 tablet on the tongue Every 8 (Eight) Hours As Needed for Nausea or Vomiting. 30 tablet 11    oseltamivir (Tamiflu) 75 MG capsule Take 1 capsule by mouth 2 (Two) Times a Day. For Flu 10 capsule 0    pantoprazole (PROTONIX) 40 MG EC tablet Take 1 tablet by mouth Daily. 30 tablet 0    rizatriptan (MAXALT) 10 MG tablet Take 1 tablet by mouth 1 (One) Time As Needed for Migraine for up to 1 dose. May repeat in 2 hours if needed 12 tablet 11    Syringe/Needle, Disp, (B-D 3CC LUER-YVONNE SYR 23GX1\") 23G X 1\" 3 ML misc 1 mL of B12 IM every 14 days 30 each 1    topiramate (TOPAMAX) 100 MG tablet Take 1 tablet by mouth Daily. For migraine suppression 90 tablet 3    traZODone (DESYREL) 50 MG tablet Take 1-2 tablets by mouth At Night As Needed. for sleep           PROCEDURES  Procedures            PROGRESS, DATA ANALYSIS, CONSULTS, AND MEDICAL DECISION MAKING  All labs have been independently interpreted by me.  All radiology studies have been reviewed by me. All EKG's have been independently viewed and interpreted by " me.  Discussion below represents my analysis of pertinent findings related to patient's condition, differential diagnosis, treatment plan and final disposition.    DIFFERENTIAL    DDx includes but is not limited to acute coronary syndrome, pulmonary embolism, thoracic aortic dissection, pneumonia, pneumothorax, musculoskeletal pain, GERD or esophageal spasm, anxiety, myocarditis/pericarditis, esophageal rupture, pancreatitis.     HEART SCORE:    History  Highly suspicious              2    Moderately suspicious             1    Slightly or non-suspicious             0    ECG  Significant ST depression              2    Nonspecific repol disturbance            1    Normal                           0    Age  > or = 65                          2     46-65                           1    < or = 45                          0    Risk factors (hypercholesterolemia, HTN, DM, smoking, pos fam hx, obesity)                            > or = to 3 RF for atherosclerotic dx   2    1 or 2                 1    No risk factors                0    Troponin > or = 3x normal limit               2    1-3x normal limit    1    < or = Normal limit    0        HEART Score Key:  Scores 0-3: 0.9-1.7% risk of adverse cardiac event. In the HEART Score study, these patients were discharged (0.99% in the retrospective study, 1.7% in the prospective study)  Scores 4-6: 12-16.6% risk of adverse cardiac event. In the HEART Score study, these patients were admitted to the hospital. (11.6% retrospective, 16.6% prospective)  Scores >=7: 50-65% risk of adverse cardiac event. In the HEART Score study, these patients were candidates for early invasive measures. (65.2% retrospective, 50.1% prospective)      This patient's HEART score is 1         Clinical Scores:                  ED Course as of 02/19/25 1516   Wed Feb 19, 2025   1240 WBC: 7.83 [KA]   1241 Hemoglobin: 14.1 [KA]   1305 WBC: 7.83 [KA]   1305 Hemoglobin: 14.1 [KA]   1305 HS Troponin T: <6  [KA]   1305 Glucose: 87 [KA]   1305 Creatinine: 0.72 [KA]   1305 ALT (SGPT)(!): 38  Mild elevation, intermittently elevated over the last one year. [KA]   1325 D-Dimer, Quant: <0.27 [KA]      ED Course User Index  [KA] Alfreda Mills PA-C     Patient, counseled her on all of her lab and imaging results.  Serial troponins negative, EKG unremarkable, D-dimer negative, low suspicion for PE, she is not hypoxic or tachycardic, symptoms reproduced with chest wall palpation consistent with chest wall pain.  Offered a dose of anti-inflammatories here, she declined.  Recommended ibuprofen 800 at home, close follow-up with PCP  for continued outpatient evaluation        AS OF 15:16 EST VITALS:    BP - 125/86  HR - 83  TEMP - 98.3 °F (36.8 °C)  O2 SATS - 100%    COMPLEXITY OF CARE  Admission was considered but after careful review of the patient's presentation, physical examination, diagnostic results, and response to treatment the patient may be safely discharged with outpatient follow-up.      DIAGNOSIS  Final diagnoses:   Chest wall pain         DISPOSITION  ED Disposition       ED Disposition   Discharge    Condition   Good    Comment   --                  FOLLOW UP  Cora Palmer PA-C  [address] [address] [address]  [facility] [id number] [phone]    In 2 days      [facility] EMERGENCY DEPARTMENT  [address] [facility] [address] [phone]    If symptoms worsen or any concerns        Prescribed Medications     Medication List      No changes were made to your prescriptions during this visit.                   Please note that portions of this document were completed with a voice recognition program.    Note Disclaimer: At [facility], we believe that sharing information builds trust and better relationships. You are receiving this note because you recently visited [facility]. It is possible you will see health information before a provider has talked with you about it. This  kind of information can be easy to misunderstand. To help you fully understand what it means for your health, we urge you to discuss this note with your provider.         Alfreda Mills PA-C  02/19/25 1515

## 2025-02-19 NOTE — ED PROVIDER NOTES
MD ATTESTATION NOTE    SHARED VISIT: This visit was performed by BOTH a physician and an APC. The substantive portion of the medical decision making was performed by this attesting physician who made or approved the management plan and takes responsibility for patient management. All studies in the APC note (if performed) were independently interpreted by me.     The YORDAN and I have discussed this patient's history, physical exam, and treatment plan.  I have reviewed the documentation and affirm the documentation and agree with the treatment and plan.  The attached note describes my personal findings.      Independent Historians: Patient    A complete HPI/ROS/PMH/PSH/SH/FH are unobtainable due to: None    Chronic or social conditions impacting patient care (social determinants of health): None    Adry Knight is a 39 y.o. female who presents to the ED c/o acute cp and shortness of breath.  Pt with left sided chest discomfort for past 2 days in setting of having had covid-19 about 10 days ago. Pt's uri symptoms have mostly resolved.  Pt went to urgent care where she was told her EKG was abnormal so pt came to ER for further eval. Pt with no other PE or dvt risk factors other than OCP about 2 weeks ago but only 3 days of therapy          On exam:  GENERAL: pleasant cooperative and conversant well appearing f, alert, no acute distress  SKIN: Warm, dry  HENT: Normocephalic, atraumatic  EYES: no scleral icterus  CV: regular rhythm, regular rate  RESPIRATORY: normal effort, lungs clear, no wheezing  ABDOMEN: soft, nontender, nondistended  MUSCULOSKELETAL: no deformity  NEURO: alert, moves all extremities, follows commands                                                             Labs  Recent Results (from the past 24 hours)   ECG 12 Lead ED Triage Standing Order; Chest Pain    Collection Time: 02/19/25 12:19 PM   Result Value Ref Range    QT Interval 401 ms    QTC Interval 439 ms   Comprehensive Metabolic Panel     Collection Time: 02/19/25 12:22 PM    Specimen: Arm, Right; Blood   Result Value Ref Range    Glucose 87 65 - 99 mg/dL    BUN 11 6 - 20 mg/dL    Creatinine 0.72 0.57 - 1.00 mg/dL    Sodium 140 136 - 145 mmol/L    Potassium 3.8 3.5 - 5.2 mmol/L    Chloride 106 98 - 107 mmol/L    CO2 26.7 22.0 - 29.0 mmol/L    Calcium 9.2 8.6 - 10.5 mg/dL    Total Protein 6.7 6.0 - 8.5 g/dL    Albumin 3.5 3.5 - 5.2 g/dL    ALT (SGPT) 38 (H) 1 - 33 U/L    AST (SGOT) 18 1 - 32 U/L    Alkaline Phosphatase 83 39 - 117 U/L    Total Bilirubin 0.2 0.0 - 1.2 mg/dL    Globulin 3.2 gm/dL    A/G Ratio 1.1 g/dL    BUN/Creatinine Ratio 15.3 7.0 - 25.0    Anion Gap 7.3 5.0 - 15.0 mmol/L    eGFR 110.6 >60.0 mL/min/1.73   High Sensitivity Troponin T    Collection Time: 02/19/25 12:22 PM    Specimen: Arm, Right; Blood   Result Value Ref Range    HS Troponin T <6 <14 ng/L   Green Top (Gel)    Collection Time: 02/19/25 12:22 PM   Result Value Ref Range    Extra Tube Hold for add-ons.    Lavender Top    Collection Time: 02/19/25 12:22 PM   Result Value Ref Range    Extra Tube hold for add-on    Gold Top - SST    Collection Time: 02/19/25 12:22 PM   Result Value Ref Range    Extra Tube Hold for add-ons.    Light Blue Top    Collection Time: 02/19/25 12:22 PM   Result Value Ref Range    Extra Tube Hold for add-ons.    CBC Auto Differential    Collection Time: 02/19/25 12:22 PM    Specimen: Arm, Right; Blood   Result Value Ref Range    WBC 7.83 3.40 - 10.80 10*3/mm3    RBC 4.75 3.77 - 5.28 10*6/mm3    Hemoglobin 14.1 12.0 - 15.9 g/dL    Hematocrit 42.5 34.0 - 46.6 %    MCV 89.5 79.0 - 97.0 fL    MCH 29.7 26.6 - 33.0 pg    MCHC 33.2 31.5 - 35.7 g/dL    RDW 13.0 12.3 - 15.4 %    RDW-SD 42.8 37.0 - 54.0 fl    MPV 10.0 6.0 - 12.0 fL    Platelets 312 140 - 450 10*3/mm3    Neutrophil % 67.7 42.7 - 76.0 %    Lymphocyte % 21.1 19.6 - 45.3 %    Monocyte % 9.2 5.0 - 12.0 %    Eosinophil % 1.0 0.3 - 6.2 %    Basophil % 0.6 0.0 - 1.5 %    Immature Grans % 0.4 0.0 - 0.5  %    Neutrophils, Absolute 5.30 1.70 - 7.00 10*3/mm3    Lymphocytes, Absolute 1.65 0.70 - 3.10 10*3/mm3    Monocytes, Absolute 0.72 0.10 - 0.90 10*3/mm3    Eosinophils, Absolute 0.08 0.00 - 0.40 10*3/mm3    Basophils, Absolute 0.05 0.00 - 0.20 10*3/mm3    Immature Grans, Absolute 0.03 0.00 - 0.05 10*3/mm3    nRBC 0.0 0.0 - 0.2 /100 WBC   D-dimer, Quantitative    Collection Time: 02/19/25 12:22 PM    Specimen: Arm, Right; Blood   Result Value Ref Range    D-Dimer, Quantitative <0.27 0.00 - 0.50 MCGFEU/mL   High Sensitivity Troponin T 1Hr    Collection Time: 02/19/25  1:32 PM    Specimen: Arm, Right; Blood   Result Value Ref Range    HS Troponin T <6 <14 ng/L    Troponin T Numeric Delta         Radiology  XR Chest 1 View    Result Date: 2/19/2025  XR CHEST 1 VW-  HISTORY: Female who is 39 years-old, chest pain  TECHNIQUE: A frontal view of the chest  COMPARISON: 2/18/2025  FINDINGS: Heart, mediastinum and pulmonary vasculature are unremarkable. No focal pulmonary consolidation, pleural effusion, or pneumothorax. No acute osseous process.      No evidence for acute pulmonary process. Follow-up as clinical indications persist.  This report was finalized on 2/19/2025 1:21 PM by Dr. Shad Casiano M.D on Workstation: [id number]       Medical Decision Making:  ED Course as of 02/19/25 2142 Wed Feb 19, 2025   1240 WBC: 7.83 [KA]   1241 Hemoglobin: 14.1 [KA]   1305 WBC: 7.83 [KA]   1305 Hemoglobin: 14.1 [KA]   1305 HS Troponin T: <6 [KA]   1305 Glucose: 87 [KA]   1305 Creatinine: 0.72 [KA]   1305 ALT (SGPT)(!): 38  Mild elevation, intermittently elevated over the last one year. [KA]   1325 D-Dimer, Quant: <0.27 [KA]      ED Course User Index  [KA] Alfreda Mills PA-C       MDM: This patient presents with chest pain that is more atypical in nature, and most likely secondary to non-cardiac causes. The differential diagnosis includes emergent causes like ACS, PE, pericarditis, myocarditis, thoracic aortic dissection,  pneumothorax, pulmonary effusion, and pneumonia. It also includes more benign causes like bronchitis, gerd, esophageal spasm, anxiety/panic, pleurisy, costochondritis/chest wall pain, and biliary colic. I have low suspicion for acute PE (Wells), pericarditis / myocarditis (although patient did have preceeding uri), thoracic aortic dissection (lack of risk factors), pneumothorax (clear to auscultation bilaterally to bases on exam), pneumonia or other acute infectious process (lack of infectious symptoms like cough). Plan to order CXR, serum labs including screening troponin and d dimer, and EKG while monitoring patient and providing pain control as needed. Will reassess.    Wells score for PE   Clinical signs and symptoms of DVT?  NO (Yes = 3 points)  PE is #1 diagnosis OR equally likely?  Y  (Yes = 3 points)  Heart rate > 100? NO  (Yes = 1.5 points)  Immobilization at least 3 days OR surgery in the previous 4 weeks? NO  (Yes = 1.5 points)  Previous, objectively diagnosed PE or DVT? NO  (Yes = 1.5 points)  Hemoptysis?  NO (Yes = 1 point)  Malignancy with treatment within 6 months or palliative? NO (Yes = 1 point)    Low risk < 2 points (1.3% incidence of PE) - use dimer to rule out  Moderate risk score 2-6 points (16.2% incidence of PE) - use dimer to rule out +/- CTA  High risk score >6points (37.5% incidence of PE) - CTA       Procedures:  Procedures        PPE: I followed hospital protocols for proper PPE based on patient presentation including use of N95 mask for suspected infectious respiratory conditions.  Proper hand hygiene was performed both before and after the patient encounter.          Diagnosis  Final diagnoses:   Chest wall pain       Note Disclaimer: At [facility], we believe that sharing information builds trust and better relationships. You are receiving this note because you recently visited [facility]. It is possible you will see health information before a provider has talked with you  about it. This kind of information can be easy to misunderstand. To help you fully understand what it means for your health, we urge you to discuss this note with your provider.       Tanya Butler MD  02/20/25 [id number]

## 2025-02-28 DIAGNOSIS — R63.5 WEIGHT GAIN: ICD-10-CM

## 2025-02-28 DIAGNOSIS — R60.0 PEDAL EDEMA: ICD-10-CM

## 2025-02-28 DIAGNOSIS — R79.89 ELEVATED LFTS: Primary | ICD-10-CM

## 2025-02-28 RX ORDER — FUROSEMIDE 20 MG/1
20 TABLET ORAL DAILY
Qty: 5 TABLET | Refills: 0 | Status: SHIPPED | OUTPATIENT
Start: 2025-02-28

## 2025-02-28 RX ORDER — POTASSIUM CHLORIDE 750 MG/1
10 TABLET, EXTENDED RELEASE ORAL DAILY
Qty: 5 TABLET | Refills: 0 | Status: SHIPPED | OUTPATIENT
Start: 2025-02-28

## 2025-03-25 ENCOUNTER — OFFICE VISIT (OUTPATIENT)
Dept: FAMILY MEDICINE CLINIC | Facility: CLINIC | Age: 40
End: 2025-03-25
Payer: COMMERCIAL

## 2025-03-25 VITALS
TEMPERATURE: 97.3 F | BODY MASS INDEX: 37.21 KG/M2 | HEIGHT: 63 IN | DIASTOLIC BLOOD PRESSURE: 86 MMHG | RESPIRATION RATE: 16 BRPM | HEART RATE: 76 BPM | SYSTOLIC BLOOD PRESSURE: 122 MMHG | WEIGHT: 210 LBS | OXYGEN SATURATION: 99 %

## 2025-03-25 DIAGNOSIS — R79.89 ELEVATED LFTS: ICD-10-CM

## 2025-03-25 DIAGNOSIS — G43.711 INTRACTABLE CHRONIC MIGRAINE WITHOUT AURA AND WITH STATUS MIGRAINOSUS: ICD-10-CM

## 2025-03-25 DIAGNOSIS — R63.5 WEIGHT GAIN: Primary | ICD-10-CM

## 2025-03-25 DIAGNOSIS — Z14.8 HEMOCHROMATOSIS CARRIER: ICD-10-CM

## 2025-03-25 DIAGNOSIS — K21.9 GASTROESOPHAGEAL REFLUX DISEASE WITHOUT ESOPHAGITIS: ICD-10-CM

## 2025-03-25 DIAGNOSIS — E53.8 LOW SERUM VITAMIN B12: ICD-10-CM

## 2025-03-25 DIAGNOSIS — I73.00 RAYNAUD'S DISEASE WITHOUT GANGRENE: ICD-10-CM

## 2025-03-25 DIAGNOSIS — Z83.49 FAMILY HISTORY OF THYROID DISEASE IN SISTER: ICD-10-CM

## 2025-03-25 DIAGNOSIS — F33.42 RECURRENT MAJOR DEPRESSIVE DISORDER, IN FULL REMISSION: ICD-10-CM

## 2025-03-25 PROCEDURE — 99214 OFFICE O/P EST MOD 30 MIN: CPT | Performed by: PHYSICIAN ASSISTANT

## 2025-03-25 NOTE — PROGRESS NOTES
"Sravan Knight is a 39 y.o. female.     Chest Pain   Pertinent negatives include no diaphoresis.       Since the last visit, she has overall felt tired.  She has Vitamin D deficiency and labs are at goal >30 ng/mL, Migraine headaches and responding well to PRN triptan or CGPR inhibitor, and Migraine headaches and well controlled on suppression medication.  she has been compliant with current medications have reviewed them.  The patient denies medication side effects.  Will refill medications. /86   Pulse 76   Temp 97.3 °F (36.3 °C) (Temporal)   Resp 16   Ht 160 cm (63\")   Wt 95.3 kg (210 lb)   SpO2 99%   BMI 37.20 kg/m² .      Since Dec ---weight is up 35 lbs    Results for orders placed or performed during the hospital encounter of 02/19/25   ECG 12 Lead ED Triage Standing Order; Chest Pain    Collection Time: 02/19/25 12:19 PM   Result Value Ref Range    QT Interval 401 ms    QTC Interval 439 ms   Comprehensive Metabolic Panel    Collection Time: 02/19/25 12:22 PM    Specimen: Arm, Right; Blood   Result Value Ref Range    Glucose 87 65 - 99 mg/dL    BUN 11 6 - 20 mg/dL    Creatinine 0.72 0.57 - 1.00 mg/dL    Sodium 140 136 - 145 mmol/L    Potassium 3.8 3.5 - 5.2 mmol/L    Chloride 106 98 - 107 mmol/L    CO2 26.7 22.0 - 29.0 mmol/L    Calcium 9.2 8.6 - 10.5 mg/dL    Total Protein 6.7 6.0 - 8.5 g/dL    Albumin 3.5 3.5 - 5.2 g/dL    ALT (SGPT) 38 (H) 1 - 33 U/L    AST (SGOT) 18 1 - 32 U/L    Alkaline Phosphatase 83 39 - 117 U/L    Total Bilirubin 0.2 0.0 - 1.2 mg/dL    Globulin 3.2 gm/dL    A/G Ratio 1.1 g/dL    BUN/Creatinine Ratio 15.3 7.0 - 25.0    Anion Gap 7.3 5.0 - 15.0 mmol/L    eGFR 110.6 >60.0 mL/min/1.73   High Sensitivity Troponin T    Collection Time: 02/19/25 12:22 PM    Specimen: Arm, Right; Blood   Result Value Ref Range    HS Troponin T <6 <14 ng/L   CBC Auto Differential    Collection Time: 02/19/25 12:22 PM    Specimen: Arm, Right; Blood   Result Value Ref Range    WBC " 7.83 3.40 - 10.80 10*3/mm3    RBC 4.75 3.77 - 5.28 10*6/mm3    Hemoglobin 14.1 12.0 - 15.9 g/dL    Hematocrit 42.5 34.0 - 46.6 %    MCV 89.5 79.0 - 97.0 fL    MCH 29.7 26.6 - 33.0 pg    MCHC 33.2 31.5 - 35.7 g/dL    RDW 13.0 12.3 - 15.4 %    RDW-SD 42.8 37.0 - 54.0 fl    MPV 10.0 6.0 - 12.0 fL    Platelets 312 140 - 450 10*3/mm3    Neutrophil % 67.7 42.7 - 76.0 %    Lymphocyte % 21.1 19.6 - 45.3 %    Monocyte % 9.2 5.0 - 12.0 %    Eosinophil % 1.0 0.3 - 6.2 %    Basophil % 0.6 0.0 - 1.5 %    Immature Grans % 0.4 0.0 - 0.5 %    Neutrophils, Absolute 5.30 1.70 - 7.00 10*3/mm3    Lymphocytes, Absolute 1.65 0.70 - 3.10 10*3/mm3    Monocytes, Absolute 0.72 0.10 - 0.90 10*3/mm3    Eosinophils, Absolute 0.08 0.00 - 0.40 10*3/mm3    Basophils, Absolute 0.05 0.00 - 0.20 10*3/mm3    Immature Grans, Absolute 0.03 0.00 - 0.05 10*3/mm3    nRBC 0.0 0.0 - 0.2 /100 WBC   D-dimer, Quantitative    Collection Time: 02/19/25 12:22 PM    Specimen: Arm, Right; Blood   Result Value Ref Range    D-Dimer, Quantitative <0.27 0.00 - 0.50 MCGFEU/mL   Green Top (Gel)    Collection Time: 02/19/25 12:22 PM   Result Value Ref Range    Extra Tube Hold for add-ons.    Lavender Top    Collection Time: 02/19/25 12:22 PM   Result Value Ref Range    Extra Tube hold for add-on    Gold Top - SST    Collection Time: 02/19/25 12:22 PM   Result Value Ref Range    Extra Tube Hold for add-ons.    Light Blue Top    Collection Time: 02/19/25 12:22 PM   Result Value Ref Range    Extra Tube Hold for add-ons.    High Sensitivity Troponin T 1Hr    Collection Time: 02/19/25  1:32 PM    Specimen: Arm, Right; Blood   Result Value Ref Range    HS Troponin T <6 <14 ng/L    Troponin T Numeric Delta       No hx gest DM  No change in diet  Has been to dietician in past  No exercise  Puffy legs  She did not take Lasix for edema---sent 5 day    Went to the ER for upper respiratory tract infection on 2/8/2025 had positive COVID and started her on Augmentin  Went back to ER  2/18/2025 and 2-19-25 and was having acute chest pain--- EKG was read as abnormal and I discussed EKG from this date and 2/19/2025 with Dr. Galvin in person and EKG was fine was normal.  She had a PA and lateral chest x-ray on 2/18/2025 noting all negative and had a portable chest x-ray on 2/19/2025 noting negative.  Had labs labs on 2/18/2025 including COVID and flu testing negative.  2/19/2025 had labs for troponin, D-dimer, CMP, CBC--D-dimer negative and troponin negative  ALT was 38  No more chest pain.  No SOA  Liver ultrasound on 7/22/2024 was normal liver    Last visit with hematology was by telehealth with Dr. Gomez on 2/10/2025 noting she is a hemochromatosis carrier and reviewed labs including genetic testing noting her mutation noting iron was not elevated and she does need monitoring plan for labs again with follow-up 1 year  Last GI visit was 6/3/2024 saw SHANON Mendosa for my concern of elevated LFTs with normal liver ultrasound also following up on gastroparesis and chronic constipation prescribed Linzess at that visit.  She is currently off Linzess and having daily bowel movements again    Has been to [facility]  Sees Vinay QUIROS for ADD only  Remains on Topamax for migraine suppression and does work also has Maxalt for acute migraine  I still have her on Prozac for anxiety and depression    Vitamin B12 is in lower range.  She remains on B12 injections monthly ----I do advise every 2 weeks ....I will update lab    Has been on Mounjaro in the past and caused severe GI distress  Also note her sisters have Hashimoto's disease and needs to have thyroid labs and I will get thyroid antibodies    Episodes of her fourth left finger turning blue--- concern for Raynolds  The following portions of the patient's history were reviewed and updated as appropriate: allergies, current medications, past family history, past medical history, past social history, past surgical history, and problem  list.    Review of Systems   Constitutional:  Positive for fatigue. Negative for diaphoresis.   HENT:  Negative for nosebleeds and trouble swallowing.    Eyes:  Negative for blurred vision and visual disturbance.   Respiratory:  Negative for choking.    Cardiovascular:  Negative for chest pain.   Gastrointestinal:  Negative for blood in stool.   Allergic/Immunologic: Negative for immunocompromised state.   Neurological:  Negative for facial asymmetry and speech difficulty.   Psychiatric/Behavioral:  Negative for self-injury and suicidal ideas.        Objective   Physical Exam  Vitals and nursing note reviewed.   Constitutional:       General: She is not in acute distress.     Appearance: She is well-developed. She is obese. She is not ill-appearing or toxic-appearing.   HENT:      Head: Normocephalic.      Right Ear: External ear normal.      Left Ear: External ear normal.      Nose: Nose normal.      Mouth/Throat:      Pharynx: Oropharynx is clear.   Eyes:      General: No scleral icterus.     Conjunctiva/sclera: Conjunctivae normal.      Pupils: Pupils are equal, round, and reactive to light.   Neck:      Thyroid: No thyromegaly.      Vascular: No carotid bruit.   Cardiovascular:      Rate and Rhythm: Normal rate and regular rhythm.      Heart sounds: Normal heart sounds. No murmur heard.  Pulmonary:      Effort: Pulmonary effort is normal. No respiratory distress.      Breath sounds: Normal breath sounds. No rales.   Musculoskeletal:         General: No deformity. Normal range of motion.      Cervical back: Normal range of motion and neck supple.      Right lower leg: Edema present.      Left lower leg: Edema present.   Skin:     General: Skin is warm and dry.      Findings: No rash.   Neurological:      General: No focal deficit present.      Mental Status: She is alert and oriented to person, place, and time. Mental status is at baseline.   Psychiatric:         Mood and Affect: Mood normal.         Behavior:  Behavior normal.         Thought Content: Thought content normal.         Judgment: Judgment normal.           Assessment & Plan   Diagnoses and all orders for this visit:    1. Weight gain (Primary)  -     Comprehensive Metabolic Panel  -     Hemoglobin A1c  -     C-Peptide  -     T4, Free  -     T3, Free  -     Thyroid Antibodies  -     Vitamin B12  -     Magnesium    2. Hemochromatosis carrier  -     Comprehensive Metabolic Panel  -     Hemoglobin A1c  -     C-Peptide  -     T4, Free  -     T3, Free  -     Thyroid Antibodies  -     Vitamin B12  -     Magnesium    3. Low serum vitamin B12  -     Comprehensive Metabolic Panel  -     Hemoglobin A1c  -     C-Peptide  -     T4, Free  -     T3, Free  -     Thyroid Antibodies  -     Vitamin B12  -     Magnesium    4. Elevated LFTs  -     Comprehensive Metabolic Panel  -     Hemoglobin A1c  -     C-Peptide  -     T4, Free  -     T3, Free  -     Thyroid Antibodies  -     Vitamin B12  -     Magnesium    5. Intractable chronic migraine without aura and with status migrainosus  -     Comprehensive Metabolic Panel  -     Hemoglobin A1c  -     C-Peptide  -     T4, Free  -     T3, Free  -     Thyroid Antibodies  -     Vitamin B12  -     Magnesium    6. Recurrent major depressive disorder, in full remission  -     Comprehensive Metabolic Panel  -     Hemoglobin A1c  -     C-Peptide  -     T4, Free  -     T3, Free  -     Thyroid Antibodies  -     Vitamin B12  -     Magnesium    7. Gastroesophageal reflux disease without esophagitis  -     Comprehensive Metabolic Panel  -     Hemoglobin A1c  -     C-Peptide  -     T4, Free  -     T3, Free  -     Thyroid Antibodies  -     Vitamin B12  -     Magnesium    8. Family history of thyroid disease in sister  -     Comprehensive Metabolic Panel  -     Hemoglobin A1c  -     C-Peptide  -     T4, Free  -     T3, Free  -     Thyroid Antibodies  -     Vitamin B12  -     Magnesium      Plan to continue Prozac for now it is working for her  depression  Sees CHULA Muhammad psychiatry for ADD and is on stimulant  Plan to continue Topamax for migraine suppression and Maxalt for acute migraine that is working  Followed by hematology Dr. Gomez for hemochromatosis mutation surveillance  Also note her sisters have Hashimoto's disease and needs to have thyroid labs and I will get thyroid antibodies----concerned with her weight gain we will also check hemoglobin A1c and C-peptide for insulin resistance  Asking about acarbose?    See GI     Will also get labs concern for Raynaud's disease due to the blue discoloration changes that she gets in her left fourth finger will get inflammatory markers  GERD is controlled on PPI Rx.  Continue B12 injections will update lab  Plan, Adry Knight, was seen today.  she was seen for GERD and will continue on PPI medication, Vitamin D deficiency and supplemented, Migraine headaches and will continue with their PRN triptan or CGRP inhibitor, and Migraine headaches and well controlled on their suppressive medication.  Consider referral to endocrinology

## 2025-03-26 LAB
ALBUMIN SERPL-MCNC: 4.6 G/DL (ref 3.9–4.9)
ALP SERPL-CCNC: 94 IU/L (ref 44–121)
ALT SERPL-CCNC: 35 IU/L (ref 0–32)
ANA SER QL: NEGATIVE
AST SERPL-CCNC: 24 IU/L (ref 0–40)
BILIRUB SERPL-MCNC: 0.4 MG/DL (ref 0–1.2)
BUN SERPL-MCNC: 15 MG/DL (ref 6–20)
BUN/CREAT SERPL: 16 (ref 9–23)
C PEPTIDE SERPL-MCNC: 3 NG/ML (ref 1.1–4.4)
CALCIUM SERPL-MCNC: 9.5 MG/DL (ref 8.7–10.2)
CHLORIDE SERPL-SCNC: 102 MMOL/L (ref 96–106)
CO2 SERPL-SCNC: 19 MMOL/L (ref 20–29)
CREAT SERPL-MCNC: 0.92 MG/DL (ref 0.57–1)
CRP SERPL-MCNC: 2 MG/L (ref 0–10)
EGFRCR SERPLBLD CKD-EPI 2021: 82 ML/MIN/1.73
ERYTHROCYTE [SEDIMENTATION RATE] IN BLOOD BY WESTERGREN METHOD: 15 MM/HR (ref 0–32)
GLOBULIN SER CALC-MCNC: 2.6 G/DL (ref 1.5–4.5)
GLUCOSE SERPL-MCNC: 80 MG/DL (ref 70–99)
MAGNESIUM SERPL-MCNC: 2.3 MG/DL (ref 1.6–2.3)
POTASSIUM SERPL-SCNC: 4.1 MMOL/L (ref 3.5–5.2)
PROT SERPL-MCNC: 7.2 G/DL (ref 6–8.5)
SODIUM SERPL-SCNC: 140 MMOL/L (ref 134–144)
T3FREE SERPL-MCNC: 3.7 PG/ML (ref 2–4.4)
T4 FREE SERPL-MCNC: 1.1 NG/DL (ref 0.82–1.77)
THYROGLOB AB SERPL-ACNC: 1 IU/ML (ref 0–0.9)
THYROPEROXIDASE AB SERPL-ACNC: 13 IU/ML (ref 0–34)
TSH SERPL DL<=0.005 MIU/L-ACNC: 2.06 UIU/ML (ref 0.45–4.5)
VIT B12 SERPL-MCNC: 631 PG/ML (ref 232–1245)

## 2025-04-28 ENCOUNTER — OFFICE VISIT (OUTPATIENT)
Dept: ENDOCRINOLOGY | Age: 40
End: 2025-04-28
Payer: COMMERCIAL

## 2025-04-28 VITALS
BODY MASS INDEX: 37.02 KG/M2 | SYSTOLIC BLOOD PRESSURE: 120 MMHG | HEART RATE: 92 BPM | TEMPERATURE: 98.1 F | DIASTOLIC BLOOD PRESSURE: 82 MMHG | OXYGEN SATURATION: 98 % | WEIGHT: 209 LBS

## 2025-04-28 DIAGNOSIS — R94.6 ABNORMAL THYROID FUNCTION TEST: Primary | ICD-10-CM

## 2025-04-28 PROCEDURE — 99204 OFFICE O/P NEW MOD 45 MIN: CPT | Performed by: INTERNAL MEDICINE

## 2025-04-28 RX ORDER — ASCORBIC ACID 1000 MG
TABLET ORAL
COMMUNITY

## 2025-04-28 RX ORDER — BUPROPION HYDROCHLORIDE 150 MG/1
1 TABLET ORAL DAILY
COMMUNITY
Start: 2025-03-31

## 2025-04-28 NOTE — PROGRESS NOTES
Chief complaint   Chief Complaint   Patient presents with    Thyroid dysfunction          Subjective     History of Present Illness:      This is a 39 years old female I am seeing for thyroid disease   referred by PCP     other medical issues   1- AAT def  2- Lactose intolerance   3- GERD and gastroparesis   4- Other   Pt was Dx with abnormal thyroid levels by PC   Is not on thyroid medications   had blood work done by PCP in 3-2025: TSH was 0.11, t4 free was 1.6     pt states that she is feeling tired and Weight is going up and there is no history head and Neck radiation, no History of thyroid surgery + family history of Thyroid disease 2 sister , No prior history of Thyroid Dysfunction,  and has No dysphagia, No dyspnea, No dysphonia, No change size of neck, No neck pain or discomfort  No nervousness, No shakiness, No palpitations         Latest Reference Range & Units 03/25/25 09:37   TSH Baseline 0.450 - 4.500 uIU/mL 2.060   Free T4 0.82 - 1.77 ng/dL 1.10   T3, Free 2.0 - 4.4 pg/mL 3.7   Thyroglobulin Ab 0.0 - 0.9 IU/mL 1.0 (H)   Thyroid Peroxidase Antibody 0 - 34 IU/mL 13   (H): Data is abnormally high    Family History   Problem Relation Age of Onset    Depression Mother     Irritable bowel syndrome Mother     Alcohol abuse Mother     Asthma Mother     Anxiety disorder Mother     Arthritis Mother     Cancer Sister     Celiac disease Sister     Cancer Paternal Grandmother     Celiac disease Sister     Gallbladder disease Sister     Irritable bowel syndrome Sister     Irritable bowel syndrome Maternal Grandmother     Alcohol abuse Maternal Grandmother     Depression Maternal Grandmother     Mental illness Maternal Grandmother     Alcohol abuse Brother     Asthma Brother     Malig Hyperthermia Neg Hx      Social History     Socioeconomic History    Marital status:    Tobacco Use    Smoking status: Never     Passive exposure: Never    Smokeless tobacco: Never   Vaping Use    Vaping status: Never Used    Substance and Sexual Activity    Alcohol use: No    Drug use: No    Sexual activity: Yes     Partners: Male     Birth control/protection: Condom     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;  Service: General    COLONOSCOPY N/A 02/04/2020    Procedure: COLONOSCOPY to cecum;  Surgeon: Marleni Alas MD;  Location:  [facility];  Service: Gastroenterology;  Laterality: N/A;  pre - abd pain  post - hemorrhoids    COLONOSCOPY  2/4/2020    ENDOSCOPY N/A 02/04/2020    Procedure: ESOPHAGOGASTRODUODENOSCOPY with cold bx;  Surgeon: Marleni Alas MD;  Location:  [facility];  Service: Gastroenterology;  Laterality: N/A;  pre - gerd, abd pain  post - gastritis    TONSILLECTOMY      UPPER GASTROINTESTINAL ENDOSCOPY N/A 09/21/2015    Normal esophagus, Normal stomach, Normal duodenum, Dr. Marleni Alas    VAGINAL DELIVERY N/A 10/08/2012    Dr. Brittney Wesley       Current Outpatient Medications:     amphetamine-dextroamphetamine XR (ADDERALL XR) 20 MG 24 hr capsule, Take 1 capsule by mouth Daily, Disp: , Rfl:     buPROPion XL (WELLBUTRIN XL) 150 MG 24 hr tablet, Take 1 tablet by mouth Daily., Disp: , Rfl:     cyanocobalamin 1000  "MCG/ML injection, INJECT 1 ML (CC) INTO THE APPROPRIATE MUSCLE AS DIRECTED BY PRESCRIBER ONCE EVERY 14 DAYS., Disp: , Rfl:     Ginger, Zingiber officinalis, (Ginger Root) 500 MG capsule, Take  by mouth., Disp: , Rfl:     ibuprofen (ADVIL,MOTRIN) 200 MG tablet, Take 3 tablets by mouth Every 6 (Six) Hours As Needed for Mild Pain., Disp: 20 tablet, Rfl: 0    LORazepam (Ativan) 0.5 MG tablet, Take 1 tablet by mouth Every 8 (Eight) Hours As Needed for Anxiety., Disp: 45 tablet, Rfl: 0    nitrofurantoin, macrocrystal-monohydrate, (MACROBID) 100 MG capsule, Take 1 capsule by mouth Every 12 (Twelve) Hours. For UTI, Disp: 14 capsule, Rfl: 0    ondansetron ODT (ZOFRAN-ODT) 4 MG disintegrating tablet, Place 1 tablet on the tongue Every 8 (Eight) Hours As Needed for Nausea or Vomiting., Disp: 30 tablet, Rfl: 11    pantoprazole (PROTONIX) 40 MG EC tablet, Take 1 tablet by mouth Daily., Disp: 30 tablet, Rfl: 0    rizatriptan (MAXALT) 10 MG tablet, Take 1 tablet by mouth 1 (One) Time As Needed for Migraine for up to 1 dose. May repeat in 2 hours if needed, Disp: 12 tablet, Rfl: 11    Syringe/Needle, Disp, (B-D 3CC LUER-YVONNE SYR 23GX1\") 23G X 1\" 3 ML misc, 1 mL of B12 IM every 14 days, Disp: 30 each, Rfl: 1    topiramate (TOPAMAX) 100 MG tablet, Take 1 tablet by mouth Daily. For migraine suppression, Disp: 90 tablet, Rfl: 3    FLUoxetine (PROzac) 40 MG capsule, Take 1 capsule by mouth Daily. For stress (Patient not taking: Reported on 4/28/2025), Disp: 90 capsule, Rfl: 3    furosemide (Lasix) 20 MG tablet, Take 1 tablet by mouth Daily. With KCl X 5 days for edema (Patient not taking: Reported on 4/28/2025), Disp: 5 tablet, Rfl: 0    levocetirizine (XYZAL) 5 MG tablet, Take 1 tablet by mouth Every Evening for 30 days., Disp: 30 tablet, Rfl: 0    potassium chloride 10 MEQ CR tablet, Take 1 tablet by mouth Daily. on days taking Lasix (Patient not taking: Reported on 4/28/2025), Disp: 5 tablet, Rfl: 0  Chocolate, Contrast dye (echo or " unknown ct/mr), Sulfa antibiotics, Beef-derived drug products, Gelatin, and Rice allergy skin test    Objective   Vitals:    04/28/25 1042   BP: 120/82   Pulse: 92   Temp: 98.1 °F (36.7 °C)   SpO2: 98%          Physical Exam  Neurological:      General: No focal deficit present.      Mental Status: She is alert and oriented to person, place, and time.               Diagnoses and all orders for this visit:    1. Abnormal thyroid function test (Primary)         Assessment:     This is a 39 years old female I am seeing for thyroid disease, referred by PCP   Is not on thyroid medications now   No thyroid imaging on file   Pt was Dx with abnormal thyroid levels after she had blood work done by PCP in 3-2025: TSH was 0.11, t4 free was 1.6 , Tpo AB was N , TG was high   We had a long talk about thyroid testing and as long as TSH and T4 were normal no treatment is needed       Plan:    1. No thyroid treatment is needed   2. Labs every 6-12  months :TSH, Free T4    3. Does not have Hashimoto disease as TPO AB was normal   4. Return to   5. Was informed that I a high TG test only indicated that she has a thyroid gland in her body and is not used to indicate treatment and this test is mainly done after a total thyroidectomy in cases of thyroid cancer   6. Labs reviewed with the Patient : TSH   7- I reviewed the notes from PCP   8- Needs to go back on PCP for all of the other sx she is having       I discussed with the patient/legal representative the risks and the benefits associated with the medications.  The patient/legal representative has been given the opportunity to ask questions.  Alternatives to the proposed treatment (s) were discussed, including the likely results of no treatment.  The patient/legal representative wishes to proceed.       Sampson Wiley MD   04/28/25  11:01 EDT

## 2025-05-02 ENCOUNTER — PATIENT ROUNDING (BHMG ONLY) (OUTPATIENT)
Dept: ENDOCRINOLOGY | Age: 40
End: 2025-05-02
Payer: COMMERCIAL

## 2025-06-14 RX ORDER — RIZATRIPTAN BENZOATE 10 MG/1
TABLET ORAL
Qty: 12 TABLET | Refills: 0 | Status: SHIPPED | OUTPATIENT
Start: 2025-06-14

## 2025-06-23 ENCOUNTER — HOSPITAL ENCOUNTER (EMERGENCY)
Facility: HOSPITAL | Age: 40
Discharge: HOME | End: 2025-06-24
Attending: EMERGENCY MEDICINE | Admitting: EMERGENCY MEDICINE
Payer: COMMERCIAL

## 2025-06-23 ENCOUNTER — APPOINTMENT (OUTPATIENT)
Dept: CT IMAGING | Facility: HOSPITAL | Age: 40
End: 2025-06-23
Payer: COMMERCIAL

## 2025-06-23 DIAGNOSIS — K52.9 COLITIS: Primary | ICD-10-CM

## 2025-06-23 LAB
ALBUMIN SERPL-MCNC: 4.4 G/DL (ref 3.5–5.2)
ALBUMIN/GLOB SERPL: 1.6 G/DL
ALP SERPL-CCNC: 104 U/L (ref 39–117)
ALT SERPL W P-5'-P-CCNC: 30 U/L (ref 1–33)
ANION GAP SERPL CALCULATED.3IONS-SCNC: 11.8 MMOL/L (ref 5–15)
AST SERPL-CCNC: 17 U/L (ref 1–32)
BASOPHILS # BLD AUTO: 0.04 10*3/MM3 (ref 0–0.2)
BASOPHILS NFR BLD AUTO: 0.4 % (ref 0–1.5)
BILIRUB SERPL-MCNC: 0.5 MG/DL (ref 0–1.2)
BILIRUB UR QL STRIP: NEGATIVE
BUN SERPL-MCNC: 14.2 MG/DL (ref 6–20)
BUN/CREAT SERPL: 14.5 (ref 7–25)
CALCIUM SPEC-SCNC: 9.3 MG/DL (ref 8.6–10.5)
CHLORIDE SERPL-SCNC: 100 MMOL/L (ref 98–107)
CLARITY UR: CLEAR
CO2 SERPL-SCNC: 25.2 MMOL/L (ref 22–29)
COLOR UR: ABNORMAL
CREAT SERPL-MCNC: 0.98 MG/DL (ref 0.57–1)
DEPRECATED RDW RBC AUTO: 40.9 FL (ref 37–54)
EGFRCR SERPLBLD CKD-EPI 2021: 76.4 ML/MIN/1.73
EOSINOPHIL # BLD AUTO: 0.16 10*3/MM3 (ref 0–0.4)
EOSINOPHIL NFR BLD AUTO: 1.8 % (ref 0.3–6.2)
ERYTHROCYTE [DISTWIDTH] IN BLOOD BY AUTOMATED COUNT: 12.6 % (ref 12.3–15.4)
GLOBULIN UR ELPH-MCNC: 2.7 GM/DL
GLUCOSE SERPL-MCNC: 98 MG/DL (ref 65–99)
GLUCOSE UR STRIP-MCNC: NEGATIVE MG/DL
HCG SERPL QL: NEGATIVE
HCT VFR BLD AUTO: 47.2 % (ref 34–46.6)
HEMOCCULT STL QL: NEGATIVE
HGB BLD-MCNC: 15.3 G/DL (ref 12–15.9)
HGB UR QL STRIP.AUTO: ABNORMAL
HOLD SPECIMEN: NORMAL
IMM GRANULOCYTES # BLD AUTO: 0.01 10*3/MM3 (ref 0–0.05)
IMM GRANULOCYTES NFR BLD AUTO: 0.1 % (ref 0–0.5)
KETONES UR QL STRIP: NEGATIVE
LEUKOCYTE ESTERASE UR QL STRIP.AUTO: NEGATIVE
LIPASE SERPL-CCNC: 22 U/L (ref 13–60)
LYMPHOCYTES # BLD AUTO: 1.81 10*3/MM3 (ref 0.7–3.1)
LYMPHOCYTES NFR BLD AUTO: 20.3 % (ref 19.6–45.3)
MCH RBC QN AUTO: 28.5 PG (ref 26.6–33)
MCHC RBC AUTO-ENTMCNC: 32.4 G/DL (ref 31.5–35.7)
MCV RBC AUTO: 88.1 FL (ref 79–97)
MONOCYTES # BLD AUTO: 0.5 10*3/MM3 (ref 0.1–0.9)
MONOCYTES NFR BLD AUTO: 5.6 % (ref 5–12)
NEUTROPHILS NFR BLD AUTO: 6.38 10*3/MM3 (ref 1.7–7)
NEUTROPHILS NFR BLD AUTO: 71.8 % (ref 42.7–76)
NITRITE UR QL STRIP: NEGATIVE
PH UR STRIP.AUTO: 6.5 [PH] (ref 5–8)
PLATELET # BLD AUTO: 275 10*3/MM3 (ref 140–450)
PMV BLD AUTO: 10.2 FL (ref 6–12)
POTASSIUM SERPL-SCNC: 3.7 MMOL/L (ref 3.5–5.2)
PROT SERPL-MCNC: 7.1 G/DL (ref 6–8.5)
PROT UR QL STRIP: NEGATIVE
RBC # BLD AUTO: 5.36 10*6/MM3 (ref 3.77–5.28)
SODIUM SERPL-SCNC: 137 MMOL/L (ref 136–145)
SP GR UR STRIP: 1.01 (ref 1–1.03)
UROBILINOGEN UR QL STRIP: ABNORMAL
WBC NRBC COR # BLD AUTO: 8.9 10*3/MM3 (ref 3.4–10.8)
WHOLE BLOOD HOLD COAG: NORMAL
WHOLE BLOOD HOLD SPECIMEN: NORMAL

## 2025-06-23 PROCEDURE — 96374 THER/PROPH/DIAG INJ IV PUSH: CPT

## 2025-06-23 PROCEDURE — 99284 EMERGENCY DEPT VISIT MOD MDM: CPT

## 2025-06-23 PROCEDURE — 25810000003 SODIUM CHLORIDE 0.9 % SOLUTION: Performed by: EMERGENCY MEDICINE

## 2025-06-23 PROCEDURE — 96361 HYDRATE IV INFUSION ADD-ON: CPT

## 2025-06-23 PROCEDURE — 25010000002 ONDANSETRON PER 1 MG: Performed by: EMERGENCY MEDICINE

## 2025-06-23 PROCEDURE — 80053 COMPREHEN METABOLIC PANEL: CPT

## 2025-06-23 PROCEDURE — 82272 OCCULT BLD FECES 1-3 TESTS: CPT | Performed by: EMERGENCY MEDICINE

## 2025-06-23 PROCEDURE — 74176 CT ABD & PELVIS W/O CONTRAST: CPT

## 2025-06-23 PROCEDURE — 85025 COMPLETE CBC W/AUTO DIFF WBC: CPT

## 2025-06-23 PROCEDURE — 81001 URINALYSIS AUTO W/SCOPE: CPT | Performed by: EMERGENCY MEDICINE

## 2025-06-23 PROCEDURE — 36415 COLL VENOUS BLD VENIPUNCTURE: CPT

## 2025-06-23 PROCEDURE — 96375 TX/PRO/DX INJ NEW DRUG ADDON: CPT

## 2025-06-23 PROCEDURE — 84703 CHORIONIC GONADOTROPIN ASSAY: CPT

## 2025-06-23 PROCEDURE — 83690 ASSAY OF LIPASE: CPT

## 2025-06-23 RX ORDER — PANTOPRAZOLE SODIUM 40 MG/10ML
40 INJECTION, POWDER, LYOPHILIZED, FOR SOLUTION INTRAVENOUS ONCE
Status: COMPLETED | OUTPATIENT
Start: 2025-06-23 | End: 2025-06-23

## 2025-06-23 RX ORDER — ONDANSETRON 2 MG/ML
4 INJECTION INTRAMUSCULAR; INTRAVENOUS ONCE
Status: COMPLETED | OUTPATIENT
Start: 2025-06-23 | End: 2025-06-23

## 2025-06-23 RX ORDER — SODIUM CHLORIDE 0.9 % (FLUSH) 0.9 %
10 SYRINGE (ML) INJECTION AS NEEDED
Status: DISCONTINUED | OUTPATIENT
Start: 2025-06-23 | End: 2025-06-24 | Stop reason: HOSPADM

## 2025-06-23 RX ADMIN — PANTOPRAZOLE SODIUM 40 MG: 40 INJECTION, POWDER, FOR SOLUTION INTRAVENOUS at 21:39

## 2025-06-23 RX ADMIN — ONDANSETRON 4 MG: 2 INJECTION, SOLUTION INTRAMUSCULAR; INTRAVENOUS at 21:38

## 2025-06-23 RX ADMIN — SODIUM CHLORIDE 1000 ML: 9 INJECTION, SOLUTION INTRAVENOUS at 21:37

## 2025-06-24 VITALS
TEMPERATURE: 98.4 F | HEIGHT: 63 IN | OXYGEN SATURATION: 98 % | WEIGHT: 200 LBS | HEART RATE: 66 BPM | SYSTOLIC BLOOD PRESSURE: 147 MMHG | RESPIRATION RATE: 16 BRPM | DIASTOLIC BLOOD PRESSURE: 79 MMHG | BODY MASS INDEX: 35.44 KG/M2

## 2025-06-24 LAB
BACTERIA UR QL AUTO: NORMAL /HPF
HOLD SPECIMEN: NORMAL
HYALINE CASTS UR QL AUTO: NORMAL /LPF
RBC # UR STRIP: NORMAL /HPF
REF LAB TEST METHOD: NORMAL
SQUAMOUS #/AREA URNS HPF: NORMAL /HPF
WBC # UR STRIP: NORMAL /HPF

## 2025-06-24 RX ORDER — METRONIDAZOLE 500 MG/1
500 TABLET ORAL ONCE
Status: COMPLETED | OUTPATIENT
Start: 2025-06-24 | End: 2025-06-24

## 2025-06-24 RX ORDER — ONDANSETRON 4 MG/1
4 TABLET, ORALLY DISINTEGRATING ORAL EVERY 8 HOURS PRN
Qty: 6 TABLET | Refills: 0 | Status: SHIPPED | OUTPATIENT
Start: 2025-06-24

## 2025-06-24 RX ORDER — LEVOFLOXACIN 750 MG/1
750 TABLET, FILM COATED ORAL DAILY
Qty: 7 TABLET | Refills: 0 | Status: SHIPPED | OUTPATIENT
Start: 2025-06-24 | End: 2025-07-01

## 2025-06-24 RX ORDER — LEVOFLOXACIN 750 MG/1
750 TABLET, FILM COATED ORAL ONCE
Status: COMPLETED | OUTPATIENT
Start: 2025-06-24 | End: 2025-06-24

## 2025-06-24 RX ORDER — METRONIDAZOLE 500 MG/1
500 TABLET ORAL 4 TIMES DAILY
Qty: 28 TABLET | Refills: 0 | Status: SHIPPED | OUTPATIENT
Start: 2025-06-24 | End: 2025-07-01

## 2025-06-24 RX ORDER — NALTREXONE HYDROCHLORIDE 50 MG/1
50 TABLET, FILM COATED ORAL DAILY
COMMUNITY
Start: 2025-06-14

## 2025-06-24 RX ADMIN — METRONIDAZOLE 500 MG: 500 TABLET, FILM COATED ORAL at 01:39

## 2025-06-24 RX ADMIN — LEVOFLOXACIN 750 MG: 750 TABLET, FILM COATED ORAL at 01:39

## 2025-06-24 NOTE — FSED PROVIDER NOTE
Subjective   History of Present Illness  40yo female pmh significant mdd/migraine/alpha one antitrypsin deficiency/gastroparesis presents ED c/o 2 episodes passage bright red blood per rectum associated abdominal discomfort.   ROS neg fever/chest pain/soa/vomiting/dysuria/melena/hematochoezia/hematemesis.    History provided by:  Patient  Illness  Associated symptoms: abdominal pain, diarrhea and nausea    Associated symptoms: no vomiting        Review of Systems   Constitutional: Negative.    HENT: Negative.     Eyes: Negative.    Respiratory: Negative.     Cardiovascular: Negative.    Gastrointestinal:  Positive for abdominal pain, diarrhea and nausea. Negative for vomiting.   Genitourinary: Negative.    Allergic/Immunologic: Negative for immunocompromised state.   All other systems reviewed and are negative.      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    Hernia     Irritable bowel syndrome     Lactose intolerance     LFT elevation     Low back pain     Migraines     Scoliosis     Shingles     Urinary tract infection     Wears glasses        Allergies   Allergen Reactions    Chocolate Anaphylaxis and GI Intolerance    Contrast Dye (Echo Or Unknown Ct/Mr) Angioedema     TONGUE NUMBNESS    Sulfa Antibiotics Nausea And Vomiting    Beef-Derived Drug Products GI Intolerance    Gelatin GI Intolerance    Rice Allergy Skin Test GI Intolerance       Past Surgical History:   Procedure Laterality Date    CHOLECYSTECTOMY  10/23/2018    CHOLECYSTECTOMY WITH INTRAOPERATIVE CHOLANGIOGRAM N/A 10/24/2018    Procedure: LAPAROSCOPIC CHOLECYSTECTOMY WITH INTRAOPERATIVE CHOLANGIOGRAM, POSSIBLE OPEN;  Surgeon: Zuleima Johansen,  MD;  Location:  [facility];  Service: General    COLONOSCOPY N/A 02/04/2020    Procedure: COLONOSCOPY to cecum;  Surgeon: Marleni Alas MD;  Location:  [facility];  Service: Gastroenterology;  Laterality: N/A;  pre - abd pain  post - hemorrhoids    COLONOSCOPY  2/4/2020    ENDOSCOPY N/A 02/04/2020    Procedure: ESOPHAGOGASTRODUODENOSCOPY with cold bx;  Surgeon: Marleni Alas MD;  Location:  [facility];  Service: Gastroenterology;  Laterality: N/A;  pre - gerd, abd pain  post - gastritis    TONSILLECTOMY      UPPER GASTROINTESTINAL ENDOSCOPY N/A 09/21/2015    Normal esophagus, Normal stomach, Normal duodenum, Dr. Marleni Alas    VAGINAL DELIVERY N/A 10/08/2012    Dr. Brittney Wesley       Family History   Problem Relation Age of Onset    Depression Mother     Irritable bowel syndrome Mother     Alcohol abuse Mother     Asthma Mother     Anxiety disorder Mother     Arthritis Mother     Cancer Sister     Celiac disease Sister     Cancer Paternal Grandmother     Celiac disease Sister     Gallbladder disease Sister     Irritable bowel syndrome Sister     Irritable bowel syndrome Maternal Grandmother     Alcohol abuse Maternal Grandmother     Depression Maternal Grandmother     Mental illness Maternal Grandmother     Alcohol abuse Brother     Asthma Brother     Malig Hyperthermia Neg Hx        Social History     Socioeconomic History    Marital status:    Tobacco Use    Smoking status: Never     Passive exposure: Never    Smokeless tobacco: Never   Vaping Use    Vaping status: Never Used   Substance and Sexual Activity    Alcohol use: No    Drug use: No    Sexual activity: Yes     Partners: Male     Birth control/protection: Condom           Objective   Physical Exam  Vitals and nursing note reviewed. Exam conducted with a chaperone present.   Constitutional:       Appearance: Normal appearance.   HENT:      Head: Normocephalic and atraumatic.      Right Ear: External ear normal.      Left Ear:  External ear normal.      Nose: Nose normal.      Mouth/Throat:      Mouth: Mucous membranes are moist.      Pharynx: Oropharynx is clear.   Eyes:      Pupils: Pupils are equal, round, and reactive to light.   Cardiovascular:      Rate and Rhythm: Normal rate and regular rhythm.      Pulses: Normal pulses.      Heart sounds: Normal heart sounds. No murmur heard.     No friction rub. No gallop.   Pulmonary:      Effort: Pulmonary effort is normal. No respiratory distress.      Breath sounds: Normal breath sounds. No wheezing, rhonchi or rales.   Abdominal:      General: Abdomen is flat. Bowel sounds are normal.      Palpations: Abdomen is soft.      Tenderness: There is no abdominal tenderness. There is no guarding or rebound. Negative signs include Curtis's sign, Rovsing's sign and McBurney's sign.      Hernia: There is no hernia in the umbilical area or ventral area.   Genitourinary:     Rectum: Guaiac result negative.   Musculoskeletal:         General: Normal range of motion.      Cervical back: Normal range of motion and neck supple. No rigidity.      Right lower leg: No edema.      Left lower leg: No edema.   Lymphadenopathy:      Cervical: No cervical adenopathy.   Skin:     General: Skin is warm and dry.   Neurological:      General: No focal deficit present.      Mental Status: She is alert and oriented to person, place, and time.      GCS: GCS eye subscore is 4. GCS verbal subscore is 5. GCS motor subscore is 6.         Procedures           ED Course      Labs Reviewed   URINALYSIS W/ MICROSCOPIC IF INDICATED (NO CULTURE) - Abnormal; Notable for the following components:       Result Value    Blood, UA Trace (*)     All other components within normal limits   CBC WITH AUTO DIFFERENTIAL - Abnormal; Notable for the following components:    RBC 5.36 (*)     Hematocrit 47.2 (*)     All other components within normal limits   LIPASE - Normal   HCG, SERUM, QUALITATIVE - Normal   OCCULT BLOOD X 1, STOOL - Normal    RAINBOW DRAW    Narrative:     The following orders were created for panel order [place] Draw.  Procedure                               Abnormality         Status                     ---------                               -----------         ------                     Green Top (Gel)[[id number]]                                  Final result               Lavender Top[[id number]]                                     Final result               Gold Top - SST[[id number]]                                                              Light Blue Top[[id number]]                                   Final result               Green Top (Gel)[[id number]]                                                               Please view results for these tests on the individual orders.   COMPREHENSIVE METABOLIC PANEL    Narrative:     GFR Categories in Chronic Kidney Disease (CKD)              GFR Category          GFR (mL/min/1.73)    Interpretation  G1                    90 or greater        Normal or high (1)  G2                    60-89                Mild decrease (1)  G3a                   45-59                Mild to moderate decrease  G3b                   30-44                Moderate to severe decrease  G4                    15-29                Severe decrease  G5                    14 or less           Kidney failure    (1)In the absence of evidence of kidney disease, neither GFR category G1 or G2 fulfill the criteria for CKD.    eGFR calculation 2021 CKD-EPI creatinine equation, which does not include race as a factor   URINALYSIS, MICROSCOPIC ONLY   RAINBOW URINE CULTURE TUBE   CBC AND DIFFERENTIAL    Narrative:     The following orders were created for panel order CBC & Differential.  Procedure                               Abnormality         Status                     ---------                               -----------         ------                     CBC Auto Differential[[id number]]        Abnormal            Final result                  Please view results for these tests on the individual orders.   GREEN TOP   LAVENDER TOP   LIGHT BLUE TOP     CT Abdomen Pelvis Without Contrast  Result Date: 6/23/2025  Narrative: CT OF THE ABDOMEN PELVIS WITHOUT CONTRAST  HISTORY: Upper abdominal pain  COMPARISON: None available.  TECHNIQUE: Axial CT imaging was obtained through the abdomen and pelvis. No IV contrast was administered.  FINDINGS: Images through the lung bases are clear. No suspicious hepatic lesions are seen. The stomach, duodenum, adrenal glands, and spleen all appear normal. The pancreas is mildly atrophic. Gallbladder is absent. No hydronephrosis is seen on either side. There is a 4 mm nonobstructing stone within the left kidney. Uterus appears unremarkable. Urinary bladder is decompressed. There is no bowel obstruction. The appendix is normal. The patient's descending colon is thick-walled and edematous, with adjacent pericolonic soft tissue stranding. The appearance is in keeping with colitis. No pneumatosis or free air is seen. No acute osseous abnormalities are identified. There is mild lumbar scoliosis, with convexity to the right.      Impression: The patient's descending colon is thick-walled and edematous, with adjacent pericolonic soft tissue stranding, in keeping with colitis.  Radiation dose reduction techniques were utilized, including automated exposure control and exposure modulation based on body size.   This report was finalized on 6/23/2025 10:52 PM by Dr. Macey Awda M.D on Workstation: [facility]                                           Medical Decision Making  Problems Addressed:  Colitis: complicated acute illness or injury    Amount and/or Complexity of Data Reviewed  Labs: ordered.  Radiology: ordered.    Risk  Prescription drug management.        Final diagnoses:   Colitis       ED Disposition  ED Disposition       ED Disposition   Discharge    Condition   Good    Comment   --                Jacobo Valero MD  [address]  [address]  [facility] [id number] [phone]    In 1 week      Cora Palmer PA-C  [address] [facility]  [address]  [facility] [id number] [phone]    In 1 day           Medication List        New Prescriptions      levoFLOXacin 750 MG tablet  Commonly known as: LEVAQUIN  Take 1 tablet by mouth Daily for 7 days.     metroNIDAZOLE 500 MG tablet  Commonly known as: FLAGYL  Take 1 tablet by mouth 4 (Four) Times a Day for 7 days.            Changed      * ondansetron ODT 4 MG disintegrating tablet  Commonly known as: ZOFRAN-ODT  Place 1 tablet on the tongue Every 8 (Eight) Hours As Needed for Nausea or Vomiting.  What changed: Another medication with the same name was added. Make sure you understand how and when to take each.     * ondansetron ODT 4 MG disintegrating tablet  Commonly known as: ZOFRAN-ODT  Place 1 tablet on the tongue Every 8 (Eight) Hours As Needed for Nausea or Vomiting.  What changed: You were already taking a medication with the same name, and this prescription was added. Make sure you understand how and when to take each.           * This list has 2 medication(s) that are the same as other medications prescribed for you. Read the directions carefully, and ask your doctor or other care provider to review them with you.                   Where to Get Your Medications        These medications were sent to [address] [facility] - [phone]  - [phone]   [address] [facility] [address]      Phone: [phone]   levoFLOXacin 750 MG tablet  metroNIDAZOLE 500 MG tablet  ondansetron ODT 4 MG disintegrating tablet

## 2025-06-25 ENCOUNTER — TELEPHONE (OUTPATIENT)
Dept: GASTROENTEROLOGY | Facility: CLINIC | Age: 40
End: 2025-06-25
Payer: COMMERCIAL

## 2025-06-25 ENCOUNTER — TELEPHONE (OUTPATIENT)
Dept: GASTROENTEROLOGY | Facility: CLINIC | Age: 40
End: 2025-06-25

## 2025-06-25 NOTE — TELEPHONE ENCOUNTER
Olivia Mendosa, Mukesh Gillis, PCT  Can you please see if she can come in 7/7 at 11:30?    Per provide above     Lvm for pt to see if she can come in at that time     Ok for the hub to relay

## 2025-06-25 NOTE — TELEPHONE ENCOUNTER
Hub staff attempted to follow warm transfer process and was unsuccessful     Caller: Adry Knight    Relationship to patient: Self    Best call back number: [phone]     Patient is needing: PATIENT IS RETURNING CALL FROM RUTH ANN IN REGARDS TO AN APPOINTMENT.  SHE STATES SHE IS ABLE TO COME IN ON 7/7 AT 11:30 AM.  PLEASE CALL BACK TO VERIFY.

## 2025-06-25 NOTE — TELEPHONE ENCOUNTER
Hub staff attempted to follow warm transfer process and was unsuccessful     Caller: Adry Knight    Relationship to patient: Self    Best call back number: [phone]     Patient is needing: PATIENT IS RETURNING CALL FROM RUTH ANN IN REGARDS TO AN APPOINTMENT.  SHE STATES SHE IS ABLE TO COME IN ON 7/7 AT 11:30 AM.  PLEASE CALL BACK TO VERIFY.

## 2025-06-25 NOTE — TELEPHONE ENCOUNTER
PROVIDER IS OK WITH PUTTING PT ON THIS DATE PT IS AWARE OF DATE JUST NEEDS TO BE PUT IN BY PROVIDER   WE DONT HAVE ACCESS TO OVER BOOKS     Please schedule 07/11  at 1130 pt can come in         Corrina Pickard RegSched Rep  DM    6/25/25 10:52 AM  Note      Hub staff attempted to follow warm transfer process and was unsuccessful      Caller: Adry Knight     Relationship to patient: Self     Best call back number: [phone]      Patient is needing: PATIENT IS RETURNING CALL FROM RUTH ANN IN REGARDS TO AN APPOINTMENT.  SHE STATES SHE IS ABLE TO COME IN ON 7/7 AT 11:30 AM.  PLEASE CALL BACK TO VERIFY.

## 2025-06-27 ENCOUNTER — TELEPHONE (OUTPATIENT)
Dept: GASTROENTEROLOGY | Facility: CLINIC | Age: 40
End: 2025-06-27
Payer: COMMERCIAL

## 2025-06-27 NOTE — TELEPHONE ENCOUNTER
I would recommend starting with Miralax twice daily first while she finished out her antibiotics and if needed we can try to add Linzess back in at that time.

## 2025-06-27 NOTE — TELEPHONE ENCOUNTER
Called pt and pt reports that she was in the ER on 06/23 with colitis.  Pt reports that has not had a bm since Sunday 06/22 and that one was mostly blood.  She reports that she takes linzess 290mcg.  She reports that she is getting uncomfortable and is asking if she can resume the linzess.   Also asking if she can take an enema.  Advised pt will send message to Olivia EDWARDS

## 2025-06-27 NOTE — TELEPHONE ENCOUNTER
Called pt and advised of Olivia PIERRE's note. Verb understanding.     Pt reports that she does not tolerate miralax very well . She states that due to her gastroparesis , the miralax causes her to bloat.  She is asking if there is something else she can try.  Advised will send message to Olivia PIERRE.

## 2025-06-27 NOTE — TELEPHONE ENCOUNTER
Caller: Adry Knight    Relationship: Self    Best call back number: [phone]    What is the best time to reach you: ANYTIME    Who are you requesting to speak with (clinical staff, provider,  specific staff member): NURSE    Do you know the name of the person who called: PATIENT    What was the call regarding: PT HAS AN APPT ON 07/11/25 BUT WAS IN THE ER SATURDAY.  DX WAS PROLITIS.  HER PCP SAID SHE NEEDED TO SPEAK TO HER GI DR.  PT IS REQUESTING THE NURSE TO CALL HER TODAY.

## 2025-07-02 ENCOUNTER — APPOINTMENT (OUTPATIENT)
Dept: CT IMAGING | Facility: HOSPITAL | Age: 40
End: 2025-07-02
Payer: COMMERCIAL

## 2025-07-02 ENCOUNTER — HOSPITAL ENCOUNTER (EMERGENCY)
Facility: HOSPITAL | Age: 40
Discharge: HOME | End: 2025-07-02
Attending: STUDENT IN AN ORGANIZED HEALTH CARE EDUCATION/TRAINING PROGRAM | Admitting: STUDENT IN AN ORGANIZED HEALTH CARE EDUCATION/TRAINING PROGRAM
Payer: COMMERCIAL

## 2025-07-02 VITALS
SYSTOLIC BLOOD PRESSURE: 124 MMHG | WEIGHT: 200 LBS | HEART RATE: 78 BPM | DIASTOLIC BLOOD PRESSURE: 86 MMHG | HEIGHT: 63 IN | OXYGEN SATURATION: 100 % | TEMPERATURE: 97.5 F | RESPIRATION RATE: 15 BRPM | BODY MASS INDEX: 35.44 KG/M2

## 2025-07-02 DIAGNOSIS — R20.2 NUMBNESS AND TINGLING OF BOTH UPPER EXTREMITIES: Primary | ICD-10-CM

## 2025-07-02 DIAGNOSIS — R20.0 NUMBNESS AND TINGLING OF BOTH UPPER EXTREMITIES: Primary | ICD-10-CM

## 2025-07-02 LAB
ALBUMIN SERPL-MCNC: 4.3 G/DL (ref 3.5–5.2)
ALBUMIN/GLOB SERPL: 1.7 G/DL
ALP SERPL-CCNC: 86 U/L (ref 39–117)
ALT SERPL W P-5'-P-CCNC: 46 U/L (ref 1–33)
ANION GAP SERPL CALCULATED.3IONS-SCNC: 9 MMOL/L (ref 5–15)
AST SERPL-CCNC: 34 U/L (ref 1–32)
BASOPHILS # BLD AUTO: 0.04 10*3/MM3 (ref 0–0.2)
BASOPHILS NFR BLD AUTO: 0.5 % (ref 0–1.5)
BILIRUB SERPL-MCNC: 0.3 MG/DL (ref 0–1.2)
BILIRUB UR QL STRIP: NEGATIVE
BUN SERPL-MCNC: 12.5 MG/DL (ref 6–20)
BUN/CREAT SERPL: 12.4 (ref 7–25)
CALCIUM SPEC-SCNC: 9.4 MG/DL (ref 8.6–10.5)
CHLORIDE SERPL-SCNC: 103 MMOL/L (ref 98–107)
CK SERPL-CCNC: 61 U/L (ref 20–180)
CLARITY UR: CLEAR
CO2 SERPL-SCNC: 27 MMOL/L (ref 22–29)
COLOR UR: YELLOW
CREAT SERPL-MCNC: 1.01 MG/DL (ref 0.57–1)
DEPRECATED RDW RBC AUTO: 43 FL (ref 37–54)
EGFRCR SERPLBLD CKD-EPI 2021: 73.7 ML/MIN/1.73
EOSINOPHIL # BLD AUTO: 0.04 10*3/MM3 (ref 0–0.4)
EOSINOPHIL NFR BLD AUTO: 0.5 % (ref 0.3–6.2)
ERYTHROCYTE [DISTWIDTH] IN BLOOD BY AUTOMATED COUNT: 12.9 % (ref 12.3–15.4)
GEN 5 1HR TROPONIN T REFLEX: <6 NG/L
GLOBULIN UR ELPH-MCNC: 2.5 GM/DL
GLUCOSE SERPL-MCNC: 99 MG/DL (ref 65–99)
GLUCOSE UR STRIP-MCNC: NEGATIVE MG/DL
HCG SERPL QL: NEGATIVE
HCT VFR BLD AUTO: 48.2 % (ref 34–46.6)
HGB BLD-MCNC: 15.3 G/DL (ref 12–15.9)
HGB UR QL STRIP.AUTO: NEGATIVE
HOLD SPECIMEN: NORMAL
IMM GRANULOCYTES # BLD AUTO: 0.02 10*3/MM3 (ref 0–0.05)
IMM GRANULOCYTES NFR BLD AUTO: 0.3 % (ref 0–0.5)
KETONES UR QL STRIP: NEGATIVE
LEUKOCYTE ESTERASE UR QL STRIP.AUTO: NEGATIVE
LIPASE SERPL-CCNC: 35 U/L (ref 13–60)
LYMPHOCYTES # BLD AUTO: 1.87 10*3/MM3 (ref 0.7–3.1)
LYMPHOCYTES NFR BLD AUTO: 25.5 % (ref 19.6–45.3)
MCH RBC QN AUTO: 28.6 PG (ref 26.6–33)
MCHC RBC AUTO-ENTMCNC: 31.7 G/DL (ref 31.5–35.7)
MCV RBC AUTO: 90.1 FL (ref 79–97)
MONOCYTES # BLD AUTO: 0.41 10*3/MM3 (ref 0.1–0.9)
MONOCYTES NFR BLD AUTO: 5.6 % (ref 5–12)
NEUTROPHILS NFR BLD AUTO: 4.94 10*3/MM3 (ref 1.7–7)
NEUTROPHILS NFR BLD AUTO: 67.6 % (ref 42.7–76)
NITRITE UR QL STRIP: NEGATIVE
PH UR STRIP.AUTO: 6 [PH] (ref 5–8)
PLATELET # BLD AUTO: 244 10*3/MM3 (ref 140–450)
PMV BLD AUTO: 10.5 FL (ref 6–12)
POTASSIUM SERPL-SCNC: 3.3 MMOL/L (ref 3.5–5.2)
PROT SERPL-MCNC: 6.8 G/DL (ref 6–8.5)
PROT UR QL STRIP: NEGATIVE
RBC # BLD AUTO: 5.35 10*6/MM3 (ref 3.77–5.28)
SODIUM SERPL-SCNC: 139 MMOL/L (ref 136–145)
SP GR UR STRIP: <=1.005 (ref 1–1.03)
TROPONIN T NUMERIC DELTA: NORMAL
TROPONIN T SERPL HS-MCNC: <6 NG/L
UROBILINOGEN UR QL STRIP: NORMAL
WBC NRBC COR # BLD AUTO: 7.32 10*3/MM3 (ref 3.4–10.8)
WHOLE BLOOD HOLD COAG: NORMAL
WHOLE BLOOD HOLD SPECIMEN: NORMAL

## 2025-07-02 PROCEDURE — 85025 COMPLETE CBC W/AUTO DIFF WBC: CPT | Performed by: STUDENT IN AN ORGANIZED HEALTH CARE EDUCATION/TRAINING PROGRAM

## 2025-07-02 PROCEDURE — 93005 ELECTROCARDIOGRAM TRACING: CPT | Performed by: STUDENT IN AN ORGANIZED HEALTH CARE EDUCATION/TRAINING PROGRAM

## 2025-07-02 PROCEDURE — 36415 COLL VENOUS BLD VENIPUNCTURE: CPT

## 2025-07-02 PROCEDURE — 81003 URINALYSIS AUTO W/O SCOPE: CPT | Performed by: STUDENT IN AN ORGANIZED HEALTH CARE EDUCATION/TRAINING PROGRAM

## 2025-07-02 PROCEDURE — 84484 ASSAY OF TROPONIN QUANT: CPT | Performed by: STUDENT IN AN ORGANIZED HEALTH CARE EDUCATION/TRAINING PROGRAM

## 2025-07-02 PROCEDURE — 84703 CHORIONIC GONADOTROPIN ASSAY: CPT | Performed by: STUDENT IN AN ORGANIZED HEALTH CARE EDUCATION/TRAINING PROGRAM

## 2025-07-02 PROCEDURE — 83690 ASSAY OF LIPASE: CPT | Performed by: STUDENT IN AN ORGANIZED HEALTH CARE EDUCATION/TRAINING PROGRAM

## 2025-07-02 PROCEDURE — 82550 ASSAY OF CK (CPK): CPT | Performed by: STUDENT IN AN ORGANIZED HEALTH CARE EDUCATION/TRAINING PROGRAM

## 2025-07-02 PROCEDURE — 74176 CT ABD & PELVIS W/O CONTRAST: CPT

## 2025-07-02 PROCEDURE — 99284 EMERGENCY DEPT VISIT MOD MDM: CPT

## 2025-07-02 PROCEDURE — 80053 COMPREHEN METABOLIC PANEL: CPT | Performed by: STUDENT IN AN ORGANIZED HEALTH CARE EDUCATION/TRAINING PROGRAM

## 2025-07-02 PROCEDURE — 25810000003 SODIUM CHLORIDE 0.9 % SOLUTION: Performed by: STUDENT IN AN ORGANIZED HEALTH CARE EDUCATION/TRAINING PROGRAM

## 2025-07-02 PROCEDURE — 99284 EMERGENCY DEPT VISIT MOD MDM: CPT | Performed by: NURSE PRACTITIONER

## 2025-07-02 RX ORDER — SODIUM CHLORIDE 0.9 % (FLUSH) 0.9 %
10 SYRINGE (ML) INJECTION AS NEEDED
Status: DISCONTINUED | OUTPATIENT
Start: 2025-07-02 | End: 2025-07-02 | Stop reason: HOSPADM

## 2025-07-02 RX ADMIN — SODIUM CHLORIDE 1000 ML: 9 INJECTION, SOLUTION INTRAVENOUS at 16:56

## 2025-07-02 NOTE — [facility] PROVIDER NOTE
Subjective   History of Present Illness  39 yr old presented C/O general weakness.  States it stated this morning and she woke up with both arms feeling numb,  She feels weak.  Her lower extremities feel weak and she feels like she is hot inside and both her thighs feel cold on outside and her skin is a little mottled.  Denies ABD pain.  No N/V.  She has some constipation and states she has not had a BM for over a week.  But she also is not eating much.  No fever or chills.  No blurred vision. She says something just feels off.        Review of Systems   Respiratory: Negative.     Cardiovascular: Negative.    Gastrointestinal:  Positive for constipation. Negative for abdominal pain, nausea and vomiting.   Genitourinary: Negative.    Musculoskeletal: Negative.    Neurological:  Positive for weakness and numbness.       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    Hernia     Irritable bowel syndrome     Lactose intolerance     LFT elevation     Low back pain     Migraines     Scoliosis     Shingles     Urinary tract infection     Wears glasses        Allergies   Allergen Reactions    Chocolate Anaphylaxis and GI Intolerance    Contrast Dye (Echo Or Unknown Ct/Mr) Angioedema     TONGUE NUMBNESS    Sulfa Antibiotics Nausea And Vomiting    Beef-Derived Drug Products GI Intolerance    Gelatin GI Intolerance    Rice Allergy Skin Test GI Intolerance       Past Surgical History:   Procedure Laterality Date    CHOLECYSTECTOMY  10/23/2018    CHOLECYSTECTOMY WITH INTRAOPERATIVE CHOLANGIOGRAM N/A 10/24/2018    Procedure: LAPAROSCOPIC CHOLECYSTECTOMY WITH INTRAOPERATIVE CHOLANGIOGRAM, POSSIBLE OPEN;  Surgeon: Zuleima Johansen,  MD;  Location:  [facility];  Service: General    COLONOSCOPY N/A 02/04/2020    Procedure: COLONOSCOPY to cecum;  Surgeon: Marleni Alas MD;  Location:  [facility];  Service: Gastroenterology;  Laterality: N/A;  pre - abd pain  post - hemorrhoids    COLONOSCOPY  2/4/2020    ENDOSCOPY N/A 02/04/2020    Procedure: ESOPHAGOGASTRODUODENOSCOPY with cold bx;  Surgeon: Marleni Alas MD;  Location:  [facility];  Service: Gastroenterology;  Laterality: N/A;  pre - gerd, abd pain  post - gastritis    TONSILLECTOMY      UPPER GASTROINTESTINAL ENDOSCOPY N/A 09/21/2015    Normal esophagus, Normal stomach, Normal duodenum, Dr. Marleni Alas    VAGINAL DELIVERY N/A 10/08/2012    Dr. Brittney Wesley       Family History   Problem Relation Age of Onset    Depression Mother     Irritable bowel syndrome Mother     Alcohol abuse Mother     Asthma Mother     Anxiety disorder Mother     Arthritis Mother     Cancer Sister     Celiac disease Sister     Cancer Paternal Grandmother     Celiac disease Sister     Gallbladder disease Sister     Irritable bowel syndrome Sister     Irritable bowel syndrome Maternal Grandmother     Alcohol abuse Maternal Grandmother     Depression Maternal Grandmother     Mental illness Maternal Grandmother     Alcohol abuse Brother     Asthma Brother     Malig Hyperthermia Neg Hx        Social History     Socioeconomic History    Marital status:    Tobacco Use    Smoking status: Never     Passive exposure: Never    Smokeless tobacco: Never   Vaping Use    Vaping status: Never Used   Substance and Sexual Activity    Alcohol use: No    Drug use: No    Sexual activity: Yes     Partners: Male     Birth control/protection: Condom           Objective   Physical Exam  Vitals and nursing note reviewed.   Constitutional:       Appearance: Normal appearance.   HENT:      Nose: Nose normal.      Mouth/Throat:      Lips: Pink.      Mouth: Mucous membranes are moist.      Pharynx: Oropharynx is clear.  Uvula midline.   Eyes:      Extraocular Movements: Extraocular movements intact.      Conjunctiva/sclera: Conjunctivae normal.      Pupils: Pupils are equal, round, and reactive to light.   Cardiovascular:      Rate and Rhythm: Normal rate and regular rhythm.      Pulses: Normal pulses.      Heart sounds: Normal heart sounds, S1 normal and S2 normal.   Pulmonary:      Effort: Pulmonary effort is normal.      Breath sounds: Normal breath sounds and air entry.   Abdominal:      General: Abdomen is protuberant. Bowel sounds are decreased.      Palpations: Abdomen is soft.      Tenderness: There is no abdominal tenderness. There is no right CVA tenderness, left CVA tenderness, guarding or rebound.   Musculoskeletal:      Cervical back: Normal range of motion.      Right lower leg: No edema.      Left lower leg: No edema.   Neurological:      Mental Status: She is alert and oriented to person, place, and time.      GCS: GCS eye subscore is 4. GCS verbal subscore is 5. GCS motor subscore is 6.      Cranial Nerves: Cranial nerves 2-12 are intact.      Motor: Motor function is intact.      Coordination: Coordination is intact.      Gait: Gait is intact.      Comments:  strong and equal. Pedal pushes and pulls equal and strong.  Good strength in upper extremity.         Procedures           ED Course  ED Course as of 07/02/25 2152 Wed Jul 02, 2025 1650 ECG 12 Lead ED Triage Standing Order; Abdominal Pain (Upper)  I reviewed the EKG myself and patient has sinus rhythm.  Heart rate 83.  IA interval 149.  QTc 452.  There is no abnormal ST elevation, depressions or T wave inversion. [DL]   1715 CBC & Differential(!) [JJ]   1715 RBC(!): 5.35 [JJ]   1715 Hematocrit(!): 48.2 [JJ]   1715 Creatinine(!): 1.01 [JJ]   1715 Potassium(!): 3.3 [JJ]   1715 ALT (SGPT)(!): 46 [JJ]   1715 AST (SGOT)(!): 34  Lipase 35 [JJ]   1845 High Sensitivity Troponin T 1Hr [JJ]   1900 CT Abdomen Pelvis Without Contrast  Reviewed CT scan results  with patient.  States she feels like she is constipated.  Mag citrate given to pt to take at home. [JJ]   1935 Feels better after IVF.  Ambulated in hallway without problems.  Will have her F/U with PCP.  Call neurology for appt for further evaluation of arm numbness.  Pt agrees. [JJ]      ED Course User Index  [DL] Speedy Gilbert MD  [JJ] Maryjo Morse, CHULA                                           Medical Decision Making  Problems Addressed:  Numbness and tingling of both upper extremities: complicated acute illness or injury    Amount and/or Complexity of Data Reviewed  Labs: ordered. Decision-making details documented in ED Course.  Radiology: ordered. Decision-making details documented in ED Course.  ECG/medicine tests: ordered. Decision-making details documented in ED Course.    Risk  Prescription drug management.        Final diagnoses:   Numbness and tingling of both upper extremities       ED Disposition  ED Disposition       ED Disposition   Discharge    Condition   Stable    Comment   --               Marina Burger MD  [address] [address]  [phone]    Schedule an appointment as soon as possible for a visit in 1 day           Medication List        Stop      levoFLOXacin 750 MG tablet  Commonly known as: LEVAQUIN     metroNIDAZOLE 500 MG tablet  Commonly known as: FLAGYL

## 2025-07-03 LAB
QT INTERVAL: 384 MS
QTC INTERVAL: 452 MS

## 2025-07-05 DIAGNOSIS — K59.04 CHRONIC IDIOPATHIC CONSTIPATION: ICD-10-CM

## 2025-07-07 RX ORDER — LINACLOTIDE 145 UG/1
145 CAPSULE, GELATIN COATED ORAL
Qty: 90 CAPSULE | Refills: 0 | Status: SHIPPED | OUTPATIENT
Start: 2025-07-07

## 2025-07-09 NOTE — TELEPHONE ENCOUNTER
Hub staff attempted to follow warm transfer process and was unsuccessful     Caller: Adry Knight    Relationship to patient: Self    Best call back number: [phone]    Patient is needing: PT IS CALLING BACK BECAUSE SHE RECEIVED A MESSAGE STATING SHE HAD AN APPT ON FRIDAY 07/11/25. GOING THROUGH THE MESSAGES THE APPT WAS WORKED IN AND WAS SUPPOSED TO BE SCHEDULED FOR 07/07/25 AT 1130 INSTEAD OF 07/11/25 AT 1130. PT CANNOT COME IN ON FRIDAY'S AS SHE WORKS. PLEASE CALL PT BACK AND R/S ASAP. PT IS AVAIL ON MONDAYS AND TUESDAYS.

## 2025-07-09 NOTE — TELEPHONE ENCOUNTER
Olivia, pt has a work in appt for 7/11 at 11:30.  She thought the appt was supposed to be for 7/7.  States that she cannot make the 7/11 appt.(See note from pt).  Do you have another work in appt that we can give the pt.  It needs to be a Monday or Tuesday as she works on Fridays

## 2025-07-10 NOTE — TELEPHONE ENCOUNTER
Laura, I called the pt and she can be here.  will you please add the pt to Olivia's schedule for 7/15 at 3 pm

## (undated) DEVICE — ADAPT CLN BIOGUARD AIR/H2O DISP

## (undated) DEVICE — DRP C/ARM 41X74IN

## (undated) DEVICE — LN SMPL CO2 SHTRM SD STREAM W/M LUER

## (undated) DEVICE — KT ORCA ORCAPOD DISP STRL

## (undated) DEVICE — ENDOPATH XCEL BLADELESS TROCARS WITH STABILITY SLEEVES: Brand: ENDOPATH XCEL

## (undated) DEVICE — ENDOPOUCH RETRIEVER SPECIMEN RETRIEVAL BAGS: Brand: ENDOPOUCH RETRIEVER

## (undated) DEVICE — CANN O2 ETCO2 FITS ALL CONN CO2 SMPL A/ 7IN DISP LF

## (undated) DEVICE — ENDOPATH XCEL BLUNT TIP TROCARS WITH SMOOTH SLEEVES: Brand: ENDOPATH XCEL

## (undated) DEVICE — ANTIBACTERIAL UNDYED BRAIDED (POLYGLACTIN 910), SYNTHETIC ABSORBABLE SUTURE: Brand: COATED VICRYL

## (undated) DEVICE — TUBING, SUCTION, 1/4" X 10', STRAIGHT: Brand: MEDLINE

## (undated) DEVICE — SUT VIC 0/0 UR6 27IN DYED J603H

## (undated) DEVICE — THE TORRENT IRRIGATION SCOPE CONNECTOR IS USED WITH THE TORRENT IRRIGATION TUBING TO PROVIDE IRRIGATION FLUIDS SUCH AS STERILE WATER DURING GASTROINTESTINAL ENDOSCOPIC PROCEDURES WHEN USED IN CONJUNCTION WITH AN IRRIGATION PUMP (OR ELECTROSURGICAL UNIT).: Brand: TORRENT

## (undated) DEVICE — ADHS SKIN DERMABOND TOP ADVANCED

## (undated) DEVICE — GLV SURG SENSICARE POLYISPRN W/ALOE PF LF 6.5 GRN STRL

## (undated) DEVICE — SOL NACL 0.9PCT 1000ML

## (undated) DEVICE — SINGLE-USE BIOPSY FORCEPS: Brand: RADIAL JAW 4

## (undated) DEVICE — BITEBLOCK OMNI BLOC

## (undated) DEVICE — ENDOPATH XCEL UNIVERSAL TROCAR STABLILITY SLEEVES: Brand: ENDOPATH XCEL

## (undated) DEVICE — SENSR O2 OXIMAX FNGR A/ 18IN NONSTR

## (undated) DEVICE — APPL CHLORAPREP W/TINT 26ML ORNG

## (undated) DEVICE — GLV SURG BIOGEL LTX PF 6

## (undated) DEVICE — PK LAP CHOLE BG

## (undated) DEVICE — VISUALIZATION SYSTEM: Brand: CLEARIFY